# Patient Record
Sex: FEMALE | Race: BLACK OR AFRICAN AMERICAN | NOT HISPANIC OR LATINO | URBAN - METROPOLITAN AREA
[De-identification: names, ages, dates, MRNs, and addresses within clinical notes are randomized per-mention and may not be internally consistent; named-entity substitution may affect disease eponyms.]

---

## 2018-05-02 ENCOUNTER — OUTPATIENT (OUTPATIENT)
Dept: OUTPATIENT SERVICES | Facility: HOSPITAL | Age: 63
LOS: 1 days | End: 2018-05-02
Payer: COMMERCIAL

## 2018-05-02 ENCOUNTER — APPOINTMENT (OUTPATIENT)
Dept: CV DIAGNOSTICS | Facility: HOSPITAL | Age: 63
End: 2018-05-02

## 2018-05-02 DIAGNOSIS — I25.10 ATHEROSCLEROTIC HEART DISEASE OF NATIVE CORONARY ARTERY WITHOUT ANGINA PECTORIS: ICD-10-CM

## 2018-05-02 DIAGNOSIS — Z98.82 BREAST IMPLANT STATUS: Chronic | ICD-10-CM

## 2018-05-02 DIAGNOSIS — D25.9 LEIOMYOMA OF UTERUS, UNSPECIFIED: Chronic | ICD-10-CM

## 2018-05-02 PROCEDURE — 78452 HT MUSCLE IMAGE SPECT MULT: CPT | Mod: 26

## 2018-05-02 PROCEDURE — 93016 CV STRESS TEST SUPVJ ONLY: CPT

## 2018-05-02 PROCEDURE — 78452 HT MUSCLE IMAGE SPECT MULT: CPT

## 2018-05-02 PROCEDURE — A9500: CPT

## 2018-05-02 PROCEDURE — 93017 CV STRESS TEST TRACING ONLY: CPT

## 2018-05-02 PROCEDURE — 93018 CV STRESS TEST I&R ONLY: CPT

## 2018-07-06 ENCOUNTER — OUTPATIENT (OUTPATIENT)
Dept: OUTPATIENT SERVICES | Facility: HOSPITAL | Age: 63
LOS: 1 days | End: 2018-07-06
Payer: COMMERCIAL

## 2018-07-06 ENCOUNTER — TRANSCRIPTION ENCOUNTER (OUTPATIENT)
Age: 63
End: 2018-07-06

## 2018-07-06 VITALS
RESPIRATION RATE: 16 BRPM | SYSTOLIC BLOOD PRESSURE: 132 MMHG | TEMPERATURE: 99 F | WEIGHT: 225.09 LBS | HEIGHT: 65 IN | HEART RATE: 98 BPM | OXYGEN SATURATION: 98 % | DIASTOLIC BLOOD PRESSURE: 63 MMHG

## 2018-07-06 DIAGNOSIS — D25.9 LEIOMYOMA OF UTERUS, UNSPECIFIED: Chronic | ICD-10-CM

## 2018-07-06 DIAGNOSIS — Z98.82 BREAST IMPLANT STATUS: Chronic | ICD-10-CM

## 2018-07-06 DIAGNOSIS — R94.39 ABNORMAL RESULT OF OTHER CARDIOVASCULAR FUNCTION STUDY: ICD-10-CM

## 2018-07-06 LAB
ANION GAP SERPL CALC-SCNC: 15 MMOL/L — SIGNIFICANT CHANGE UP (ref 5–17)
BUN SERPL-MCNC: 16 MG/DL — SIGNIFICANT CHANGE UP (ref 7–23)
CALCIUM SERPL-MCNC: 9.7 MG/DL — SIGNIFICANT CHANGE UP (ref 8.4–10.5)
CHLORIDE SERPL-SCNC: 100 MMOL/L — SIGNIFICANT CHANGE UP (ref 96–108)
CO2 SERPL-SCNC: 27 MMOL/L — SIGNIFICANT CHANGE UP (ref 22–31)
CREAT SERPL-MCNC: 1.04 MG/DL — SIGNIFICANT CHANGE UP (ref 0.5–1.3)
GLUCOSE BLDC GLUCOMTR-MCNC: 234 MG/DL — HIGH (ref 70–99)
GLUCOSE SERPL-MCNC: 285 MG/DL — HIGH (ref 70–99)
HCT VFR BLD CALC: 40.3 % — SIGNIFICANT CHANGE UP (ref 34.5–45)
HGB BLD-MCNC: 14 G/DL — SIGNIFICANT CHANGE UP (ref 11.5–15.5)
MCHC RBC-ENTMCNC: 32.1 PG — SIGNIFICANT CHANGE UP (ref 27–34)
MCHC RBC-ENTMCNC: 34.7 GM/DL — SIGNIFICANT CHANGE UP (ref 32–36)
MCV RBC AUTO: 92.6 FL — SIGNIFICANT CHANGE UP (ref 80–100)
PLATELET # BLD AUTO: 244 K/UL — SIGNIFICANT CHANGE UP (ref 150–400)
POTASSIUM SERPL-MCNC: 3.8 MMOL/L — SIGNIFICANT CHANGE UP (ref 3.5–5.3)
POTASSIUM SERPL-SCNC: 3.8 MMOL/L — SIGNIFICANT CHANGE UP (ref 3.5–5.3)
RBC # BLD: 4.35 M/UL — SIGNIFICANT CHANGE UP (ref 3.8–5.2)
RBC # FLD: 12.3 % — SIGNIFICANT CHANGE UP (ref 10.3–14.5)
SODIUM SERPL-SCNC: 142 MMOL/L — SIGNIFICANT CHANGE UP (ref 135–145)
WBC # BLD: 5.2 K/UL — SIGNIFICANT CHANGE UP (ref 3.8–10.5)
WBC # FLD AUTO: 5.2 K/UL — SIGNIFICANT CHANGE UP (ref 3.8–10.5)

## 2018-07-06 PROCEDURE — 82962 GLUCOSE BLOOD TEST: CPT

## 2018-07-06 PROCEDURE — 93010 ELECTROCARDIOGRAM REPORT: CPT

## 2018-07-06 PROCEDURE — 99152 MOD SED SAME PHYS/QHP 5/>YRS: CPT

## 2018-07-06 PROCEDURE — 93458 L HRT ARTERY/VENTRICLE ANGIO: CPT | Mod: 26

## 2018-07-06 PROCEDURE — 93458 L HRT ARTERY/VENTRICLE ANGIO: CPT

## 2018-07-06 PROCEDURE — C1894: CPT

## 2018-07-06 PROCEDURE — C1887: CPT

## 2018-07-06 PROCEDURE — 80048 BASIC METABOLIC PNL TOTAL CA: CPT

## 2018-07-06 PROCEDURE — 93005 ELECTROCARDIOGRAM TRACING: CPT

## 2018-07-06 PROCEDURE — C1769: CPT

## 2018-07-06 PROCEDURE — 85027 COMPLETE CBC AUTOMATED: CPT

## 2018-07-06 NOTE — H&P CARDIOLOGY - PMH
CKD (chronic kidney disease) stage 3, GFR 30-59 ml/min    Hyperlipemia    Hypertension    Open-angle glaucoma of left eye, indeterminate stage, unspecified open-angle glaucoma type    ALLEY (obstructive sleep apnea)    Type 2 diabetes mellitus with complication, with long-term current use of insulin

## 2018-07-06 NOTE — H&P CARDIOLOGY - HISTORY OF PRESENT ILLNESS
64 y/o  female with PMHx of T2DM - uncontrolled, on insulin, Hem A1C 11.9, CKD III, HTN, HLD and ALLEY on CPAP who presented to Cardiology - Dr. Garza with c/o diaphoresis, palpitations, lightheadedness and fatigue x >1 year.  Patient states the lightheadedness and fatigue has exacerbated over the past 3 months.  Patient had Nuclear Stress Test in 5/2018 which was abnormal - report below.  Patient is now for [facility] for which she presents today.    IMPRESSIONS:Abnormal Study  * Exercise capacity: 7 METS, Fair for age and gender.  * Chest Pain: No chest pain with exercise.  * Symptom: Shortness of breath.  * HR Response: Appropriate.  * BP Response: Appropriate.  * Heart Rhythm: Sinus Rhythm - 90 BPM.  * Baseline ECG: Nonspecific ST-T wave abnormality.  * ECG Changes: ST Depression: Approximately 0.5 mm  horizontal in leads II, III, aVF, and V3-V6 started at  03:00 min of exercise at HR of 126 and persisted at least  9:30 min into recovery.  These changes did not meet  ischemic criteria.  * Arrhythmia: None.  * Review of raw data shows: The study is of good technical  quality., Breast attenuation artifact.  * The left ventricle was normal in size. There are mild  defects in the mid to distal anterior wall that are fixed  and correct with prone imaging suggestive of attenuation  artifact.  No focal perfusion defects suggestive of  infarct or ischemia.  * However, the left ventricular cavity appears to dilate  with stress with a quantified TID value of 1.18 which is  abnormal for this protocol.   In the absence of focal  perfusion defects, the clinical significance of this  finding is uncertain.  * Post-stress gated wall motion analysis was performed  (LVEF = 61 %;LVEDV = 75 ml.), revealing normal overall LV  function.

## 2018-08-09 PROBLEM — H40.10X4 UNSPECIFIED OPEN-ANGLE GLAUCOMA, INDETERMINATE STAGE: Chronic | Status: ACTIVE | Noted: 2018-07-06

## 2018-08-09 PROBLEM — E11.8 TYPE 2 DIABETES MELLITUS WITH UNSPECIFIED COMPLICATIONS: Chronic | Status: ACTIVE | Noted: 2018-07-06

## 2018-08-09 PROBLEM — N18.3 CHRONIC KIDNEY DISEASE, STAGE 3 (MODERATE): Chronic | Status: ACTIVE | Noted: 2018-07-06

## 2018-10-04 ENCOUNTER — APPOINTMENT (OUTPATIENT)
Dept: GASTROENTEROLOGY | Facility: CLINIC | Age: 63
End: 2018-10-04
Payer: COMMERCIAL

## 2018-10-04 VITALS
HEIGHT: 65 IN | WEIGHT: 234 LBS | RESPIRATION RATE: 18 BRPM | HEART RATE: 97 BPM | OXYGEN SATURATION: 95 % | SYSTOLIC BLOOD PRESSURE: 126 MMHG | TEMPERATURE: 98.7 F | BODY MASS INDEX: 38.99 KG/M2 | DIASTOLIC BLOOD PRESSURE: 59 MMHG

## 2018-10-04 DIAGNOSIS — R10.9 UNSPECIFIED ABDOMINAL PAIN: ICD-10-CM

## 2018-10-04 PROCEDURE — 99204 OFFICE O/P NEW MOD 45 MIN: CPT

## 2018-10-04 RX ORDER — FLASH GLUCOSE SENSOR
KIT MISCELLANEOUS
Qty: 3 | Refills: 0 | Status: ACTIVE | COMMUNITY
Start: 2018-08-22

## 2018-10-04 RX ORDER — INSULIN DETEMIR 100 [IU]/ML
100 INJECTION, SOLUTION SUBCUTANEOUS
Qty: 15 | Refills: 0 | Status: ACTIVE | COMMUNITY
Start: 2017-10-25

## 2018-10-04 RX ORDER — FLASH GLUCOSE SENSOR
KIT MISCELLANEOUS
Qty: 1 | Refills: 0 | Status: ACTIVE | COMMUNITY
Start: 2018-08-15

## 2018-10-04 RX ORDER — PEN NEEDLE, DIABETIC 29 G X1/2"
32G X 4 MM NEEDLE, DISPOSABLE MISCELLANEOUS
Qty: 100 | Refills: 0 | Status: ACTIVE | COMMUNITY
Start: 2017-10-25

## 2018-10-04 RX ORDER — POLYETHYLENE GLYCOL 3350 17 G/17G
17 POWDER, FOR SOLUTION ORAL
Qty: 238 | Refills: 0 | Status: ACTIVE | COMMUNITY
Start: 2018-10-04 | End: 1900-01-01

## 2018-10-04 RX ORDER — INSULIN ASPART 100 [IU]/ML
100 INJECTION, SOLUTION INTRAVENOUS; SUBCUTANEOUS
Qty: 15 | Refills: 0 | Status: ACTIVE | COMMUNITY
Start: 2018-02-09

## 2018-10-04 RX ORDER — LANCETS 28 GAUGE
EACH MISCELLANEOUS
Qty: 200 | Refills: 0 | Status: ACTIVE | COMMUNITY
Start: 2018-08-15

## 2018-10-04 RX ORDER — BLOOD SUGAR DIAGNOSTIC
STRIP MISCELLANEOUS
Qty: 100 | Refills: 0 | Status: ACTIVE | COMMUNITY
Start: 2018-08-15

## 2018-10-04 RX ORDER — ROSUVASTATIN CALCIUM 10 MG/1
10 TABLET, FILM COATED ORAL
Qty: 30 | Refills: 0 | Status: ACTIVE | COMMUNITY
Start: 2018-05-01

## 2018-10-05 LAB
ALBUMIN SERPL ELPH-MCNC: 4.7 G/DL
ALP BLD-CCNC: 88 U/L
ALT SERPL-CCNC: 20 U/L
ANION GAP SERPL CALC-SCNC: 16 MMOL/L
AST SERPL-CCNC: 16 U/L
BASOPHILS # BLD AUTO: 0.03 K/UL
BASOPHILS NFR BLD AUTO: 0.6 %
BILIRUB SERPL-MCNC: 0.5 MG/DL
BUN SERPL-MCNC: 19 MG/DL
CALCIUM SERPL-MCNC: 10.3 MG/DL
CHLORIDE SERPL-SCNC: 103 MMOL/L
CO2 SERPL-SCNC: 30 MMOL/L
CREAT SERPL-MCNC: 1.21 MG/DL
EOSINOPHIL # BLD AUTO: 0.18 K/UL
EOSINOPHIL NFR BLD AUTO: 3.5 %
GLUCOSE SERPL-MCNC: 157 MG/DL
HCT VFR BLD CALC: 40.3 %
HGB BLD-MCNC: 12.7 G/DL
IMM GRANULOCYTES NFR BLD AUTO: 0.2 %
LYMPHOCYTES # BLD AUTO: 2.71 K/UL
LYMPHOCYTES NFR BLD AUTO: 52.6 %
MAN DIFF?: NORMAL
MCHC RBC-ENTMCNC: 29.1 PG
MCHC RBC-ENTMCNC: 31.5 GM/DL
MCV RBC AUTO: 92.2 FL
MONOCYTES # BLD AUTO: 0.61 K/UL
MONOCYTES NFR BLD AUTO: 11.8 %
NEUTROPHILS # BLD AUTO: 1.61 K/UL
NEUTROPHILS NFR BLD AUTO: 31.3 %
PLATELET # BLD AUTO: 271 K/UL
POTASSIUM SERPL-SCNC: 3.6 MMOL/L
PROT SERPL-MCNC: 7.5 G/DL
RBC # BLD: 4.37 M/UL
RBC # FLD: 15 %
SODIUM SERPL-SCNC: 149 MMOL/L
WBC # FLD AUTO: 5.15 K/UL

## 2018-12-31 ENCOUNTER — OUTPATIENT (OUTPATIENT)
Dept: OUTPATIENT SERVICES | Facility: HOSPITAL | Age: 63
LOS: 1 days | End: 2018-12-31
Payer: COMMERCIAL

## 2018-12-31 DIAGNOSIS — R10.9 UNSPECIFIED ABDOMINAL PAIN: ICD-10-CM

## 2018-12-31 DIAGNOSIS — D25.9 LEIOMYOMA OF UTERUS, UNSPECIFIED: Chronic | ICD-10-CM

## 2018-12-31 DIAGNOSIS — Z98.82 BREAST IMPLANT STATUS: Chronic | ICD-10-CM

## 2018-12-31 DIAGNOSIS — R19.4 CHANGE IN BOWEL HABIT: ICD-10-CM

## 2018-12-31 PROCEDURE — 45378 DIAGNOSTIC COLONOSCOPY: CPT

## 2018-12-31 PROCEDURE — 82962 GLUCOSE BLOOD TEST: CPT

## 2019-01-29 ENCOUNTER — OTHER (OUTPATIENT)
Age: 64
End: 2019-01-29

## 2019-03-07 ENCOUNTER — TRANSCRIPTION ENCOUNTER (OUTPATIENT)
Age: 64
End: 2019-03-07

## 2019-03-29 ENCOUNTER — OTHER (OUTPATIENT)
Age: 64
End: 2019-03-29

## 2019-04-01 ENCOUNTER — OTHER (OUTPATIENT)
Age: 64
End: 2019-04-01

## 2019-04-17 LAB
ALBUMIN SERPL ELPH-MCNC: 4.5 G/DL
ALP BLD-CCNC: 84 U/L
ALT SERPL-CCNC: 15 U/L
ANION GAP SERPL CALC-SCNC: 14 MMOL/L
AST SERPL-CCNC: 16 U/L
BASOPHILS # BLD AUTO: 0.06 K/UL
BASOPHILS NFR BLD AUTO: 0.9 %
BILIRUB SERPL-MCNC: 0.5 MG/DL
BUN SERPL-MCNC: 21 MG/DL
CALCIUM SERPL-MCNC: 10 MG/DL
CHLORIDE SERPL-SCNC: 102 MMOL/L
CO2 SERPL-SCNC: 27 MMOL/L
CREAT SERPL-MCNC: 1.09 MG/DL
EOSINOPHIL # BLD AUTO: 0.13 K/UL
EOSINOPHIL NFR BLD AUTO: 2 %
GLUCOSE SERPL-MCNC: 138 MG/DL
HCT VFR BLD CALC: 43.3 %
HGB BLD-MCNC: 13.8 G/DL
IMM GRANULOCYTES NFR BLD AUTO: 0.2 %
LYMPHOCYTES # BLD AUTO: 3.14 K/UL
LYMPHOCYTES NFR BLD AUTO: 49.3 %
MAN DIFF?: NORMAL
MCHC RBC-ENTMCNC: 29.4 PG
MCHC RBC-ENTMCNC: 31.9 GM/DL
MCV RBC AUTO: 92.3 FL
MONOCYTES # BLD AUTO: 0.68 K/UL
MONOCYTES NFR BLD AUTO: 10.7 %
NEUTROPHILS # BLD AUTO: 2.35 K/UL
NEUTROPHILS NFR BLD AUTO: 36.9 %
PLATELET # BLD AUTO: 319 K/UL
POTASSIUM SERPL-SCNC: 3.8 MMOL/L
PROT SERPL-MCNC: 7.2 G/DL
RBC # BLD: 4.69 M/UL
RBC # FLD: 14.2 %
SODIUM SERPL-SCNC: 143 MMOL/L
WBC # FLD AUTO: 6.37 K/UL

## 2020-02-06 ENCOUNTER — APPOINTMENT (OUTPATIENT)
Dept: UROLOGY | Facility: CLINIC | Age: 65
End: 2020-02-06

## 2020-03-12 ENCOUNTER — APPOINTMENT (OUTPATIENT)
Dept: UROLOGY | Facility: CLINIC | Age: 65
End: 2020-03-12
Payer: COMMERCIAL

## 2020-03-12 VITALS
DIASTOLIC BLOOD PRESSURE: 73 MMHG | BODY MASS INDEX: 38.99 KG/M2 | SYSTOLIC BLOOD PRESSURE: 162 MMHG | HEART RATE: 109 BPM | HEIGHT: 65 IN | WEIGHT: 234 LBS | TEMPERATURE: 98.5 F

## 2020-03-12 DIAGNOSIS — E78.5 HYPERLIPIDEMIA, UNSPECIFIED: ICD-10-CM

## 2020-03-12 DIAGNOSIS — I10 ESSENTIAL (PRIMARY) HYPERTENSION: ICD-10-CM

## 2020-03-12 PROCEDURE — 99204 OFFICE O/P NEW MOD 45 MIN: CPT

## 2020-03-12 NOTE — REVIEW OF SYSTEMS
[Feeling Tired] : feeling tired [Recent Weight Gain (___ Lbs)] : recent [unfilled] ~Ulb weight gain [Eyesight Problems] : eyesight problems [Palpitations] : palpitations [Cough] : cough [Abdominal Pain] : abdominal pain [Heartburn] : heartburn [Urine Infection (bladder/kidney)] : bladder/kidney infection [Wake up at night to urinate  How many times?  ___] : wakes up to urinate [unfilled] times during the night [Leakage of urine with urgency] : leakage of urine with urgency [Leakage of urine with straining, coughing, laughing] : leakage of urine with straining, coughing, laughing [Joint Pain] : joint pain [Joint Swelling] : joint swelling [Difficulty Walking] : difficulty walking [Negative] : Heme/Lymph

## 2020-03-17 LAB
APPEARANCE: CLEAR
BACTERIA UR CULT: NORMAL
BACTERIA: NEGATIVE
BILIRUBIN URINE: NEGATIVE
BLOOD URINE: NEGATIVE
COLOR: YELLOW
GLUCOSE QUALITATIVE U: ABNORMAL
HYALINE CASTS: 2 /LPF
KETONES URINE: NEGATIVE
LEUKOCYTE ESTERASE URINE: ABNORMAL
MICROSCOPIC-UA: NORMAL
NITRITE URINE: NEGATIVE
PH URINE: 6
PROTEIN URINE: ABNORMAL
RED BLOOD CELLS URINE: 3 /HPF
SPECIFIC GRAVITY URINE: 1.02
SQUAMOUS EPITHELIAL CELLS: 4 /HPF
UROBILINOGEN URINE: NORMAL
WHITE BLOOD CELLS URINE: 10 /HPF

## 2020-03-19 PROBLEM — I10 HYPERTENSION: Status: ACTIVE | Noted: 2020-03-19

## 2020-03-19 PROBLEM — E78.5 HYPERLIPIDEMIA: Status: ACTIVE | Noted: 2020-03-19

## 2020-06-02 ENCOUNTER — INPATIENT (INPATIENT)
Facility: HOSPITAL | Age: 65
LOS: 1 days | Discharge: HOME | End: 2020-06-04
Attending: INTERNAL MEDICINE | Admitting: INTERNAL MEDICINE
Payer: COMMERCIAL

## 2020-06-02 VITALS
HEART RATE: 119 BPM | SYSTOLIC BLOOD PRESSURE: 155 MMHG | DIASTOLIC BLOOD PRESSURE: 71 MMHG | RESPIRATION RATE: 20 BRPM | TEMPERATURE: 99 F | OXYGEN SATURATION: 100 %

## 2020-06-02 DIAGNOSIS — Z98.82 BREAST IMPLANT STATUS: Chronic | ICD-10-CM

## 2020-06-02 DIAGNOSIS — D25.9 LEIOMYOMA OF UTERUS, UNSPECIFIED: Chronic | ICD-10-CM

## 2020-06-02 DIAGNOSIS — R06.00 DYSPNEA, UNSPECIFIED: ICD-10-CM

## 2020-06-02 LAB
ALBUMIN SERPL ELPH-MCNC: 4.4 G/DL — SIGNIFICANT CHANGE UP (ref 3.3–5)
ALP SERPL-CCNC: 82 U/L — SIGNIFICANT CHANGE UP (ref 40–120)
ALT FLD-CCNC: 13 U/L — SIGNIFICANT CHANGE UP (ref 4–33)
ANION GAP SERPL CALC-SCNC: 16 MMO/L — HIGH (ref 7–14)
APTT BLD: 28 SEC — SIGNIFICANT CHANGE UP (ref 27.5–36.3)
AST SERPL-CCNC: 22 U/L — SIGNIFICANT CHANGE UP (ref 4–32)
BASE EXCESS BLDV CALC-SCNC: 2.5 MMOL/L — SIGNIFICANT CHANGE UP
BASOPHILS # BLD AUTO: 0.05 K/UL — SIGNIFICANT CHANGE UP (ref 0–0.2)
BASOPHILS NFR BLD AUTO: 0.6 % — SIGNIFICANT CHANGE UP (ref 0–2)
BILIRUB SERPL-MCNC: 0.4 MG/DL — SIGNIFICANT CHANGE UP (ref 0.2–1.2)
BLOOD GAS VENOUS - CREATININE: 0.97 MG/DL — SIGNIFICANT CHANGE UP (ref 0.5–1.3)
BUN SERPL-MCNC: 22 MG/DL — SIGNIFICANT CHANGE UP (ref 7–23)
CALCIUM SERPL-MCNC: 10.1 MG/DL — SIGNIFICANT CHANGE UP (ref 8.4–10.5)
CHLORIDE BLDV-SCNC: 109 MMOL/L — HIGH (ref 96–108)
CHLORIDE SERPL-SCNC: 101 MMOL/L — SIGNIFICANT CHANGE UP (ref 98–107)
CO2 SERPL-SCNC: 26 MMOL/L — SIGNIFICANT CHANGE UP (ref 22–31)
CREAT SERPL-MCNC: 1.13 MG/DL — SIGNIFICANT CHANGE UP (ref 0.5–1.3)
EOSINOPHIL # BLD AUTO: 0.14 K/UL — SIGNIFICANT CHANGE UP (ref 0–0.5)
EOSINOPHIL NFR BLD AUTO: 1.7 % — SIGNIFICANT CHANGE UP (ref 0–6)
GAS PNL BLDV: 142 MMOL/L — SIGNIFICANT CHANGE UP (ref 136–146)
GLUCOSE BLDV-MCNC: 134 MG/DL — HIGH (ref 70–99)
GLUCOSE SERPL-MCNC: 100 MG/DL — HIGH (ref 70–99)
HCO3 BLDV-SCNC: 25 MMOL/L — SIGNIFICANT CHANGE UP (ref 20–27)
HCT VFR BLD CALC: 42.3 % — SIGNIFICANT CHANGE UP (ref 34.5–45)
HCT VFR BLDV CALC: 38.4 % — SIGNIFICANT CHANGE UP (ref 34.5–45)
HGB BLD-MCNC: 13.6 G/DL — SIGNIFICANT CHANGE UP (ref 11.5–15.5)
HGB BLDV-MCNC: 12.5 G/DL — SIGNIFICANT CHANGE UP (ref 11.5–15.5)
IMM GRANULOCYTES NFR BLD AUTO: 0.2 % — SIGNIFICANT CHANGE UP (ref 0–1.5)
INR BLD: 0.97 — SIGNIFICANT CHANGE UP (ref 0.88–1.17)
LACTATE BLDV-MCNC: 1 MMOL/L — SIGNIFICANT CHANGE UP (ref 0.5–2)
LYMPHOCYTES # BLD AUTO: 3.7 K/UL — HIGH (ref 1–3.3)
LYMPHOCYTES # BLD AUTO: 44.2 % — HIGH (ref 13–44)
MCHC RBC-ENTMCNC: 29.2 PG — SIGNIFICANT CHANGE UP (ref 27–34)
MCHC RBC-ENTMCNC: 32.2 % — SIGNIFICANT CHANGE UP (ref 32–36)
MCV RBC AUTO: 91 FL — SIGNIFICANT CHANGE UP (ref 80–100)
MONOCYTES # BLD AUTO: 1.02 K/UL — HIGH (ref 0–0.9)
MONOCYTES NFR BLD AUTO: 12.2 % — SIGNIFICANT CHANGE UP (ref 2–14)
NEUTROPHILS # BLD AUTO: 3.44 K/UL — SIGNIFICANT CHANGE UP (ref 1.8–7.4)
NEUTROPHILS NFR BLD AUTO: 41.1 % — LOW (ref 43–77)
NRBC # FLD: 0 K/UL — SIGNIFICANT CHANGE UP (ref 0–0)
NT-PROBNP SERPL-SCNC: 43.21 PG/ML — SIGNIFICANT CHANGE UP
PCO2 BLDV: 51 MMHG — SIGNIFICANT CHANGE UP (ref 41–51)
PH BLDV: 7.35 PH — SIGNIFICANT CHANGE UP (ref 7.32–7.43)
PLATELET # BLD AUTO: 354 K/UL — SIGNIFICANT CHANGE UP (ref 150–400)
PMV BLD: 11 FL — SIGNIFICANT CHANGE UP (ref 7–13)
PO2 BLDV: 32 MMHG — LOW (ref 35–40)
POTASSIUM BLDV-SCNC: 3.4 MMOL/L — SIGNIFICANT CHANGE UP (ref 3.4–4.5)
POTASSIUM SERPL-MCNC: 3.7 MMOL/L — SIGNIFICANT CHANGE UP (ref 3.5–5.3)
POTASSIUM SERPL-SCNC: 3.7 MMOL/L — SIGNIFICANT CHANGE UP (ref 3.5–5.3)
PROT SERPL-MCNC: 7.5 G/DL — SIGNIFICANT CHANGE UP (ref 6–8.3)
PROTHROM AB SERPL-ACNC: 11 SEC — SIGNIFICANT CHANGE UP (ref 9.8–13.1)
RBC # BLD: 4.65 M/UL — SIGNIFICANT CHANGE UP (ref 3.8–5.2)
RBC # FLD: 15.3 % — HIGH (ref 10.3–14.5)
SAO2 % BLDV: 56.7 % — LOW (ref 60–85)
SODIUM SERPL-SCNC: 143 MMOL/L — SIGNIFICANT CHANGE UP (ref 135–145)
TROPONIN T, HIGH SENSITIVITY: 22 NG/L — SIGNIFICANT CHANGE UP (ref ?–14)
TROPONIN T, HIGH SENSITIVITY: 23 NG/L — SIGNIFICANT CHANGE UP (ref ?–14)
WBC # BLD: 8.37 K/UL — SIGNIFICANT CHANGE UP (ref 3.8–10.5)
WBC # FLD AUTO: 8.37 K/UL — SIGNIFICANT CHANGE UP (ref 3.8–10.5)

## 2020-06-02 PROCEDURE — 99285 EMERGENCY DEPT VISIT HI MDM: CPT | Mod: 25

## 2020-06-02 PROCEDURE — 71275 CT ANGIOGRAPHY CHEST: CPT | Mod: 26

## 2020-06-02 PROCEDURE — 93970 EXTREMITY STUDY: CPT | Mod: 26

## 2020-06-02 PROCEDURE — 93010 ELECTROCARDIOGRAM REPORT: CPT

## 2020-06-02 RX ORDER — HYDROCHLOROTHIAZIDE 25 MG
12.5 TABLET ORAL DAILY
Refills: 0 | Status: DISCONTINUED | OUTPATIENT
Start: 2020-06-02 | End: 2020-06-04

## 2020-06-02 RX ORDER — ATORVASTATIN CALCIUM 80 MG/1
40 TABLET, FILM COATED ORAL AT BEDTIME
Refills: 0 | Status: DISCONTINUED | OUTPATIENT
Start: 2020-06-02 | End: 2020-06-04

## 2020-06-02 RX ORDER — INSULIN DETEMIR 100/ML (3)
50 INSULIN PEN (ML) SUBCUTANEOUS
Qty: 0 | Refills: 0 | DISCHARGE

## 2020-06-02 RX ORDER — TIZANIDINE 4 MG/1
1 TABLET ORAL
Qty: 0 | Refills: 0 | DISCHARGE

## 2020-06-02 RX ORDER — LISINOPRIL 2.5 MG/1
40 TABLET ORAL DAILY
Refills: 0 | Status: DISCONTINUED | OUTPATIENT
Start: 2020-06-02 | End: 2020-06-04

## 2020-06-02 RX ORDER — METOPROLOL TARTRATE 50 MG
25 TABLET ORAL DAILY
Refills: 0 | Status: DISCONTINUED | OUTPATIENT
Start: 2020-06-02 | End: 2020-06-04

## 2020-06-02 RX ORDER — LABETALOL HCL 100 MG
200 TABLET ORAL ONCE
Refills: 0 | Status: COMPLETED | OUTPATIENT
Start: 2020-06-02 | End: 2020-06-02

## 2020-06-02 RX ORDER — LABETALOL HCL 100 MG
1 TABLET ORAL
Qty: 0 | Refills: 0 | DISCHARGE

## 2020-06-02 RX ORDER — OXYBUTYNIN CHLORIDE 5 MG
10 TABLET ORAL DAILY
Refills: 0 | Status: DISCONTINUED | OUTPATIENT
Start: 2020-06-02 | End: 2020-06-03

## 2020-06-02 RX ORDER — INSULIN ASPART 100 [IU]/ML
15 INJECTION, SOLUTION SUBCUTANEOUS
Qty: 0 | Refills: 0 | DISCHARGE

## 2020-06-02 RX ORDER — LATANOPROST 0.05 MG/ML
1 SOLUTION/ DROPS OPHTHALMIC; TOPICAL
Qty: 0 | Refills: 0 | DISCHARGE

## 2020-06-02 RX ORDER — AMLODIPINE BESYLATE 2.5 MG/1
10 TABLET ORAL DAILY
Refills: 0 | Status: DISCONTINUED | OUTPATIENT
Start: 2020-06-02 | End: 2020-06-04

## 2020-06-02 NOTE — ED ADULT NURSE REASSESSMENT NOTE - NS ED NURSE REASSESS COMMENT FT1
report received from SAHRA Newsome, pt A&Ox4, denies chest pain and SOB. pt here for dyspnea on exertion and leg swelling. scans negative for DVT, PE. pt admitted to medicine. NSR on tele, RN facilitator at bedside for labs.

## 2020-06-02 NOTE — ED PROVIDER NOTE - NS ED ROS FT
General: denies fever, chills, weight loss/weight gain.  HENT: denies nasal congestion, sore throat, rhinorrhea, ear pain.  Eyes: denies visual changes, blurred vision, eye discharge, eye redness  Neck: denies neck pain, neck swelling  CV: +chest discomfort; denies palpitations  Resp: +CARDENAS; denies cough  Abdominal: denies nausea, vomiting, diarrhea, abdominal pain, blood in stool, dark stool  MSK: denies muscle aches, bony pain, leg pain, leg swelling  Neuro: denies headaches, numbness, tingling, dizziness, lightheadedness.  Skin: denies rashes, cuts, bruises  Hematologic: denies unexplained bruises

## 2020-06-02 NOTE — ED PROVIDER NOTE - CLINICAL SUMMARY MEDICAL DECISION MAKING FREE TEXT BOX
Pt. is a 65 y.o. F p/w CARDENAS, chest tightness, and b/l LE swelling, r>l, since the last three days.  High concern for PE given sxs and recent dx of covid.  Possibly acs.  Will get cbc, cmp, trops, bnp.  Pt. has a reported hx of CKD stage 3, but last few creatinine have been wnl, so will re-check and proceed w/ CTA if creatinine acceptable.  If not, will do V/Q scan.  Even if all results negative, will maintain low threshold for admission for cardiac w/u. Pt. is a 65 y.o. F p/w CARDENAS, chest tightness, and b/l LE swelling, r>l, since the last three days.  May be related to covid v.  PE  v. chf given sxs and recent dx of covid.  Possibly acs.  Will get cbc, cmp, trops, bnp.  Pt. has a reported hx of CKD stage 3, but last few creatinine have been wnl, so will re-check and proceed w/ CTA if creatinine acceptable.  If not, will do V/Q scan.  Even if all results negative, will maintain low threshold for admission for cardiac w/u.

## 2020-06-02 NOTE — ED PROVIDER NOTE - ATTENDING CONTRIBUTION TO CARE
agree with above hpi  on my exam  GEN - NAD; well appearing; A+O x3   HEAD - NC/AT   EYES- PERRL, EOMI  ENT: Airway patent, mmm, Oral cavity and pharynx normal. No inflammation, swelling, exudate, or lesions.  NECK: Neck supple, non-tender without lymphadenopathy, no masses.  PULMONARY - CTA b/l, symmetric breath sounds.   CARDIAC -s1s2, RRR, no M,G,R  ABDOMEN - +BS, ND, NT, soft, no guarding, no rebound, no masses   BACK - no CVA tenderness, Normal  spine   EXTREMITIES - FROM, symmetric pulses, capillary refill < 2 seconds, b/l le R>L  SKIN - no rash or bruising   NEUROLOGIC - alert, speech clear, no focal deficits  PSYCH -nl mood/affect, nl insight.  Plan for labs, cxr, duplex b/l le, likely cta pending Cr result-eval for diff including but not limited to anemia, elec disturbance, organ dysfunction, chf exacerbation, pna, dvt, pe, effusion. Monitor, reassess.

## 2020-06-02 NOTE — ED PROVIDER NOTE - OBJECTIVE STATEMENT
Pt. is a 65 y.o. F w/ PMHx of HTN, HLD p/w dyspnea on exertion since Pt. is a 65 y.o. F w/ PMHx of HTN, HLD p/w dyspnea on exertion since the last three days.  Pt. states she gets winded by taking out the garbage.  Pt. was hospitalized for covid on 4/6/2020.  Pt. went to cardiologist today morning, who stated she had an abnormal ekg and that she should go to the ED to be evaluated for a PE.  Pt. denies any CP, SOB at rest, abd pain, n/v/d/c, dysuria, hematuria, hematochezia, melena, fevers, chills.  Pt. denies hx of MI, CVA, cardiac stents, PE, DVT.

## 2020-06-02 NOTE — ED ADULT TRIAGE NOTE - CHIEF COMPLAINT QUOTE
Pt states she came from the cardiologists office. Pt states she has been having CARDENAS and was sent to a cardiologist to have an EKG. Per pt she had an abnormal EKG and was told to go to the ED to r/o a PE. PMH: DM2, HTN, Hyperlipidemia, denies f/c, positive for covid 4/6.

## 2020-06-02 NOTE — ED ADULT NURSE NOTE - OBJECTIVE STATEMENT
Pt sent to ED from cardiologist, reports EKG changes. Pt reports increasing SOB upon exertion. Pt states she notices that she is unable to take out the trash and do normal chores without becoming very short of breath. Pt states she was admitted in April for COVID, was placed on Xarelto but had to stop taking it due to bleeding once she was discharged. Pt also reports leg swelling. While both of pts legs appear swollen, pts right leg is much larger than the left. Upon exam with physician pt reports pain under right knee upon palpation. Pt ambulatory with steady gait. 20GRAC IV placed. Labs drawn as ordered. Will continue to monitor.

## 2020-06-02 NOTE — ED PROVIDER NOTE - PHYSICAL EXAMINATION
GENERAL: Patient awake alert NAD.  HEENT: NC/AT.  LUNGS: CTAB, no wheezes or crackles.   CARDIAC: sinus tachycardia, no m/r/g.    ABDOMEN: Soft, NT, ND, No rebound, guarding. No CVA tenderness.   EXT: B/L LE edema, r>l. CV 2+DP/PT bilaterally.   MSK: No spinal tenderness, no pain with movement, no deformities.  NEURO: A&Ox3. Moving all extremities.  SKIN: Warm and dry. No rash.  PSYCH: Normal affect.

## 2020-06-03 DIAGNOSIS — R60.0 LOCALIZED EDEMA: ICD-10-CM

## 2020-06-03 DIAGNOSIS — E11.22 TYPE 2 DIABETES MELLITUS WITH DIABETIC CHRONIC KIDNEY DISEASE: ICD-10-CM

## 2020-06-03 DIAGNOSIS — N18.3 CHRONIC KIDNEY DISEASE, STAGE 3 (MODERATE): ICD-10-CM

## 2020-06-03 DIAGNOSIS — R06.00 DYSPNEA, UNSPECIFIED: ICD-10-CM

## 2020-06-03 DIAGNOSIS — I10 ESSENTIAL (PRIMARY) HYPERTENSION: ICD-10-CM

## 2020-06-03 DIAGNOSIS — Z29.9 ENCOUNTER FOR PROPHYLACTIC MEASURES, UNSPECIFIED: ICD-10-CM

## 2020-06-03 DIAGNOSIS — Z02.9 ENCOUNTER FOR ADMINISTRATIVE EXAMINATIONS, UNSPECIFIED: ICD-10-CM

## 2020-06-03 DIAGNOSIS — R09.89 OTHER SPECIFIED SYMPTOMS AND SIGNS INVOLVING THE CIRCULATORY AND RESPIRATORY SYSTEMS: ICD-10-CM

## 2020-06-03 LAB
D DIMER BLD IA.RAPID-MCNC: 533 NG/ML — SIGNIFICANT CHANGE UP
GLUCOSE BLDC GLUCOMTR-MCNC: 173 MG/DL — HIGH (ref 70–99)
GLUCOSE BLDC GLUCOMTR-MCNC: 213 MG/DL — HIGH (ref 70–99)
GLUCOSE BLDC GLUCOMTR-MCNC: 217 MG/DL — HIGH (ref 70–99)
GLUCOSE BLDC GLUCOMTR-MCNC: 230 MG/DL — HIGH (ref 70–99)
GLUCOSE BLDC GLUCOMTR-MCNC: 91 MG/DL — SIGNIFICANT CHANGE UP (ref 70–99)
HBA1C BLD-MCNC: 9.5 % — HIGH (ref 4–5.6)
HCV AB S/CO SERPL IA: 0.09 S/CO — SIGNIFICANT CHANGE UP (ref 0–0.99)
HCV AB SERPL-IMP: SIGNIFICANT CHANGE UP
PROCALCITONIN SERPL-MCNC: 0.06 NG/ML — SIGNIFICANT CHANGE UP (ref 0.02–0.1)
SARS-COV-2 RNA SPEC QL NAA+PROBE: SIGNIFICANT CHANGE UP

## 2020-06-03 PROCEDURE — 99223 1ST HOSP IP/OBS HIGH 75: CPT | Mod: GC

## 2020-06-03 PROCEDURE — 93306 TTE W/DOPPLER COMPLETE: CPT | Mod: 26

## 2020-06-03 PROCEDURE — 12345: CPT | Mod: NC,GC

## 2020-06-03 RX ORDER — INSULIN LISPRO 100/ML
20 VIAL (ML) SUBCUTANEOUS
Refills: 0 | Status: DISCONTINUED | OUTPATIENT
Start: 2020-06-03 | End: 2020-06-03

## 2020-06-03 RX ORDER — INSULIN GLARGINE 100 [IU]/ML
45 INJECTION, SOLUTION SUBCUTANEOUS ONCE
Refills: 0 | Status: COMPLETED | OUTPATIENT
Start: 2020-06-03 | End: 2020-06-03

## 2020-06-03 RX ORDER — GLUCAGON INJECTION, SOLUTION 0.5 MG/.1ML
1 INJECTION, SOLUTION SUBCUTANEOUS ONCE
Refills: 0 | Status: DISCONTINUED | OUTPATIENT
Start: 2020-06-03 | End: 2020-06-04

## 2020-06-03 RX ORDER — INSULIN LISPRO 100/ML
VIAL (ML) SUBCUTANEOUS
Refills: 0 | Status: DISCONTINUED | OUTPATIENT
Start: 2020-06-03 | End: 2020-06-04

## 2020-06-03 RX ORDER — INSULIN GLARGINE 100 [IU]/ML
50 INJECTION, SOLUTION SUBCUTANEOUS
Refills: 0 | Status: DISCONTINUED | OUTPATIENT
Start: 2020-06-03 | End: 2020-06-04

## 2020-06-03 RX ORDER — INSULIN LISPRO 100/ML
VIAL (ML) SUBCUTANEOUS
Refills: 0 | Status: DISCONTINUED | OUTPATIENT
Start: 2020-06-03 | End: 2020-06-03

## 2020-06-03 RX ORDER — HEPARIN SODIUM 5000 [USP'U]/ML
5000 INJECTION INTRAVENOUS; SUBCUTANEOUS EVERY 8 HOURS
Refills: 0 | Status: DISCONTINUED | OUTPATIENT
Start: 2020-06-03 | End: 2020-06-04

## 2020-06-03 RX ORDER — DEXTROSE 50 % IN WATER 50 %
15 SYRINGE (ML) INTRAVENOUS ONCE
Refills: 0 | Status: DISCONTINUED | OUTPATIENT
Start: 2020-06-03 | End: 2020-06-04

## 2020-06-03 RX ORDER — OXYBUTYNIN CHLORIDE 5 MG
10 TABLET ORAL DAILY
Refills: 0 | Status: DISCONTINUED | OUTPATIENT
Start: 2020-06-03 | End: 2020-06-04

## 2020-06-03 RX ORDER — INSULIN LISPRO 100/ML
VIAL (ML) SUBCUTANEOUS AT BEDTIME
Refills: 0 | Status: DISCONTINUED | OUTPATIENT
Start: 2020-06-03 | End: 2020-06-04

## 2020-06-03 RX ORDER — SODIUM CHLORIDE 9 MG/ML
1000 INJECTION, SOLUTION INTRAVENOUS
Refills: 0 | Status: DISCONTINUED | OUTPATIENT
Start: 2020-06-03 | End: 2020-06-04

## 2020-06-03 RX ORDER — DEXTROSE 50 % IN WATER 50 %
25 SYRINGE (ML) INTRAVENOUS ONCE
Refills: 0 | Status: DISCONTINUED | OUTPATIENT
Start: 2020-06-03 | End: 2020-06-04

## 2020-06-03 RX ORDER — DEXTROSE 50 % IN WATER 50 %
12.5 SYRINGE (ML) INTRAVENOUS ONCE
Refills: 0 | Status: DISCONTINUED | OUTPATIENT
Start: 2020-06-03 | End: 2020-06-04

## 2020-06-03 RX ORDER — INSULIN LISPRO 100/ML
VIAL (ML) SUBCUTANEOUS AT BEDTIME
Refills: 0 | Status: DISCONTINUED | OUTPATIENT
Start: 2020-06-03 | End: 2020-06-03

## 2020-06-03 RX ORDER — ENOXAPARIN SODIUM 100 MG/ML
40 INJECTION SUBCUTANEOUS
Refills: 0 | Status: DISCONTINUED | OUTPATIENT
Start: 2020-06-03 | End: 2020-06-03

## 2020-06-03 RX ADMIN — AMLODIPINE BESYLATE 10 MILLIGRAM(S): 2.5 TABLET ORAL at 12:21

## 2020-06-03 RX ADMIN — Medication 600 MILLIGRAM(S): at 12:22

## 2020-06-03 RX ADMIN — LISINOPRIL 40 MILLIGRAM(S): 2.5 TABLET ORAL at 05:14

## 2020-06-03 RX ADMIN — INSULIN GLARGINE 45 UNIT(S): 100 INJECTION, SOLUTION SUBCUTANEOUS at 03:00

## 2020-06-03 RX ADMIN — ENOXAPARIN SODIUM 40 MILLIGRAM(S): 100 INJECTION SUBCUTANEOUS at 05:13

## 2020-06-03 RX ADMIN — ATORVASTATIN CALCIUM 40 MILLIGRAM(S): 80 TABLET, FILM COATED ORAL at 21:39

## 2020-06-03 RX ADMIN — HEPARIN SODIUM 5000 UNIT(S): 5000 INJECTION INTRAVENOUS; SUBCUTANEOUS at 21:39

## 2020-06-03 RX ADMIN — INSULIN GLARGINE 50 UNIT(S): 100 INJECTION, SOLUTION SUBCUTANEOUS at 21:40

## 2020-06-03 RX ADMIN — Medication 1: at 09:09

## 2020-06-03 RX ADMIN — Medication 10 MILLIGRAM(S): at 12:21

## 2020-06-03 RX ADMIN — Medication 12.5 MILLIGRAM(S): at 05:13

## 2020-06-03 RX ADMIN — Medication 25 MILLIGRAM(S): at 05:14

## 2020-06-03 RX ADMIN — Medication 4: at 17:46

## 2020-06-03 RX ADMIN — Medication 20 UNIT(S): at 09:09

## 2020-06-03 RX ADMIN — HEPARIN SODIUM 5000 UNIT(S): 5000 INJECTION INTRAVENOUS; SUBCUTANEOUS at 12:21

## 2020-06-03 NOTE — PROGRESS NOTE ADULT - PROBLEM SELECTOR PLAN 3
- f/u HbA1C  - continue home lantus at slightly reduced dose, 50 units BID.   - humalog 20 units premeal with sliding scale. - Hb A1c 9.5%  - continue home Lantus at slightly reduced dose, 50 units BID.   - Humalog 20 units premeal with sliding scale.  - monitor FS

## 2020-06-03 NOTE — PHYSICAL THERAPY INITIAL EVALUATION ADULT - ADDITIONAL COMMENTS
Pt reports that she lives alone in a private house with 4 steps to enter; no handrails; and a flight of stairs to negotiate to bedroom/bathroom. However pt states that she has been sleeping on the first floor since having COVID and has been using a commode. Prior to hospital admission pt was completely independent and used no assistive device with ambulation. Pt's last fall was last week and before that ~3 months ago. Both falls were due to missing the last step inside of her house when she was carrying laundry. Pt received 6 visits of outpatient PT and then was discharged.     Pt left comfortable in bed, NAD, all lines intact, all precautions maintained, with call bell in reach, and RN aware of PT evaluation.

## 2020-06-03 NOTE — PROGRESS NOTE ADULT - SUBJECTIVE AND OBJECTIVE BOX
Abhinav Kebede MD  Internal Medicine Resident  [phone]    PATIENT:  JODY WORLEY  [id number]    CHIEF COMPLAINT:  Patient is a 65y old  Female who presents with a chief complaint of Dyspnea in the setting of new RBBB (03 Jun 2020 00:06)    INTERVAL HISTORY/OVERNIGHT EVENTS:      MEDICATIONS:  MEDICATIONS  (STANDING):  amLODIPine   Tablet 10 milliGRAM(s) Oral daily  atorvastatin 40 milliGRAM(s) Oral at bedtime  dextrose 5%. 1000 milliLiter(s) (50 mL/Hr) IV Continuous <Continuous>  dextrose 50% Injectable 12.5 Gram(s) IV Push once  dextrose 50% Injectable 25 Gram(s) IV Push once  dextrose 50% Injectable 25 Gram(s) IV Push once  enoxaparin Injectable 40 milliGRAM(s) SubCutaneous two times a day  hydrochlorothiazide 12.5 milliGRAM(s) Oral daily  insulin glargine Injectable (LANTUS) 50 Unit(s) SubCutaneous two times a day  insulin lispro (HumaLOG) corrective regimen sliding scale   SubCutaneous three times a day before meals  insulin lispro (HumaLOG) corrective regimen sliding scale   SubCutaneous at bedtime  insulin lispro Injectable (HumaLOG) 20 Unit(s) SubCutaneous three times a day before meals  lisinopril 40 milliGRAM(s) Oral daily  metoprolol succinate ER 25 milliGRAM(s) Oral daily  oxybutynin 10 milliGRAM(s) Oral daily    MEDICATIONS  (PRN):  dextrose 40% Gel 15 Gram(s) Oral once PRN Blood Glucose LESS THAN 70 milliGRAM(s)/deciliter  glucagon  Injectable 1 milliGRAM(s) IntraMuscular once PRN Glucose LESS THAN 70 milligrams/deciliter      ALLERGIES:  Allergies    No Known Allergies    Intolerances        OBJECTIVE:  T(C): 36.7 (03 Jun 2020 04:38), Max: 37.2 (02 Jun 2020 16:36)  T(F): 98 (03 Jun 2020 04:38), Max: 99 (02 Jun 2020 16:36)  HR: 109 (03 Jun 2020 04:38) (99 - 119)  BP: 135/74 (03 Jun 2020 04:38) (134/71 - 155/71)  RR: 20 (03 Jun 2020 04:38) (17 - 20)  SpO2: 98% (03 Jun 2020 04:38) (93% - 100%)    POCT Blood Glucose.: 230 mg/dL (03 Jun 2020 02:54)  POCT Blood Glucose.: 230 mg/dL (03 Jun 2020 02:54)    I&O's Summary    Daily Height in cm: 165.1 (02 Jun 2020 23:58)    Daily     PHYSICAL EXAMINATION:      LABS:                          13.6   8.37  )-----------( 354      ( 02 Jun 2020 17:42 )             42.3     06-02    143  |  101  |  22  ----------------------------<  100<H>  3.7   |  26  |  1.13    Ca    10.1      02 Jun 2020 17:42    TPro  7.5  /  Alb  4.4  /  TBili  0.4  /  DBili  x   /  AST  22  /  ALT  13  /  AlkPhos  82  06-02    LIVER FUNCTIONS - ( 02 Jun 2020 17:42 )  Alb: 4.4 g/dL / Pro: 7.5 g/dL / ALK PHOS: 82 u/L / ALT: 13 u/L / AST: 22 u/L / GGT: x           PT/INR - ( 02 Jun 2020 17:42 )   PT: 11.0 SEC;   INR: 0.97     PTT - ( 02 Jun 2020 17:42 )  PTT:28.0 SEC Abhinav Kebede MD  Internal Medicine Resident  [phone]    PATIENT:  JODY WORLEY  [id number]    CHIEF COMPLAINT:  Patient is a 65y old  Female who presents with a chief complaint of Dyspnea in the setting of new RBBB (03 Jun 2020 00:06)    INTERVAL HISTORY/OVERNIGHT EVENTS:  Further history elaborated below:    Ms. Worley is a 65 year old pleasant woman with a history of DM2 insulin-requiring, stage 3 CKD, ALLEY, and HTN presenting to [facility] ED on 6/2 due to 2 weeks of dyspnea no exertion and lower extremity edema. In early April patient had a 5 day hospital stay for COVID pneumonia at [facility], she was ultimately discharged with 30 days of Xarelto Then, 2 weeks ago she started having some dyspnea with light activity. She became short of breath after standing for 10 minutes, preventing her from doing light chores around the house (e.g. standing at the stove). Prior to 2 weeks, she was able to stand or walk for as long as she wanted. She does need to prop up her head at night with pillows due to her ALLEY, but had not needed to use more pillows recently. Yesterday (6/2), she noticed swelling in her feet and ankles, which was worse than any swelling had been in the past. She does confirm needing wider shoes over the past few years. Due to this worsening swelling, she called her PCP (Patti Grajeda in [place]) who referred her to Cardiology (Estrella Barton in [place] [phone]). She received an EKG, which showed new changes, so she came to the ED.     Short ED course: Patient received EKG, LE US, and CTA. EKG showed new RBBB. LE US showed no DVTs. CTA was suboptimal, but did show bilateral ground glass opacities and did NOT show central right or left main pulmonary embolism.    Patient does have a history of angina, starting at around 30 years ago, though does not endorse any recent chest pain. She has had palpitations in the past. The last time she had them was in 2018, after which she received a cardiac catheter evaluation, showing clean coronary arteries.    MEDICATIONS:  MEDICATIONS  (STANDING):  amLODIPine   Tablet 10 milliGRAM(s) Oral daily  atorvastatin 40 milliGRAM(s) Oral at bedtime  dextrose 5%. 1000 milliLiter(s) (50 mL/Hr) IV Continuous <Continuous>  dextrose 50% Injectable 12.5 Gram(s) IV Push once  dextrose 50% Injectable 25 Gram(s) IV Push once  dextrose 50% Injectable 25 Gram(s) IV Push once  enoxaparin Injectable 40 milliGRAM(s) SubCutaneous two times a day  hydrochlorothiazide 12.5 milliGRAM(s) Oral daily  insulin glargine Injectable (LANTUS) 50 Unit(s) SubCutaneous two times a day  insulin lispro (HumaLOG) corrective regimen sliding scale   SubCutaneous three times a day before meals  insulin lispro (HumaLOG) corrective regimen sliding scale   SubCutaneous at bedtime  insulin lispro Injectable (HumaLOG) 20 Unit(s) SubCutaneous three times a day before meals  lisinopril 40 milliGRAM(s) Oral daily  metoprolol succinate ER 25 milliGRAM(s) Oral daily  oxybutynin 10 milliGRAM(s) Oral daily    MEDICATIONS  (PRN):  dextrose 40% Gel 15 Gram(s) Oral once PRN Blood Glucose LESS THAN 70 milliGRAM(s)/deciliter  glucagon  Injectable 1 milliGRAM(s) IntraMuscular once PRN Glucose LESS THAN 70 milligrams/deciliter      ALLERGIES:  Allergies    No Known Allergies    Intolerances    OBJECTIVE:  T(C): 36.7 (03 Jun 2020 04:38), Max: 37.2 (02 Jun 2020 16:36)  T(F): 98 (03 Jun 2020 04:38), Max: 99 (02 Jun 2020 16:36)  HR: 109 (03 Jun 2020 04:38) (99 - 119)  BP: 135/74 (03 Jun 2020 04:38) (134/71 - 155/71)  RR: 20 (03 Jun 2020 04:38) (17 - 20)  SpO2: 98% (03 Jun 2020 04:38) (93% - 100%)    POCT Blood Glucose.: 230 mg/dL (03 Jun 2020 02:54)  POCT Blood Glucose.: 230 mg/dL (03 Jun 2020 02:54)    I&O's Summary    Daily Height in cm: 165.1 (02 Jun 2020 23:58)    Daily     PHYSICAL EXAMINATION:      LABS:                          13.6   8.37  )-----------( 354      ( 02 Jun 2020 17:42 )             42.3     06-02    143  |  101  |  22  ----------------------------<  100<H>  3.7   |  26  |  1.13    Ca    10.1      02 Jun 2020 17:42    TPro  7.5  /  Alb  4.4  /  TBili  0.4  /  DBili  x   /  AST  22  /  ALT  13  /  AlkPhos  82  06-02    LIVER FUNCTIONS - ( 02 Jun 2020 17:42 )  Alb: 4.4 g/dL / Pro: 7.5 g/dL / ALK PHOS: 82 u/L / ALT: 13 u/L / AST: 22 u/L / GGT: x           PT/INR - ( 02 Jun 2020 17:42 )   PT: 11.0 SEC;   INR: 0.97     PTT - ( 02 Jun 2020 17:42 )  PTT:28.0 SEC Abhinav Kebede MD  Internal Medicine Resident  [phone]    PATIENT:  JODY WORLEY  [id number]    CHIEF COMPLAINT:  Patient is a 65y old  Female who presents with a chief complaint of Dyspnea in the setting of new RBBB (03 Jun 2020 00:06)    INTERVAL HISTORY/OVERNIGHT EVENTS:  Further history elaborated below:    Ms. Worley is a 65 year old pleasant woman with a history of DM2 insulin-requiring, stage 3 CKD, ALLEY, and HTN presenting to [facility] ED on 6/2 due to 2 weeks of dyspnea no exertion and lower extremity edema. In early April patient had a 5 day hospital stay for COVID pneumonia at [facility], she was ultimately discharged with 30 days of Xarelto Then, 2 weeks ago she started having some dyspnea with light activity. She became short of breath after standing for 10 minutes, preventing her from doing light chores around the house (e.g. standing at the stove). Prior to 2 weeks, she was able to stand or walk for as long as she wanted. She does need to prop up her head at night with pillows due to her ALLEY, but had not needed to use more pillows recently. Yesterday (6/2), she noticed swelling in her feet and ankles, which was worse than any swelling had been in the past. She does confirm needing wider shoes over the past few years. Due to this worsening swelling, she called her PCP (Patti Grajeda in [place]) who referred her to Cardiology (Estrella Barton in [place] [phone]). She received an EKG, which showed new changes, so she came to the ED.     Short ED course: Patient received EKG, LE US, and CTA. EKG showed new RBBB. LE US showed no DVTs. CTA was suboptimal, but did show bilateral ground glass opacities and did NOT show central right or left main pulmonary embolism.    Patient does have a history of angina, starting at around 30 years ago, though does not endorse any recent chest pain. She has had palpitations in the past. The last time she had them was in 2018, after which she received a cardiac catheter evaluation, showing clean coronary arteries.    MEDICATIONS:  MEDICATIONS  (STANDING):  amLODIPine   Tablet 10 milliGRAM(s) Oral daily  atorvastatin 40 milliGRAM(s) Oral at bedtime  dextrose 5%. 1000 milliLiter(s) (50 mL/Hr) IV Continuous <Continuous>  dextrose 50% Injectable 12.5 Gram(s) IV Push once  dextrose 50% Injectable 25 Gram(s) IV Push once  dextrose 50% Injectable 25 Gram(s) IV Push once  enoxaparin Injectable 40 milliGRAM(s) SubCutaneous two times a day  hydrochlorothiazide 12.5 milliGRAM(s) Oral daily  insulin glargine Injectable (LANTUS) 50 Unit(s) SubCutaneous two times a day  insulin lispro (HumaLOG) corrective regimen sliding scale   SubCutaneous three times a day before meals  insulin lispro (HumaLOG) corrective regimen sliding scale   SubCutaneous at bedtime  insulin lispro Injectable (HumaLOG) 20 Unit(s) SubCutaneous three times a day before meals  lisinopril 40 milliGRAM(s) Oral daily  metoprolol succinate ER 25 milliGRAM(s) Oral daily  oxybutynin 10 milliGRAM(s) Oral daily    MEDICATIONS  (PRN):  dextrose 40% Gel 15 Gram(s) Oral once PRN Blood Glucose LESS THAN 70 milliGRAM(s)/deciliter  glucagon  Injectable 1 milliGRAM(s) IntraMuscular once PRN Glucose LESS THAN 70 milligrams/deciliter      ALLERGIES:  Allergies    No Known Allergies    Intolerances    OBJECTIVE:  T(C): 36.7 (03 Jun 2020 04:38), Max: 37.2 (02 Jun 2020 16:36)  T(F): 98 (03 Jun 2020 04:38), Max: 99 (02 Jun 2020 16:36)  HR: 109 (03 Jun 2020 04:38) (99 - 119)  BP: 135/74 (03 Jun 2020 04:38) (134/71 - 155/71)  RR: 20 (03 Jun 2020 04:38) (17 - 20)  SpO2: 98% (03 Jun 2020 04:38) (93% - 100%)    POCT Blood Glucose.: 230 mg/dL (03 Jun 2020 02:54)  POCT Blood Glucose.: 230 mg/dL (03 Jun 2020 02:54)    I&O's Summary    Daily Height in cm: 165.1 (02 Jun 2020 23:58)    Daily     PHYSICAL EXAMINATION:  GENERAL: NAD, well-groomed, well-developed, obese  HEENT - NC/AT, Moist mucous membranes  NECK: Supple, No JVD  CHEST/LUNG: Clear to auscultation bilaterally; No rales, rhonchi, wheezing  HEART: Regular rate and rhythm; No murmurs, rubs, or gallops appreciated  ABDOMEN: Soft, Nontender, Nondistended  EXTREMITIES: Non pitting edema noted in lower extremities. 2+ Peripheral Pulses, No clubbing, cyanosis.  NEURO:  No Focal deficits, sensory and motor intact  SKIN: 1.5 cm papule in left axilla with central white comodone appearance. No other rashes or lesions noted.    LABS:                          13.6   8.37  )-----------( 354      ( 02 Jun 2020 17:42 )             42.3     06-02    143  |  101  |  22  ----------------------------<  100<H>  3.7   |  26  |  1.13    Ca    10.1      02 Jun 2020 17:42    TPro  7.5  /  Alb  4.4  /  TBili  0.4  /  DBili  x   /  AST  22  /  ALT  13  /  AlkPhos  82  06-02    LIVER FUNCTIONS - ( 02 Jun 2020 17:42 )  Alb: 4.4 g/dL / Pro: 7.5 g/dL / ALK PHOS: 82 u/L / ALT: 13 u/L / AST: 22 u/L / GGT: x           PT/INR - ( 02 Jun 2020 17:42 )   PT: 11.0 SEC;   INR: 0.97     PTT - ( 02 Jun 2020 17:42 )  PTT:28.0 SEC Abhinav Kebede MD  Internal Medicine Resident  [phone]    PATIENT:  JODY WORLEY  [id number]    CHIEF COMPLAINT:  Patient is a 65y old  Female who presents with a chief complaint of Dyspnea in the setting of new RBBB (03 Jun 2020 00:06)    INTERVAL HISTORY/OVERNIGHT EVENTS:  Further history elaborated below:    Ms. Worley is a 65 year old pleasant woman with a history of DM2 insulin-requiring, stage 3 CKD, ALLEY, and HTN presenting to [facility] ED on 6/2 due to 2 weeks of dyspnea no exertion and lower extremity edema. In early April patient had a 5 day hospital stay for COVID pneumonia at [facility] ER, she was ultimately discharged with 30 days of Xarelto Then, 2 weeks ago she started having some dyspnea with light activity. She became short of breath after standing for 10 minutes, preventing her from doing light chores around the house (e.g. standing at the stove). Prior to 2 weeks, she was able to stand or walk for as long as she wanted. She does need to prop up her head at night with pillows due to her ALLEY, but had not needed to use more pillows recently. Yesterday (6/2), she noticed swelling in her feet and ankles, which was worse than any swelling had been in the past. She does confirm needing wider shoes over the past few years. Due to this worsening swelling, she called her PCP (Patti Grajeda in [place]) who referred her to Cardiology (Estrella Barton in [place] [phone]). She received an EKG, which showed new changes, so she came to the ED.     Short ED course: Patient received EKG, LE US, and CTA. EKG showed new RBBB. LE US showed no DVTs. CTA was suboptimal, but did show bilateral ground glass opacities and did NOT show central right or left main pulmonary embolism.    Patient does have a history of angina, starting at around 30 years ago, though does not endorse any recent chest pain. She has had palpitations in the past. The last time she had them was in 2018, after which she received a cardiac catheter evaluation, showing clean coronary arteries.    MEDICATIONS:  MEDICATIONS  (STANDING):  amLODIPine   Tablet 10 milliGRAM(s) Oral daily  atorvastatin 40 milliGRAM(s) Oral at bedtime  dextrose 5%. 1000 milliLiter(s) (50 mL/Hr) IV Continuous <Continuous>  dextrose 50% Injectable 12.5 Gram(s) IV Push once  dextrose 50% Injectable 25 Gram(s) IV Push once  dextrose 50% Injectable 25 Gram(s) IV Push once  enoxaparin Injectable 40 milliGRAM(s) SubCutaneous two times a day  hydrochlorothiazide 12.5 milliGRAM(s) Oral daily  insulin glargine Injectable (LANTUS) 50 Unit(s) SubCutaneous two times a day  insulin lispro (HumaLOG) corrective regimen sliding scale   SubCutaneous three times a day before meals  insulin lispro (HumaLOG) corrective regimen sliding scale   SubCutaneous at bedtime  insulin lispro Injectable (HumaLOG) 20 Unit(s) SubCutaneous three times a day before meals  lisinopril 40 milliGRAM(s) Oral daily  metoprolol succinate ER 25 milliGRAM(s) Oral daily  oxybutynin 10 milliGRAM(s) Oral daily    MEDICATIONS  (PRN):  dextrose 40% Gel 15 Gram(s) Oral once PRN Blood Glucose LESS THAN 70 milliGRAM(s)/deciliter  glucagon  Injectable 1 milliGRAM(s) IntraMuscular once PRN Glucose LESS THAN 70 milligrams/deciliter      ALLERGIES:  Allergies    No Known Allergies    Intolerances    OBJECTIVE:  T(C): 36.7 (03 Jun 2020 04:38), Max: 37.2 (02 Jun 2020 16:36)  T(F): 98 (03 Jun 2020 04:38), Max: 99 (02 Jun 2020 16:36)  HR: 109 (03 Jun 2020 04:38) (99 - 119)  BP: 135/74 (03 Jun 2020 04:38) (134/71 - 155/71)  RR: 20 (03 Jun 2020 04:38) (17 - 20)  SpO2: 98% (03 Jun 2020 04:38) (93% - 100%)    POCT Blood Glucose.: 230 mg/dL (03 Jun 2020 02:54)  POCT Blood Glucose.: 230 mg/dL (03 Jun 2020 02:54)    I&O's Summary    Daily Height in cm: 165.1 (02 Jun 2020 23:58)    Daily     PHYSICAL EXAMINATION:  GENERAL: NAD, well-groomed, well-developed, obese  HEENT - NC/AT, Moist mucous membranes  NECK: Supple, No JVD  CHEST/LUNG: Clear to auscultation bilaterally; No rales, rhonchi, wheezing  HEART: Regular rate and rhythm; No murmurs, rubs, or gallops appreciated  ABDOMEN: Soft, Nontender, Nondistended  EXTREMITIES: Non pitting edema noted in lower extremities. 2+ Peripheral Pulses, No clubbing, cyanosis.  NEURO:  No Focal deficits, sensory and motor intact  SKIN: 1.5 cm papule in left axilla with central white comodone appearance. No other rashes or lesions noted.    LABS:                          13.6   8.37  )-----------( 354      ( 02 Jun 2020 17:42 )             42.3     06-02    143  |  101  |  22  ----------------------------<  100<H>  3.7   |  26  |  1.13    Ca    10.1      02 Jun 2020 17:42    TPro  7.5  /  Alb  4.4  /  TBili  0.4  /  DBili  x   /  AST  22  /  ALT  13  /  AlkPhos  82  06-02    LIVER FUNCTIONS - ( 02 Jun 2020 17:42 )  Alb: 4.4 g/dL / Pro: 7.5 g/dL / ALK PHOS: 82 u/L / ALT: 13 u/L / AST: 22 u/L / GGT: x           PT/INR - ( 02 Jun 2020 17:42 )   PT: 11.0 SEC;   INR: 0.97     PTT - ( 02 Jun 2020 17:42 )  PTT:28.0 SEC

## 2020-06-03 NOTE — PHYSICAL THERAPY INITIAL EVALUATION ADULT - DIAGNOSIS, PT EVAL
Pt admitted for dyspnea; CT of chest (-) PE, US of bilateral LE (-)DVT; pt presents with decreased aerobic capacity/endurance.

## 2020-06-03 NOTE — PROGRESS NOTE ADULT - PROBLEM SELECTOR PLAN 6
Transitions of Care Status:  1.  Name of PCP:  2.  PCP Contacted on Admission: [ ] Y    [x ] N  night admission  3.  PCP contacted at Discharge: [ ] Y    [ ] N    [ ] N/A  4.  Post-Discharge Appointment Date and Location:  5.  Summary of Handoff given to PCP: Transitions of Care Status:  1.  Name of PCP: Odalis Grajeda  2.  PCP Contacted on Admission: [ ] Y    [x ] N  night admission  3.  PCP contacted at Discharge: [ ] Y    [ ] N    [ ] N/A  4.  Post-Discharge Appointment Date and Location:  5.  Summary of Handoff given to PCP: DVT prophylaxis: Heparin subQ

## 2020-06-03 NOTE — PROVIDER CONTACT NOTE (OTHER) - SITUATION
Pt stated she can't deal with the cpap with mask at home she uses nasal one (high flow nasal) Pt stated she can't deal with the cpap with mask at home she uses nasal one (high flow nasal) felt very claustrophobic in full mask

## 2020-06-03 NOTE — PHYSICAL THERAPY INITIAL EVALUATION ADULT - PERTINENT HX OF CURRENT PROBLEM, REHAB EVAL
The patient is a 65 year old woman with a history of DM, CKD and HTN presenting with dyspnea on exertion for about 2 weeks

## 2020-06-03 NOTE — PROGRESS NOTE ADULT - ASSESSMENT
The patient is a 65 year old woman with a history of DM, CKD and HTN presenting with dyspnea on exertion for about 2 weeks. Given recent covid infection there is concern for PE vs. HF PRIMITIVO is a 64yo woman with a history of insulin-dependent DM2, stage 3 CKD, obesity ALLEY, and HTN along with recent hospitalization from COVID pneumonia (d/c April 2020) and a remote history of ?angina (negative cath in 2018), and palpitations presenting to [facility] due to new onset dyspnea on exertion and incidentally found to have RBBB. Labs show normal BNP and troponin. EKG showed RBBB, while imaging shows bilateral ground glass opacities with no evidence of DVT or pulmonary embolism. Patient's new onset dyspnea possibly due to deconditioning following viral infection, right heart strain due to pHTN, or acute myocarditis. 65F h/o insulin-dependent DM2, stage 3 CKD, obesity ALLEY, and HTN along with recent hospitalization from COVID pneumonia (d/c April 2020) and a remote history of ?angina (negative cath in 2018), and palpitations presenting to [facility] due to new onset dyspnea on exertion and incidentally found to have RBBB. Labs show normal BNP and troponin. EKG showed RBBB, while imaging shows bilateral ground glass opacities with no evidence of DVT or pulmonary embolism. Patient's new onset dyspnea possibly due to deconditioning following viral infection, right heart strain due to pHTN, or acute myocarditis. 65F h/o insulin-dependent DM2, stage 3 CKD, obesity, ALLEY (inconsistent CPAP use), angina (negative cath 2018) and HTN along with recent hospitalization from COVID pneumonia (d/c April 2020 s/p Xarelto x 30 days) p/w new RBBB and persistent fatigue/SOB since discharge from COVID admission in early April 2020.

## 2020-06-03 NOTE — PROGRESS NOTE ADULT - ATTENDING COMMENTS
Patient seen and examined by me. Case discussed with resident and agree with the resident's findings and plan as documented in the resident's note.    1. SOB:  -check d-dimer: to rule out PE  -check 2D-echo: cardiac etiology  -continue Tele to monitor arrythmia; repeat EKG in am  -eventual consult cardiology for evaluation for possible RHC/LHC?  -f/u COVID PCR  -monitor off abx for now    2. Uncontrolled DM:  -GmZ7h=9.5%. FS well controlled. Continue with ISS for now and continue to monitor FS closely.  -continue reduced home dose lantus 50units and pre-meal humalog 20mg qAC  -diabetic education    3. HTN:  -BP well controlled  -patient with history of chronic tachycardia, will monitor of tele    4. CKD:  -stable Patient seen and examined by me. Case discussed with resident and agree with the resident's findings and plan as documented in the resident's note.    1. SOB:  -check d-dimer: to rule out PE  -check 2D-echo: cardiac etiology  -continue Tele to monitor arrythmia; repeat EKG in am  -eventual consult cardiology for evaluation for possible RHC/LHC?  -f/u COVID PCR  -monitor off abx for now    2. Uncontrolled DM:  -UnX9w=0.5%. FS well controlled. Continue with ISS for now and continue to monitor FS closely.  -continue reduced home dose lantus 50units and pre-meal humalog 10mg qAC  -diabetic education    3. HTN:  -BP well controlled  -patient with history of chronic tachycardia, will monitor of tele    4. CKD:  -stable

## 2020-06-03 NOTE — H&P ADULT - NSHPPHYSICALEXAM_GEN_ALL_CORE
PHYSICAL EXAM:  T(C): 36.9 (06-02-20 @ 23:58), Max: 37.2 (06-02-20 @ 16:36)  HR: 99 (06-02-20 @ 23:58) (99 - 119)  BP: 134/71 (06-02-20 @ 23:58) (134/71 - 155/71)  RR: 19 (06-02-20 @ 23:58) (17 - 20)  SpO2: 100% (06-02-20 @ 23:58) (93% - 100%)  GENERAL: NAD, well-developed, obese  HEAD:  Atraumatic, Normocephalic  EYES: EOMI, PERRLA, conjunctiva and sclera clear  NECK: Supple, unable to assess JVP due to body habitus  CHEST/LUNG: Clear to auscultation bilaterally; No wheeze  HEART: Tachycardic, Regular rhythm; No murmurs, rubs, or gallops  ABDOMEN: Soft, Nontender, Nondistended; Bowel sounds present  EXTREMITIES: b/l lower extremity edema 2+ Peripheral Pulses, No clubbing, cyanosis  PSYCH: AAOx3  NEUROLOGY: non-focal  SKIN: No rashes or lesions PHYSICAL EXAM:  T(C): 36.9 (06-02-20 @ 23:58), Max: 37.2 (06-02-20 @ 16:36)  HR: 99 (06-02-20 @ 23:58) (99 - 119)  BP: 134/71 (06-02-20 @ 23:58) (134/71 - 155/71)  RR: 19 (06-02-20 @ 23:58) (17 - 20)  SpO2: 100% (06-02-20 @ 23:58) (93% - 100%)    GENERAL: NAD, well-developed, obese  EYES: EOMI, PERRL, conjunctiva and sclera clear  NECK: Supple, unable to assess JVP due to body habitus  CHEST/LUNG: Clear to auscultation bilaterally; No wheeze  HEART: Tachycardic, Regular rhythm; No murmurs, rubs, or gallops  ABDOMEN: Soft, Nontender, Nondistended; Bowel sounds present  EXTREMITIES: b/l lower extremity edema, 2+ Peripheral Pulses, No clubbing, cyanosis  PSYCH: AAOx3, nl affect and nl behavior  NEUROLOGY: non-focal  SKIN: No rashes or lesions

## 2020-06-03 NOTE — PHYSICAL THERAPY INITIAL EVALUATION ADULT - PATIENT PROFILE REVIEW, REHAB EVAL
No Formal Activity Order in the computer; spoke with RN Maria T Acevedo prior to PT evaluation--> Pt OK for PT consult/OOB activity/yes

## 2020-06-03 NOTE — MEDICAL STUDENT PROGRESS NOTE(EDUCATION) - NS MD HP STUD ASPLAN ASSES FT
JODY WORLEY is a 64yo woman with a history of insulin-dependent DM2, stage 3 CKD, ALLEY, and HTN along with recent hospitaliztion from COVID pneumonia and a remote history of angina, and palpitations presenting to [facility] due to new onset dyspnea on exertion and incidentally found to have RBBB. Labs show normal BNP and troponin. EKG showed RBBB, while imaging shows bilateral ground glass opacities with no evidence of DVT or pulmonary embolism. Patient's new onset dyspnea possibly due to deconditioning following viral infection, right heart strain due to pHTN, or acute myocarditis. JODY WORLEY is a 64yo woman with a history of insulin-dependent DM2, stage 2/3 CKD, obesity, ALLEY (intermittently adherent to CPAP), and HTN along with recent hospitaliztion from COVID pneumonia and a remote history of angina, and palpitations presenting to [facility] due to new onset dyspnea on exertion and incidentally found to have RBBB. Patient's fatigue possibly due to deconditioning following viral infection, right heart strain due to pHTN, or acute myocarditis. JODY WORLEY is a 64yo woman with a history of insulin-dependent DM2, stage 2/3 CKD, obesity, ALLEY (intermittently adherent to CPAP), and HTN along with recent hospitalization from COVID pneumonia presenting to [facility] due to continued fatigue after prior discharge & new RBBB. Patient's fatigue possibly due to deconditioning following viral infection, right heart strain due to pHTN 2/2 ALLEY progression, or acute myocarditis following acute COVID infection.

## 2020-06-03 NOTE — H&P ADULT - NSHPREVIEWOFSYSTEMS_GEN_ALL_CORE
REVIEW OF SYSTEMS:    CONSTITUTIONAL: No weakness, fevers or chills  EYES/ENT: No visual changes;  No vertigo or throat pain   NECK: No pain or stiffness  RESPIRATORY: No cough, wheezing, hemoptysis; No shortness of breath  CARDIOVASCULAR: No chest pain or palpitations, +dyspnea on exertion, + LE edema  GASTROINTESTINAL: No abdominal or epigastric pain. No nausea, vomiting, or hematemesis; No diarrhea or constipation. No melena or hematochezia.  GENITOURINARY: No dysuria, frequency or hematuria  NEUROLOGICAL: No numbness or weakness  SKIN: No itching, rashes REVIEW OF SYSTEMS:    CONSTITUTIONAL: No weakness, fevers or chills  EYES: No visual changes or eye discharge  ENT: No rhinorrhea or sore throat  NECK: No pain or stiffness  RESPIRATORY: No cough, wheezing, hemoptysis; No shortness of breath  CARDIOVASCULAR: No chest pain or palpitations, +dyspnea on exertion, + LE edema  GASTROINTESTINAL: No abdominal or epigastric pain. No nausea, vomiting, or hematemesis; No diarrhea or constipation. No melena or hematochezia.  GENITOURINARY: No dysuria, frequency or hematuria  NEUROLOGICAL: No numbness or weakness  SKIN: No itching, rashes

## 2020-06-03 NOTE — H&P ADULT - PROBLEM SELECTOR PLAN 2
- patient says her LE edema is worse now than ever before but has had intermittent LE edema.   - unclear if cardiac related vs. amlodipine induced.   - f/u TTE as above.

## 2020-06-03 NOTE — H&P ADULT - ATTENDING COMMENTS
Patient seen and examined on 6/3/2020, case discussed with Dr. Ady Mayberry, agree with assessment and plan as above. This is a 65F with history as above who presents with SOB and chest tightness for the past few weeks with outpatient work up showing her to have a new RBBB. Here EKG with IRBBB which is a new finding, concern for R heart disease. CTA Chest suboptimal study, negative for central PE but cannot evaluate further downstream vasculature. Currently patient still mildly tachycardic, worse with ambulation, but satting well on RA.   - Will check V/Q, TTE for further work up of patient's SOB/chest tightness   - Other management as per the assessment and plan above.

## 2020-06-03 NOTE — H&P ADULT - NSHPSOCIALHISTORY_GEN_ALL_CORE
The patient has not worked since being diagnosed with COVID and is likely going to retire. She does not smoke, drink or use recreational drugs.

## 2020-06-03 NOTE — H&P ADULT - PROBLEM SELECTOR PLAN 6
Transitions of Care Status:  1.  Name of PCP:  2.  PCP Contacted on Admission: [ ] Y    [x ] N  night admission  3.  PCP contacted at Discharge: [ ] Y    [ ] N    [ ] N/A  4.  Post-Discharge Appointment Date and Location:  5.  Summary of Handoff given to PCP: Transitions of Care Status:  1.  Name of PCP: Dr. Crista Grajeda  2.  PCP Contacted on Admission: [ ] Y    [x ] N  night admission  3.  PCP contacted at Discharge: [ ] Y    [ ] N    [ ] N/A  4.  Post-Discharge Appointment Date and Location:  5.  Summary of Handoff given to PCP:

## 2020-06-03 NOTE — H&P ADULT - NSICDXPASTMEDICALHX_GEN_ALL_CORE_FT
PAST MEDICAL HISTORY:  CKD (chronic kidney disease) stage 3, GFR 30-59 ml/min     Hyperlipemia     Hypertension     Open-angle glaucoma of left eye, indeterminate stage, unspecified open-angle glaucoma type     ALLEY (obstructive sleep apnea)     Type 2 diabetes mellitus with complication, with long-term current use of insulin

## 2020-06-03 NOTE — H&P ADULT - HISTORY OF PRESENT ILLNESS
The patient is a 65 year old woman with a history of DM, CKD and HTN presenting with dyspnea on exertion for about 2 weeks. The patient was discharged from the hospital for COVID on 4/6. She went to [facility] with shortness of breath where she was hospitalized. SHe improved and was discharged home on xarelto 10 mg qd for 30 days. Starting about 2 weeks ago the patient started having some dyspnea on exertion when she walks relatively short distances. She has shortness of breath when she goes to take out the trash as well. She reports also having some central chest tightness that is irrespective of activity or eating. She had a televisit with her PCP who referred her to a cardiologist. The cardiologist did an EKG who saw a change that was not present in prior EKGs and sent her to the hospital. The patient has no chest pain, no nausea, no vomiting, no lightheadedness, no palpitations.     Of note the patient had a cardiac cath in 2018 which showed clean coronaries (available in HIE)    In the ED the patient was found to be tachycardic to 119, T 99 /71 satting 100% on RA. CTA was suboptimal for full evaluation but did not reveal any PE. The patient is a 65 year old woman with a history of DM, CKD and HTN presenting with dyspnea on exertion for about 2 weeks. The patient was discharged from the hospital for COVID on 4/6. She went to [facility] with shortness of breath where she was hospitalized. SHe improved and was discharged home on xarelto 10 mg qd for 30 days. Starting about 2 weeks ago the patient started having some dyspnea on exertion when she walks relatively short distances. She has shortness of breath when she goes to take out the trash as well. She reports also having some central chest tightness that is irrespective of activity or eating. She had a televisit with her PCP who referred her to a cardiologist. The cardiologist did an EKG who saw a change that was not present in prior EKGs and sent her to the hospital. The patient has no chest pain, no nausea, no vomiting, no lightheadedness, no palpitations.     Of note the patient had a cardiac cath in 2018 which showed clean coronaries (available in HIE)    In the ED the patient was found to be tachycardic to 119, T 99 /71 satting 100% on RA. CTA was suboptimal for full evaluation but did not reveal any PE in central arteries.

## 2020-06-03 NOTE — PROGRESS NOTE ADULT - PROBLEM SELECTOR PLAN 2
- patient says her LE edema is worse now than ever before but has had intermittent LE edema.   - unclear if cardiac related vs. amlodipine induced.   - f/u TTE as above. - patient says her LE edema is waxing/waning, currently worse but has previously been to this elvel and recedes with time.  - unclear if cardiac related vs. more likely amlodipine induced with no JVD and clear lungs  - f/u TTE as above. - patient says her LE edema is waxing/waning, currently worse but has previously been to this level and recedes with time.  - unclear if cardiac related vs. more likely amlodipine induced with no JVD and clear lungs  - f/u TTE as above.

## 2020-06-03 NOTE — MEDICAL STUDENT PROGRESS NOTE(EDUCATION) - SUBJECTIVE AND OBJECTIVE BOX
JODY WORLEY is a 66yo woman with a history of DM2 insulin-dependent, stage 3 CKD, ALLEY, and HTN presenting to [facility] ED on 6/2 due to 2 weeks of dyspnea no exertion and lower extremity edema. In early April patient had a 5 day hospital stay for COVID pneumonia at [facility], she was ultimately discharged with 30 days of xeralto. Then, 2 weeks ago she started having some dyspnea with light activity. She became short of breath after standing for 10 minutes, preventing her from doing light chores around the house (e.g. standing at the stove). Prior to 2 weeks, she was able to stand or walk for as long as she wanted. She does need to prop up her head at night with pillows due to her ALLEY, but had not needed to use more pillows recently. Yesterday (6/2), she noticed swelling in her feet and ankles, which was worse than any swelling had been in the past. She does confirm needing wider shoes over the past few years. Due to this worsening swelling, she called her PCP (Patti Grajeda in [place]) who referred her to Cardiology (Estrella Barton in [place] [phone]). She recieved an EKG, which showed new changes, so she came to the ED.     Short ED course: Patient received EKG, LE US, and CTA. EKG showed new RBBB. LE US showed no DVTs. CTA was suboptimal, but did show bilateral ground glass opacities and did NOT show central right or left main pulmonary embolism.    Patient does have a history of angina, starting at around 30 years ago, though does not endorse any recent chest pain. She has had palpitations in the past. The last time she had them was in 2018, after which she received a cardiac catheter evaluation, showing clean coronary arteries.    REVIEW OF SYSTEMS:    CONSTITUTIONAL: No weakness, fevers or chills  EYES/ENT: No visual changes;  No vertigo or throat pain   NECK: No pain or stiffness  RESPIRATORY: Dyspnea on exertion (see history), denies orthopnea, history of ALLEY  CARDIOVASCULAR: No chest pain or palpitations. LE edema.  GASTROINTESTINAL: No abdominal or epigastric pain. No nausea, vomiting, or hematemesis; No diarrhea or constipation. No melena or hematochezia.  GENITOURINARY: No change in urinary frequency, though frequently urinates due to diuretics. No dysuria or hematuria.   NEUROLOGICAL: No numbness or weakness  SKIN: Left axillary bump which patient attributes to new deodarant No itching, rashes    PAST MEDICAL & SURGICAL HISTORY:  ALLEY (obstructive sleep apnea)  CKD (chronic kidney disease) stage 3, GFR 30-59 ml/min  Open-angle glaucoma of left eye, indeterminate stage, unspecified open-angle glaucoma type  Type 2 diabetes mellitus with complication, with long-term current use of insulin  Hyperlipemia  Hypertension  H/O bilateral breast implants: 1985  Fibroids: history of removal two times      Family history  - Mom: CAD, DM2  - Dad: CAD  - Brother: Recurrent prostate cancer; "random increases in BP" with unclear cause    Vital Signs Last 24 Hrs  T(C): 36.7 (03 Jun 2020 04:38), Max: 37.2 (02 Jun 2020 16:36)  T(F): 98 (03 Jun 2020 04:38), Max: 99 (02 Jun 2020 16:36)  HR: 102 (03 Jun 2020 08:30) (99 - 119)  BP: 134/74 (03 Jun 2020 08:30) (134/71 - 155/71)  BP(mean): --  RR: 19 (03 Jun 2020 08:30) (17 - 20)  SpO2: 97% (03 Jun 2020 08:30) (93% - 100%)    PHYSICAL EXAM:  GENERAL: NAD, well-groomed, well-developed  HEENT - NC/AT, Moist mucous membranes  NECK: Supple, No JVD  CHEST/LUNG: Clear to auscultation bilaterally; No rales, rhonchi, wheezing  HEART: Regular rate and rhythm; No murmurs, rubs, or gallops appreciated  ABDOMEN: Soft, Nontender, Nondistended  EXTREMITIES: Non pitting edema noted in lower extremities. 2+ Peripheral Pulses, No clubbing, cyanosis.  NEURO:  No Focal deficits, sensory and motor intact  SKIN: 1.5 cm mass in left axilla with central white comodone appearance. No other rashes or lesions noted.    LABS:  A1C with Estimated Average Glucose (06.03.20 @ 06:00)    A1C with Estimated Average Glucose: 9.5    POCT  Blood Glucose (06.03.20 @ 08:47)    POCT Blood Glucose.: 173 mg/dL    Blood Gas Venous Comprehensive (06.02.20 @ 20:36)    Blood Gas Venous - Lactate: 1.0: Please note updated reference range. mmol/L    Blood Gas Venous - Chloride: 109 mmol/L    Blood Gas Venous - Creatinine: 0.97 mg/dL    pH, Venous: 7.35 pH    pCO2, Venous: 51 mmHg    pO2, Venous: 32 mmHg    HCO3, Venous: 25 mmol/L    Base Excess, Venous: 2.5: REFERENCE RANGE = -3 + 2 mmol/L mmol/L    Oxygen Saturation, Venous: 56.7 %    Blood Gas Venous - Sodium: 142 mmol/L    Blood Gas Venous - Potassium: 3.4 mmol/L    Blood Gas Venous - Glucose: 134 mg/dL    Blood Gas Venous - Hemoglobin: 12.5 g/dL    Blood Gas Venous - Hematocrit: 38.4 %    Troponin T, High Sensitivity (06.02.20 @ 18:54)    Troponin T, High Sensitivity: 22    Complete Blood Count + Automated Diff (06.02.20 @ 17:42)    Nucleated RBC #: 0 K/uL    WBC Count: 8.37 K/uL    RBC Count: 4.65 M/uL    Hemoglobin: 13.6 g/dL    Hematocrit: 42.3 %    Mean Cell Volume: 91.0 fL    Mean Cell Hemoglobin: 29.2 pg    Mean Cell Hemoglobin Conc: 32.2 %    Red Cell Distrib Width: 15.3 %    Platelet Count - Automated: 354 K/uL    MPV: 11.0 fl    Auto Neutrophil #: 3.44 K/uL    Auto Lymphocyte #: 3.70 K/uL    Auto Monocyte #: 1.02 K/uL    Auto Eosinophil #: 0.14 K/uL    Auto Basophil #: 0.05 K/uL    Auto Neutrophil %: 41.1 %    Auto Lymphocyte %: 44.2 %    Auto Monocyte %: 12.2 %    Auto Eosinophil %: 1.7 %    Auto Basophil %: 0.6 %    Auto Immature Granulocyte %: 0.2: (Includes meta, myelo and promyelocytes) %    Comprehensive Metabolic Panel (06.02.20 @ 17:42)    Sodium, Serum: 143 mmol/L    Potassium, Serum: 3.7: SPECIMEN MODERATELY HEMOLYZED mmol/L    Chloride, Serum: 101 mmol/L    Carbon Dioxide, Serum: 26 mmol/L    Anion Gap, Serum: 16 mmo/L    Blood Urea Nitrogen, Serum: 22 mg/dL    Creatinine, Serum: 1.13 mg/dL    Glucose, Serum: 100 mg/dL    Calcium, Total Serum: 10.1 mg/dL    Protein Total, Serum: 7.5: SPECIMEN MODERATELY HEMOLYZED g/dL    Albumin, Serum: 4.4 g/dL    Bilirubin Total, Serum: 0.4 mg/dL    Alkaline Phosphatase, Serum: 82 u/L    Aspartate Aminotransferase (AST/SGOT): 22: SPECIMEN MODERATELY HEMOLYZED u/L    Alanine Aminotransferase (ALT/SGPT): 13: SPECIMEN MODERATELY HEMOLYZED u/L    eGFR if : 59 mL/min    Prothombin time, plasma: 11  INR: 0.97  aPTT: 28    Serum pro-BNP: 43.21    RADIOLOGY & ADDITIONAL TESTS:    < from: US Duplex Venous Lower Ext Complete, Bilateral (06.02.20 @ 18:26) >  EXAM:  US DPLX LWR EXT VEINS COMPL BI    PROCEDURE DATE:  Jun 2 2020   IMPRESSION:   No evidence of deep venous thrombosis in either lower extremity.  < end of copied text >    < from: CT Angio Chest w/ IV Cont (06.02.20 @ 19:17) >  EXAM:  CT ANGIO CHEST (W)AW IC    PROCEDURE DATE:  Jun 2 2020   IMPRESSION:   1.  No central right or left main pulmonary embolism.The lobar, segmental and subsegmental branches are suboptimally opacified.  2.  Bilateral groundglass opacities are of uncertain etiology. Given the reported history, this could be the sequela of the patient's reported infection, however, the findings are not specific.    Imaging Personally Reviewed:  [X] YES  [ ] NO      Meds  MEDICATIONS  (STANDING):  amLODIPine   Tablet 10 milliGRAM(s) Oral daily  atorvastatin 40 milliGRAM(s) Oral at bedtime  dextrose 5%. 1000 milliLiter(s) (50 mL/Hr) IV Continuous <Continuous>  dextrose 50% Injectable 12.5 Gram(s) IV Push once  dextrose 50% Injectable 25 Gram(s) IV Push once  dextrose 50% Injectable 25 Gram(s) IV Push once  enoxaparin Injectable 40 milliGRAM(s) SubCutaneous two times a day  hydrochlorothiazide 12.5 milliGRAM(s) Oral daily  insulin glargine Injectable (LANTUS) 50 Unit(s) SubCutaneous two times a day  insulin lispro (HumaLOG) corrective regimen sliding scale   SubCutaneous three times a day before meals  insulin lispro (HumaLOG) corrective regimen sliding scale   SubCutaneous at bedtime  insulin lispro Injectable (HumaLOG) 20 Unit(s) SubCutaneous three times a day before meals  lisinopril 40 milliGRAM(s) Oral daily  metoprolol succinate ER 25 milliGRAM(s) Oral daily  oxybutynin 10 milliGRAM(s) Oral daily    MEDICATIONS  (PRN):  dextrose 40% Gel 15 Gram(s) Oral once PRN Blood Glucose LESS THAN 70 milliGRAM(s)/deciliter  glucagon  Injectable 1 milliGRAM(s) IntraMuscular once PRN Glucose LESS THAN 70 milligrams/deciliter JODY WORLEY is a 66yo woman with a history of DM2 (requires insulin), stage 2/3 CKD, ALLEY, obesity and HTN presenting to [facility] ED on 6/2 due to 2 weeks of dyspnea on exertion and lower extremity edema, who was also found to have a new RBBB. Patient had a 5 day hospital stay for COVID pneumonia at [facility], she was discharged on 4/6/2020. Following discharge, she took xeralto for 30 days. Then, 2 weeks ago she started having some dyspnea with light activity. She became short of breath after standing for 10 minutes, preventing her from doing light chores around the house (e.g. standing at the stove). Prior to 2 weeks, she was able to stand or walk for as long as she wanted. She does need to prop up her head at night with pillows due to her ALLEY, but had not needed to use more pillows recently. Yesterday (6/2), she noticed swelling in her feet and ankles, which was worse than any swelling had been in the past. She does confirm needing wider shoes over the past few years, with intermittent lower extremity edema. Since her lower extremity edema was worse yesterday, she called her PCP (Patti Grajeda in [place]) who referred her to Cardiology (Estrella Barton in [place] [phone]). She received an EKG, which showed new changes, so she came to the ED.     Short ED course: Patient received EKG, LE US, and CTA. EKG showed new RBBB. LE US showed no DVTs. CTA was suboptimal, but did show bilateral ground glass opacities and did NOT show central right or left main pulmonary embolism.    Patient does have a history of angina, starting ~30 years ago, though does not endorse any recent chest pain. She has had palpitations in the past. The last time she had them was in 2018, after which she received a cardiac catheter evaluation, showing clean coronary arteries.     REVIEW OF SYSTEMS:    CONSTITUTIONAL: No weakness, fevers or chills  EYES/ENT: No visual changes;  No vertigo or throat pain   NECK: No pain or stiffness  RESPIRATORY: Dyspnea on exertion (see history), denies orthopnea, history of ALLEY  CARDIOVASCULAR: No chest pain or palpitations. LE edema.  GASTROINTESTINAL: No abdominal or epigastric pain. No nausea, vomiting, or hematemesis; No diarrhea or constipation. No melena or hematochezia.  GENITOURINARY: No change in urinary frequency, though frequently urinates due to diuretics. No dysuria or hematuria.   NEUROLOGICAL: No numbness or weakness  SKIN: Left axillary bump which patient attributes to new deodarant No itching, rashes    PAST MEDICAL & SURGICAL HISTORY:  ALLEY (obstructive sleep apnea)  CKD (chronic kidney disease) stage 3, GFR 30-59 ml/min  Open-angle glaucoma of left eye, indeterminate stage, unspecified open-angle glaucoma type  Type 2 diabetes mellitus with complication, with long-term current use of insulin  Hyperlipemia  Hypertension  H/O bilateral breast implants: 1985  Fibroids: history of removal two times      Family history  - Mom: CAD, DM2  - Dad: CAD  - Brother: Recurrent prostate cancer; "random increases in BP" with unclear cause    Vital Signs Last 24 Hrs  T(C): 36.7 (03 Jun 2020 04:38), Max: 37.2 (02 Jun 2020 16:36)  T(F): 98 (03 Jun 2020 04:38), Max: 99 (02 Jun 2020 16:36)  HR: 102 (03 Jun 2020 08:30) (99 - 119)  BP: 134/74 (03 Jun 2020 08:30) (134/71 - 155/71)  BP(mean): --  RR: 19 (03 Jun 2020 08:30) (17 - 20)  SpO2: 97% (03 Jun 2020 08:30) (93% - 100%)    PHYSICAL EXAM:  GENERAL: NAD, well-groomed, well-developed  HEENT - NC/AT, Moist mucous membranes  NECK: Supple, No JVD  CHEST/LUNG: Clear to auscultation bilaterally; No rales, rhonchi, wheezing  HEART: Regular rate and rhythm; No murmurs, rubs, or gallops appreciated  ABDOMEN: Obese, soft, nontender, nondistended  EXTREMITIES: Non-pitting edema noted in lower extremities. 2+ Peripheral Pulses, No clubbing, cyanosis.  NEURO:  No Focal deficits, sensory and motor intact  SKIN: 1.5 cm papule in left axilla with central white comedone appearance. Papule is non tender and nonfluctuant No other rashes or lesions noted.    LABS:  A1C with Estimated Average Glucose (06.03.20 @ 06:00)    A1C with Estimated Average Glucose: 9.5    POCT  Blood Glucose (06.03.20 @ 08:47)    POCT Blood Glucose.: 173 mg/dL    Blood Gas Venous Comprehensive (06.02.20 @ 20:36)    Blood Gas Venous - Lactate: 1.0: Please note updated reference range. mmol/L    Blood Gas Venous - Chloride: 109 mmol/L    Blood Gas Venous - Creatinine: 0.97 mg/dL    pH, Venous: 7.35 pH    pCO2, Venous: 51 mmHg    pO2, Venous: 32 mmHg    HCO3, Venous: 25 mmol/L    Base Excess, Venous: 2.5: REFERENCE RANGE = -3 + 2 mmol/L mmol/L    Oxygen Saturation, Venous: 56.7 %    Blood Gas Venous - Sodium: 142 mmol/L    Blood Gas Venous - Potassium: 3.4 mmol/L    Blood Gas Venous - Glucose: 134 mg/dL    Blood Gas Venous - Hemoglobin: 12.5 g/dL    Blood Gas Venous - Hematocrit: 38.4 %    Troponin T, High Sensitivity (06.02.20 @ 18:54)    Troponin T, High Sensitivity: 22    Complete Blood Count + Automated Diff (06.02.20 @ 17:42)    Nucleated RBC #: 0 K/uL    WBC Count: 8.37 K/uL    RBC Count: 4.65 M/uL    Hemoglobin: 13.6 g/dL    Hematocrit: 42.3 %    Mean Cell Volume: 91.0 fL    Mean Cell Hemoglobin: 29.2 pg    Mean Cell Hemoglobin Conc: 32.2 %    Red Cell Distrib Width: 15.3 %    Platelet Count - Automated: 354 K/uL    MPV: 11.0 fl    Auto Neutrophil #: 3.44 K/uL    Auto Lymphocyte #: 3.70 K/uL    Auto Monocyte #: 1.02 K/uL    Auto Eosinophil #: 0.14 K/uL    Auto Basophil #: 0.05 K/uL    Auto Neutrophil %: 41.1 %    Auto Lymphocyte %: 44.2 %    Auto Monocyte %: 12.2 %    Auto Eosinophil %: 1.7 %    Auto Basophil %: 0.6 %    Auto Immature Granulocyte %: 0.2: (Includes meta, myelo and promyelocytes) %    Comprehensive Metabolic Panel (06.02.20 @ 17:42)    Sodium, Serum: 143 mmol/L    Potassium, Serum: 3.7: SPECIMEN MODERATELY HEMOLYZED mmol/L    Chloride, Serum: 101 mmol/L    Carbon Dioxide, Serum: 26 mmol/L    Anion Gap, Serum: 16 mmo/L    Blood Urea Nitrogen, Serum: 22 mg/dL    Creatinine, Serum: 1.13 mg/dL    Glucose, Serum: 100 mg/dL    Calcium, Total Serum: 10.1 mg/dL    Protein Total, Serum: 7.5: SPECIMEN MODERATELY HEMOLYZED g/dL    Albumin, Serum: 4.4 g/dL    Bilirubin Total, Serum: 0.4 mg/dL    Alkaline Phosphatase, Serum: 82 u/L    Aspartate Aminotransferase (AST/SGOT): 22: SPECIMEN MODERATELY HEMOLYZED u/L    Alanine Aminotransferase (ALT/SGPT): 13: SPECIMEN MODERATELY HEMOLYZED u/L    eGFR if : 59 mL/min    Prothombin time, plasma: 11  INR: 0.97  aPTT: 28    Serum pro-BNP: 43.21      RADIOLOGY & ADDITIONAL TESTS:    < from: US Duplex Venous Lower Ext Complete, Bilateral (06.02.20 @ 18:26) >  EXAM:  US DPLX LWR EXT VEINS COMPL BI    PROCEDURE DATE:  Jun 2 2020   IMPRESSION:   No evidence of deep venous thrombosis in either lower extremity.  < end of copied text >    < from: CT Angio Chest w/ IV Cont (06.02.20 @ 19:17) >  EXAM:  CT ANGIO CHEST (W)AW IC    PROCEDURE DATE:  Jun 2 2020   IMPRESSION:   1.  No central right or left main pulmonary embolism.The lobar, segmental and subsegmental branches are suboptimally opacified.  2.  Bilateral groundglass opacities are of uncertain etiology. Given the reported history, this could be the sequela of the patient's reported infection, however, the findings are not specific.    Imaging Personally Reviewed:  [X] YES  [ ] NO      Meds  MEDICATIONS  (STANDING):  amLODIPine   Tablet 10 milliGRAM(s) Oral daily  atorvastatin 40 milliGRAM(s) Oral at bedtime  dextrose 5%. 1000 milliLiter(s) (50 mL/Hr) IV Continuous <Continuous>  dextrose 50% Injectable 12.5 Gram(s) IV Push once  dextrose 50% Injectable 25 Gram(s) IV Push once  dextrose 50% Injectable 25 Gram(s) IV Push once  enoxaparin Injectable 40 milliGRAM(s) SubCutaneous two times a day  hydrochlorothiazide 12.5 milliGRAM(s) Oral daily  insulin glargine Injectable (LANTUS) 50 Unit(s) SubCutaneous two times a day  insulin lispro (HumaLOG) corrective regimen sliding scale   SubCutaneous three times a day before meals  insulin lispro (HumaLOG) corrective regimen sliding scale   SubCutaneous at bedtime  insulin lispro Injectable (HumaLOG) 20 Unit(s) SubCutaneous three times a day before meals  lisinopril 40 milliGRAM(s) Oral daily  metoprolol succinate ER 25 milliGRAM(s) Oral daily  oxybutynin 10 milliGRAM(s) Oral daily    MEDICATIONS  (PRN):  dextrose 40% Gel 15 Gram(s) Oral once PRN Blood Glucose LESS THAN 70 milliGRAM(s)/deciliter  glucagon  Injectable 1 milliGRAM(s) IntraMuscular once PRN Glucose LESS THAN 70 milligrams/deciliter JODY WORLEY is a 66yo woman with a history of DM2 (requires insulin), stage 2/3 CKD, ALLEY, obesity and HTN presenting to [facility] ED on 6/2 due to 2 weeks of dyspnea on exertion and lower extremity edema, who was also found to have a new RBBB. Patient had a 5 day hospital stay for COVID pneumonia at [facility], she was discharged on 4/6/2020. Following discharge, she took xeralto for 30 days. Then, 2 weeks ago she started having some dyspnea with light activity. She became short of breath and lightheaded after standing for 10 minutes, preventing her from doing light chores around the house (e.g. standing at the stove). Prior to 2 weeks, she was able to stand or walk for as long as she wanted. She does need to prop up her head at night with pillows due to her ALLEY, but had not needed to use more pillows recently. She intermittently uses her CPAP machine at home. She has intermittent lower extremity edema, which was "worse than it had ever been before," prior to admission. She does confirm needing wider shoes over the past few years. Due to this worsening, she called her PCP (Dr. Odalis Grajeda in [place]) who referred her to Cardiology (Estrella Barton in [place] [phone]). She received an EKG, which showed new changes, so she came to the ED.     Short ED course: Patient received EKG, LE US, and CTA. EKG showed new RBBB. LE US showed no DVTs. CTA was suboptimal ("segmental and subsegmental branches are suboptimally opacified"), but did show bilateral ground glass opacities and did NOT show central right or left main pulmonary embolism.     Patient does have a remote history of angina, starting ~30 years ago, though does not endorse any recent chest pain. She has had palpitations in the past. The last time she had them was in 2018, after which she received a cardiac catheter evaluation, showing clean coronary arteries.         REVIEW OF SYSTEMS:    CONSTITUTIONAL: No weakness, fevers or chills  EYES/ENT: No visual changes;  No vertigo or throat pain   NECK: No pain or stiffness  RESPIRATORY: Dyspnea on exertion (see history), denies orthopnea, history of ALLEY  CARDIOVASCULAR: No chest pain or palpitations. LE edema.  GASTROINTESTINAL: No abdominal or epigastric pain. No nausea, vomiting, or hematemesis; No diarrhea or constipation. No melena or hematochezia.  GENITOURINARY: No change in urinary frequency, though frequently urinates due to diuretics. No dysuria or hematuria.   NEUROLOGICAL: No numbness or weakness  SKIN: Non-painful, left axillary bump which patient attributes to new deodorant No itching, rashes    PAST MEDICAL & SURGICAL HISTORY:  ALLEY (obstructive sleep apnea)  CKD (chronic kidney disease) stage 3, GFR 30-59 ml/min  Open-angle glaucoma of left eye, indeterminate stage, unspecified open-angle glaucoma type  Type 2 diabetes mellitus with complication, with long-term current use of insulin  Hyperlipemia  Hypertension  H/O bilateral breast implants: 1985  Fibroids: history of removal two times      Family history  - Mom: CAD, DM2  - Dad: CAD  - Brother: DM2, Recurrent prostate cancer; "random increases in BP" with unclear cause    Vital Signs Last 24 Hrs  T(C): 36.7 (03 Jun 2020 04:38), Max: 37.2 (02 Jun 2020 16:36)  T(F): 98 (03 Jun 2020 04:38), Max: 99 (02 Jun 2020 16:36)  HR: 102 (03 Jun 2020 08:30) (99 - 119)  BP: 134/74 (03 Jun 2020 08:30) (134/71 - 155/71)  RR: 19 (03 Jun 2020 08:30) (17 - 20)  SpO2: 97% (03 Jun 2020 08:30) (93% - 100%)    PHYSICAL EXAM:  GENERAL: NAD, well-groomed, well-developed  HEENT - NC/AT, Moist mucous membranes  NECK: Supple, No JVD  CHEST/LUNG: Clear to auscultation bilaterally; No rales, rhonchi, wheezing  HEART: Regular rate and rhythm; No murmurs, rubs, or gallops appreciated  ABDOMEN: Obese, soft, nontender, nondistended  EXTREMITIES: Non-pitting edema noted in lower extremities. 2+ Peripheral Pulses, No clubbing, cyanosis.  NEURO:  No Focal deficits, sensory and motor intact  SKIN: 1.5 cm papule in left axilla with central white comedone. Papule is non tender and nonfluctuant. No other rashes or lesions noted.    LABS:  A1C with Estimated Average Glucose (06.03.20 @ 06:00)    A1C with Estimated Average Glucose: 9.5    POCT  Blood Glucose (06.03.20 @ 08:47)    POCT Blood Glucose.: 173 mg/dL    Blood Gas Venous Comprehensive (06.02.20 @ 20:36)    Blood Gas Venous - Lactate: 1.0: Please note updated reference range. mmol/L    Blood Gas Venous - Chloride: 109 mmol/L    Blood Gas Venous - Creatinine: 0.97 mg/dL    pH, Venous: 7.35 pH    pCO2, Venous: 51 mmHg    pO2, Venous: 32 mmHg    HCO3, Venous: 25 mmol/L    Base Excess, Venous: 2.5: REFERENCE RANGE = -3 + 2 mmol/L mmol/L    Oxygen Saturation, Venous: 56.7 %    Blood Gas Venous - Sodium: 142 mmol/L    Blood Gas Venous - Potassium: 3.4 mmol/L    Blood Gas Venous - Glucose: 134 mg/dL    Blood Gas Venous - Hemoglobin: 12.5 g/dL    Blood Gas Venous - Hematocrit: 38.4 %    Troponin T, High Sensitivity (06.02.20 @ 18:54): 22  Troponin T, High Sensitivity (06.02.20 @ 17:42): 23    Serum pro-BNP: 43.21    Complete Blood Count + Automated Diff (06.02.20 @ 17:42)    Nucleated RBC #: 0 K/uL    WBC Count: 8.37 K/uL    RBC Count: 4.65 M/uL    Hemoglobin: 13.6 g/dL    Hematocrit: 42.3 %    Mean Cell Volume: 91.0 fL    Mean Cell Hemoglobin: 29.2 pg    Mean Cell Hemoglobin Conc: 32.2 %    Red Cell Distrib Width: 15.3 %    Platelet Count - Automated: 354 K/uL    MPV: 11.0 fl    Auto Neutrophil #: 3.44 K/uL    Auto Lymphocyte #: 3.70 K/uL    Auto Monocyte #: 1.02 K/uL    Auto Eosinophil #: 0.14 K/uL    Auto Basophil #: 0.05 K/uL    Auto Neutrophil %: 41.1 %    Auto Lymphocyte %: 44.2 %    Auto Monocyte %: 12.2 %    Auto Eosinophil %: 1.7 %    Auto Basophil %: 0.6 %    Auto Immature Granulocyte %: 0.2: (Includes meta, myelo and promyelocytes) %    Comprehensive Metabolic Panel (06.02.20 @ 17:42)    Sodium, Serum: 143 mmol/L    Potassium, Serum: 3.7: SPECIMEN MODERATELY HEMOLYZED mmol/L    Chloride, Serum: 101 mmol/L    Carbon Dioxide, Serum: 26 mmol/L    Anion Gap, Serum: 16 mmo/L    Blood Urea Nitrogen, Serum: 22 mg/dL    Creatinine, Serum: 1.13 mg/dL    Glucose, Serum: 100 mg/dL    Calcium, Total Serum: 10.1 mg/dL    Protein Total, Serum: 7.5: SPECIMEN MODERATELY HEMOLYZED g/dL    Albumin, Serum: 4.4 g/dL    Bilirubin Total, Serum: 0.4 mg/dL    Alkaline Phosphatase, Serum: 82 u/L    Aspartate Aminotransferase (AST/SGOT): 22: SPECIMEN MODERATELY HEMOLYZED u/L    Alanine Aminotransferase (ALT/SGPT): 13: SPECIMEN MODERATELY HEMOLYZED u/L    eGFR if : 59 mL/min    Prothombin time, plasma: 11  INR: 0.97  aPTT: 28    RADIOLOGY & ADDITIONAL TESTS:    < from: US Duplex Venous Lower Ext Complete, Bilateral (06.02.20 @ 18:26) >  EXAM:  US DPLX LWR EXT VEINS COMPL BI    PROCEDURE DATE:  Jun 2 2020   IMPRESSION:   No evidence of deep venous thrombosis in either lower extremity.  < end of copied text >    < from: CT Angio Chest w/ IV Cont (06.02.20 @ 19:17) >  EXAM:  CT ANGIO CHEST (W)AW IC    PROCEDURE DATE:  Jun 2 2020   IMPRESSION:   1.  No central right or left main pulmonary embolism. The lobar, segmental and subsegmental branches are suboptimally opacified.  2.  Bilateral groundglass opacities are of uncertain etiology. Given the reported history, this could be the sequela of the patient's reported infection, however, the findings are not specific.    Imaging Personally Reviewed:  [X] YES  [ ] NO      Meds  MEDICATIONS  (STANDING):  amLODIPine   Tablet 10 milliGRAM(s) Oral daily  atorvastatin 40 milliGRAM(s) Oral at bedtime  dextrose 5%. 1000 milliLiter(s) (50 mL/Hr) IV Continuous <Continuous>  dextrose 50% Injectable 12.5 Gram(s) IV Push once  dextrose 50% Injectable 25 Gram(s) IV Push once  dextrose 50% Injectable 25 Gram(s) IV Push once  enoxaparin Injectable 40 milliGRAM(s) SubCutaneous two times a day  hydrochlorothiazide 12.5 milliGRAM(s) Oral daily  insulin glargine Injectable (LANTUS) 50 Unit(s) SubCutaneous two times a day  insulin lispro (HumaLOG) corrective regimen sliding scale   SubCutaneous three times a day before meals  insulin lispro (HumaLOG) corrective regimen sliding scale   SubCutaneous at bedtime  insulin lispro Injectable (HumaLOG) 20 Unit(s) SubCutaneous three times a day before meals  lisinopril 40 milliGRAM(s) Oral daily  metoprolol succinate ER 25 milliGRAM(s) Oral daily  oxybutynin 10 milliGRAM(s) Oral daily    MEDICATIONS  (PRN):  dextrose 40% Gel 15 Gram(s) Oral once PRN Blood Glucose LESS THAN 70 milliGRAM(s)/deciliter  glucagon  Injectable 1 milliGRAM(s) IntraMuscular once PRN Glucose LESS THAN 70 milligrams/deciliter

## 2020-06-03 NOTE — MEDICAL STUDENT PROGRESS NOTE(EDUCATION) - NS MD HP STUD ASPLAN PLAN FT
Problem/Plan 1: Dyspnea on Exertion  - Differential includes deconditioning following COVID19 infection, right heart strain due to pHTN, or myocarditis.  - TTE looking for R heart strain or evidence of right heart failure.  - Ground glass opacities on CTA likely a result from COVID infection. May be causing pHTN and hence right heart strain.   - HF unlikely due to physical exam findings and normal pro-BNP.  - Given cardiac history and HLD, CAD is possible.   - Pulmonary embolus still possible given prior CTA was suboptimal. V/Q scan may rule out PE. Consider anticoagulation until V/Q scan is done.     Problem/plan 2: Lower extremity edema  - Appears to be acute on chronic exacerbation.  - Given its not pitting nature, unlikely to be cardiac related. Possibly due to CKD or side effect from amlodipine.    Problem/plan 3: Type 2 DM complicated by CKD stage 3 and use of insulin  - HbA1cL 9.5%  - f/u HbA1c  - continue home Lantus, slightly reduced dose  - Humalog 20 U premeal with sliding scale    Problem/plan 4: Essential HTN  - Continue home amlodipine, ACE, metroprolol, and HCTZ    Problem/plan 5: Prophylactic measures  - DVT prophylaxis: subQ lovenox    Problem/plan 6: Discharge planning  - Will be d/c'ed home Problem/Plan 1: Dyspnea on Exertion  - Differential includes deconditioning following COVID19 infection, right heart strain due to pHTN 2/2 ALLEY progression, or post-COVID viral myocarditis.  - TTE looking for R heart strain or evidence of right heart failure.  - Ground glass opacities on CTA likely residual from COVID infection. New infection less likely given normal WBC and afebrile temperatures.    - HF unlikely due to unremarkable lung and cardiac physical exam findings and normal pro-BNP.  - Given cardiac history and HLD, CAD is possible. However, MI is unlikely due to no change in troponin over time.   - Subsegmental pulmonary emboli possible given prior CTA was suboptimal and prior COVID infection. Order D-dimer to rule out pulmonary emboli.     Problem/plan 2: Lower extremity edema  - Appears to be acute on chronic exacerbation.  - Given its largely, non-pitting nature, unlikely to be cardiac related. Possibly due to CKD or side effect from amlodipine.    Problem/plan 3: Type 2 DM, requiring insulin and complicated by stage 2/3 CKD   - HbA1cL 9.5%  - Home Lantus 55 U, premeal Humalog 25 U.   - Hospital insulin to be reduced: 50 U Lantus and sliding premeal insulin     Problem/plan 4: Essential HTN  - Continue home amlodipine, lisinopril, HCTZ, and metoprolol    Problem/plan 5: Prophylactic measures  - DVT prophylaxis: heparin    Problem/plan 6: Discharge planning  - Will be d/c'ed home

## 2020-06-03 NOTE — H&P ADULT - NSHPLABSRESULTS_GEN_ALL_CORE
13.6   8.37  )-----------( 354      ( 02 Jun 2020 17:42 )             42.3       06-02    143  |  101  |  22  ----------------------------<  100<H>  3.7   |  26  |  1.13    Ca    10.1      02 Jun 2020 17:42    TPro  7.5  /  Alb  4.4  /  TBili  0.4  /  DBili  x   /  AST  22  /  ALT  13  /  AlkPhos  82  06-02        PT/INR - ( 02 Jun 2020 17:42 )   PT: 11.0 SEC;   INR: 0.97     PTT - ( 02 Jun 2020 17:42 )  PTT:28.0 SEC    Troponin T, High Sensitivity (06.02.20 @ 17:42)    Troponin T, High Sensitivity: 23:   Troponin T, High Sensitivity (06.02.20 @ 18:54)    Troponin T, High Sensitivity: 22:   Serum Pro-Brain Natriuretic Peptide (06.02.20 @ 17:42)    Serum Pro-Brain Natriuretic Peptide: 43.21    20:36 - VBG - pH: 7.35  | pCO2: 51    | pO2: 32    | Lactate: 1.0      EKG: RBBB tachycardic    < from: US Duplex Venous Lower Ext Complete, Bilateral (06.02.20 @ 18:26) >    IMPRESSION:   No evidence of deep venous thrombosis in either lower extremity.    < from: CT Angio Chest w/ IV Cont (06.02.20 @ 19:17) >    IMPRESSION:     1.  No central right or left main pulmonary embolism.The lobar, segmental and subsegmental branches are suboptimally opacified.  2.  Bilateral groundglass opacities are of uncertain etiology. Given the reported history, this could be the sequela of the patient's reported infection, however, the findings are not specific.    < end of copied text > 13.6   8.37  )-----------( 354      ( 02 Jun 2020 17:42 )             42.3       06-02    143  |  101  |  22  ----------------------------<  100<H>  3.7   |  26  |  1.13    Ca    10.1      02 Jun 2020 17:42    TPro  7.5  /  Alb  4.4  /  TBili  0.4  /  DBili  x   /  AST  22  /  ALT  13  /  AlkPhos  82  06-02        PT/INR - ( 02 Jun 2020 17:42 )   PT: 11.0 SEC;   INR: 0.97     PTT - ( 02 Jun 2020 17:42 )  PTT:28.0 SEC    Troponin T, High Sensitivity (06.02.20 @ 17:42)    Troponin T, High Sensitivity: 23:   Troponin T, High Sensitivity (06.02.20 @ 18:54)    Troponin T, High Sensitivity: 22:   Serum Pro-Brain Natriuretic Peptide (06.02.20 @ 17:42)    Serum Pro-Brain Natriuretic Peptide: 43.21    20:36 - VBG - pH: 7.35  | pCO2: 51    | pO2: 32    | Lactate: 1.0      EKG: RBBB tachycardic    < from: US Duplex Venous Lower Ext Complete, Bilateral (06.02.20 @ 18:26) >    IMPRESSION:   No evidence of deep venous thrombosis in either lower extremity.    < from: CT Angio Chest w/ IV Cont (06.02.20 @ 19:17) >      IMPRESSION:     1.  No central right or left main pulmonary embolism. The lobar, segmental and subsegmental branches are suboptimally opacified.  2.  Bilateral groundglass opacities are of uncertain etiology. Given the reported history, this could be the sequela of the patient's reported infection, however, the findings are not specific.    < end of copied text >

## 2020-06-03 NOTE — PROGRESS NOTE ADULT - PROBLEM SELECTOR PLAN 1
- The patient has had 2 weeks of dyspnea on exertion with associated chest tightness. EKG concerning for RBBB. Unclear if patient has CAD vs. PE vs. right heart failure secondary to pulmonary HTN.   - CT angio suboptimal to evaluate lobar, segmental and subsegmental branches are suboptimally opacified.   - f/u V/Q scan  - f/u TTE looking for right heart strain or evidence of right hear failure  - could be related to pulmonary fibrosis related to COVID causing pulm HTN.  - would start moderate dose AC with lovenox 40 mg BID until v/q scan done - The patient has 2 months of dyspnea and now with new RBBB on EKG. Negative CTA (incomplete study), LE duplex negative. Could be subsegmental small PE vs pulmonary HTN from ALLEY vs prolonged recovery from COVID19. Ground glass opacities on CTA of unclear significance, however patient non-septic, will monitor off antibiotics.   - CT angio of L and R main negative; suboptimal to evaluate lobar, segmental and subsegmental branches  - f/u TTE looking for right heart strain or evidence of right heart failure  - Lovenox 40 mg daily for now  - check d-dimer  - cardiology consult when echo obtained - The patient has 2 months of dyspnea and now with new RBBB on EKG. Negative CTA (incomplete study), LE duplex negative. Could be subsegmental small PE vs pulmonary HTN from ALLEY vs prolonged recovery from COVID19. Ground glass opacities on CTA of unclear significance, however patient non-septic, will monitor off antibiotics.   - CT angio of L and R main negative; suboptimal to evaluate lobar, segmental and subsegmental branches  - f/u TTE looking for right heart strain or evidence of right heart failure  - Heparin subQ for now  - check d-dimer  - cardiology consult when echo obtained - The patient has 2 months of dyspnea and now with new RBBB on EKG. Negative CTA (incomplete study), LE duplex negative. Could be subsegmental small PE vs pulmonary HTN from ALLEY vs prolonged recovery from COVID19. Ground glass opacities on CTA of unclear significance, however patient non-septic, will monitor off antibiotics.   - CT angio of L and R main negative; suboptimal to evaluate lobar, segmental and subsegmental branches  - f/u TTE looking for right heart strain or evidence of right heart failure  - Heparin subQ for now  - check d-dimer  - cardiology consult when echo obtained  - ALLEY - continue CPAP inpatient

## 2020-06-03 NOTE — H&P ADULT - ASSESSMENT
The patient is a 65 year old woman with a history of DM, CKD and HTN presenting with dyspnea on exertion for about 2 weeks. Given recent covid infection there is concern for PE vs. HF

## 2020-06-03 NOTE — H&P ADULT - PROBLEM SELECTOR PLAN 1
- The patient has had 2 weeks of dyspnea on exertion with associated chest tightness. EKG concerning for RBBB. Unclear if patient has CAD vs. PE.   - CT angio suboptimal to evaluate lobar, segmental and subsegmental branches are suboptimally opacified.   - f/u TTE   - could be related to pulmonary fibrosis related to COVID causing pulm HTN. - The patient has had 2 weeks of dyspnea on exertion with associated chest tightness. EKG concerning for RBBB. Unclear if patient has CAD vs. PE vs. right heart failure secondary to pulmonary HTN.   - CT angio suboptimal to evaluate lobar, segmental and subsegmental branches are suboptimally opacified.   - f/u V/Q scan  - f/u TTE looking for right heart strain or evidence of right hear failure  - could be related to pulmonary fibrosis related to COVID causing pulm HTN.  - would start moderate dose AC with lovenox 40 mg BID until v/q scan done - The patient has had 2 weeks of dyspnea on exertion with associated chest tightness. EKG concerning for RBBB. Unclear if patient has CAD vs. PE vs. right heart failure secondary to pulmonary HTN.   - CT angio suboptimal to evaluate lobar, segmental and subsegmental branches are suboptimally opacified.   - f/u V/Q scan  - f/u TTE looking for right heart strain or evidence of right heart failure  - could be related to pulmonary fibrosis related to COVID causing pulm HTN.  - would start DVT ppx AC with lovenox 40 mg BID until v/q scan done

## 2020-06-03 NOTE — H&P ADULT - PROBLEM SELECTOR PLAN 3
- f/u HbA1C  - continue home lantus at slightly reduced dose, 50 units BID.   - humalog 20 units premeal with sliding scale.

## 2020-06-04 ENCOUNTER — TRANSCRIPTION ENCOUNTER (OUTPATIENT)
Age: 65
End: 2020-06-04

## 2020-06-04 VITALS
RESPIRATION RATE: 18 BRPM | SYSTOLIC BLOOD PRESSURE: 125 MMHG | HEART RATE: 95 BPM | TEMPERATURE: 98 F | DIASTOLIC BLOOD PRESSURE: 67 MMHG | OXYGEN SATURATION: 99 %

## 2020-06-04 LAB
ALBUMIN SERPL ELPH-MCNC: 3.9 G/DL — SIGNIFICANT CHANGE UP (ref 3.3–5)
ALP SERPL-CCNC: 73 U/L — SIGNIFICANT CHANGE UP (ref 40–120)
ALT FLD-CCNC: 11 U/L — SIGNIFICANT CHANGE UP (ref 4–33)
ANION GAP SERPL CALC-SCNC: 14 MMO/L — SIGNIFICANT CHANGE UP (ref 7–14)
AST SERPL-CCNC: 14 U/L — SIGNIFICANT CHANGE UP (ref 4–32)
BASOPHILS # BLD AUTO: 0.05 K/UL — SIGNIFICANT CHANGE UP (ref 0–0.2)
BASOPHILS NFR BLD AUTO: 0.9 % — SIGNIFICANT CHANGE UP (ref 0–2)
BILIRUB SERPL-MCNC: 0.5 MG/DL — SIGNIFICANT CHANGE UP (ref 0.2–1.2)
BUN SERPL-MCNC: 27 MG/DL — HIGH (ref 7–23)
CALCIUM SERPL-MCNC: 9.5 MG/DL — SIGNIFICANT CHANGE UP (ref 8.4–10.5)
CHLORIDE SERPL-SCNC: 103 MMOL/L — SIGNIFICANT CHANGE UP (ref 98–107)
CO2 SERPL-SCNC: 25 MMOL/L — SIGNIFICANT CHANGE UP (ref 22–31)
CREAT SERPL-MCNC: 1.17 MG/DL — SIGNIFICANT CHANGE UP (ref 0.5–1.3)
D DIMER BLD IA.RAPID-MCNC: 506 NG/ML — SIGNIFICANT CHANGE UP
EOSINOPHIL # BLD AUTO: 0.31 K/UL — SIGNIFICANT CHANGE UP (ref 0–0.5)
EOSINOPHIL NFR BLD AUTO: 5.4 % — SIGNIFICANT CHANGE UP (ref 0–6)
GLUCOSE BLDC GLUCOMTR-MCNC: 155 MG/DL — HIGH (ref 70–99)
GLUCOSE SERPL-MCNC: 152 MG/DL — HIGH (ref 70–99)
HCT VFR BLD CALC: 37.6 % — SIGNIFICANT CHANGE UP (ref 34.5–45)
HGB BLD-MCNC: 12.1 G/DL — SIGNIFICANT CHANGE UP (ref 11.5–15.5)
IMM GRANULOCYTES NFR BLD AUTO: 0.2 % — SIGNIFICANT CHANGE UP (ref 0–1.5)
LYMPHOCYTES # BLD AUTO: 3.19 K/UL — SIGNIFICANT CHANGE UP (ref 1–3.3)
LYMPHOCYTES # BLD AUTO: 55.5 % — HIGH (ref 13–44)
MAGNESIUM SERPL-MCNC: 1.9 MG/DL — SIGNIFICANT CHANGE UP (ref 1.6–2.6)
MCHC RBC-ENTMCNC: 29.4 PG — SIGNIFICANT CHANGE UP (ref 27–34)
MCHC RBC-ENTMCNC: 32.2 % — SIGNIFICANT CHANGE UP (ref 32–36)
MCV RBC AUTO: 91.5 FL — SIGNIFICANT CHANGE UP (ref 80–100)
MONOCYTES # BLD AUTO: 0.78 K/UL — SIGNIFICANT CHANGE UP (ref 0–0.9)
MONOCYTES NFR BLD AUTO: 13.6 % — SIGNIFICANT CHANGE UP (ref 2–14)
NEUTROPHILS # BLD AUTO: 1.41 K/UL — LOW (ref 1.8–7.4)
NEUTROPHILS NFR BLD AUTO: 24.4 % — LOW (ref 43–77)
NRBC # FLD: 0 K/UL — SIGNIFICANT CHANGE UP (ref 0–0)
PHOSPHATE SERPL-MCNC: 5.2 MG/DL — HIGH (ref 2.5–4.5)
PLATELET # BLD AUTO: 324 K/UL — SIGNIFICANT CHANGE UP (ref 150–400)
PMV BLD: 10.7 FL — SIGNIFICANT CHANGE UP (ref 7–13)
POTASSIUM SERPL-MCNC: 3.6 MMOL/L — SIGNIFICANT CHANGE UP (ref 3.5–5.3)
POTASSIUM SERPL-SCNC: 3.6 MMOL/L — SIGNIFICANT CHANGE UP (ref 3.5–5.3)
PROT SERPL-MCNC: 6.3 G/DL — SIGNIFICANT CHANGE UP (ref 6–8.3)
RBC # BLD: 4.11 M/UL — SIGNIFICANT CHANGE UP (ref 3.8–5.2)
RBC # FLD: 15.1 % — HIGH (ref 10.3–14.5)
SODIUM SERPL-SCNC: 142 MMOL/L — SIGNIFICANT CHANGE UP (ref 135–145)
WBC # BLD: 5.75 K/UL — SIGNIFICANT CHANGE UP (ref 3.8–10.5)
WBC # FLD AUTO: 5.75 K/UL — SIGNIFICANT CHANGE UP (ref 3.8–10.5)

## 2020-06-04 PROCEDURE — 99239 HOSP IP/OBS DSCHRG MGMT >30: CPT | Mod: GC

## 2020-06-04 RX ADMIN — Medication 600 MILLIGRAM(S): at 12:15

## 2020-06-04 RX ADMIN — Medication 25 MILLIGRAM(S): at 05:32

## 2020-06-04 RX ADMIN — Medication 2: at 08:49

## 2020-06-04 RX ADMIN — Medication 4: at 12:16

## 2020-06-04 RX ADMIN — HEPARIN SODIUM 5000 UNIT(S): 5000 INJECTION INTRAVENOUS; SUBCUTANEOUS at 05:32

## 2020-06-04 RX ADMIN — AMLODIPINE BESYLATE 10 MILLIGRAM(S): 2.5 TABLET ORAL at 12:15

## 2020-06-04 RX ADMIN — LISINOPRIL 40 MILLIGRAM(S): 2.5 TABLET ORAL at 05:32

## 2020-06-04 RX ADMIN — INSULIN GLARGINE 50 UNIT(S): 100 INJECTION, SOLUTION SUBCUTANEOUS at 08:49

## 2020-06-04 RX ADMIN — Medication 10 MILLIGRAM(S): at 12:16

## 2020-06-04 RX ADMIN — Medication 12.5 MILLIGRAM(S): at 05:32

## 2020-06-04 NOTE — DISCHARGE NOTE PROVIDER - HOSPITAL COURSE
HPI:    JODY WORLEY is a 64yo woman with a history of DM2 (requires insulin) complicated by stage 2 CKD, ALLEY, obesity, HLD and HTN presenting to [facility] ED on 6/2 due to 2 weeks of dyspnea on exertion and lower extremity edema, found to have a new RBBB all 2 months after discharge from [facility] for COVID19 pneumonia. Patient had a 5 day hospital stay for COVID pneumonia at [facility], she was discharged on 4/6/2020. Following discharge, she took xeralto for 30 days. Then, 2 weeks ago she started having some dyspnea with light activity. She became short of breath and lightheaded after standing for 10 minutes, preventing her from doing light chores around the house (e.g. standing at the stove). She had no change in orthopnea. She intermittently uses her nasal CPAP machine at home. Patient does have a remote history of angina, starting ~30 years ago, though does not endorse any recent chest pain. She has had palpitations in the past. The last time she had them was in 2018, after which she received a cardiac catheter evaluation, showing clean coronary arteries. She has intermittent lower extremity edema, which was "worse than it had ever been before," immediately prior to admission. She does confirm needing wider shoes over the past few years. Due to this worsening, she called her PCP (Dr. Odalis Grajeda in [place]) who referred her to Cardiology (Estrella Barton in [place] [phone]). She received an EKG, which showed a new RBBB, so she came to the ED.         Brief hospital course:     During her stay, patient endorsed fatigue ever since her prior hospitalization for COVID. While admitted, she denied SOB, chest pain, and palpitations. Patient received an EKG, LE US, and CTA. EKG showed new incomplete RBBB. LE US showed no DVTs. CTA was suboptimal ("segmental and subsegmental branches are suboptimally opacified"), but did show bilateral ground glass opacities and did NOT show central right or left main pulmonary embolism. Patient received a TTE, which showed mild mitral and aortic calcifications, and was otherwise normal with no evidence of right heart strain. D-dimer was 533 on hospital day 1, and then 506 on hospital day 2. She received a consultation with physical therapy, who determined she did not required at home skilled PT.

## 2020-06-04 NOTE — PROGRESS NOTE ADULT - PROBLEM SELECTOR PLAN 7
Transitions of Care Status:  1.  Name of PCP: Odalis Grajeda  2.  PCP Contacted on Admission: [ ] Y    [x ] N  night admission  3.  PCP contacted at Discharge: [ ] Y    [ ] N    [ ] N/A  4.  Post-Discharge Appointment Date and Location:  5.  Summary of Handoff given to PCP:
Transitions of Care Status:  1.  Name of PCP: Odalis Grajeda  2.  PCP Contacted on Admission: [ ] Y    [x ] N  night admission  3.  PCP contacted at Discharge: [ ] Y    [ ] N    [ ] N/A  4.  Post-Discharge Appointment Date and Location:  5.  Summary of Handoff given to PCP:

## 2020-06-04 NOTE — PROGRESS NOTE ADULT - ATTENDING COMMENTS
stable for discharge home today. f/u cardiology and pulmonary as outpt  total time spent on dc 41min

## 2020-06-04 NOTE — DISCHARGE NOTE PROVIDER - NSDCMRMEDTOKEN_GEN_ALL_CORE_FT
amLODIPine 10 mg oral tablet: 1 tab(s) orally once a day  hydroCHLOROthiazide 12.5 mg oral tablet: 1 tab(s) orally once a day  Levemir 100 units/mL subcutaneous solution: 55 unit(s) subcutaneous 2 times a day  metoprolol succinate 25 mg oral tablet, extended release: 1 tab(s) orally once a day  NovoLOG FlexPen 100 units/mL injectable solution: 25 unit(s) injectable 3 times a day (before meals)  oxybutynin 10 mg/24 hr oral tablet, extended release: 1 tab(s) orally once a day  ramipril 10 mg oral capsule: 1 cap(s) orally once a day  rosuvastatin 10 mg oral tablet: 1 tab(s) orally once a day (at bedtime)  Vitamin C 1000 mg oral tablet: 1 tab(s) orally once a day  zinc citrate 50 mg oral capsule: 1 tab(s) orally once a day

## 2020-06-04 NOTE — DISCHARGE NOTE NURSING/CASE MANAGEMENT/SOCIAL WORK - PATIENT PORTAL LINK FT
You can access the FollowMyHealth Patient Portal offered by [facility] by registering at the following website: [email or web address]. By joining [employer]’s FollowMyHealth portal, you will also be able to view your health information using other applications (apps) compatible with our system.

## 2020-06-04 NOTE — DISCHARGE NOTE PROVIDER - NSFOLLOWUPCLINICS_GEN_ALL_ED_FT
[facility]  Pulmonology  [address]  [place] [address]  Phone: [phone]  Fax:   Follow Up Time: 2 weeks    [facility]  Cardiology  [address] [place] [id number]  Phone: [phone]  Fax:   Follow Up Time: 1 week

## 2020-06-04 NOTE — DISCHARGE NOTE PROVIDER - NSDCCPCAREPLAN_GEN_ALL_CORE_FT
PRINCIPAL DISCHARGE DIAGNOSIS  Diagnosis: CARDENAS (dyspnea on exertion)  Assessment and Plan of Treatment:       SECONDARY DISCHARGE DIAGNOSES  Diagnosis: Incomplete right bundle branch block  Assessment and Plan of Treatment: PRINCIPAL DISCHARGE DIAGNOSIS  Diagnosis: CARDENAS (dyspnea on exertion)  Assessment and Plan of Treatment: You came to the hospital [facility] of fatigue and shortness of breath. You underwent workup for a clot in your lungs and legs, which was negative, and you had an ultrasound of your heart, which revealed some calcification of your valves which is of unclear signficance and can be benign. You have a new finding on your EKG, called a right bundle branch block (see info below), which you need to follow up with your cardiologist about. It is possible your fatigue and lack of energy is a prolonged recovery from your COVID19, and you should follow up with your primary care doctor within one week as well. You should also see a pulmonologist if you continue to have shortness of breath because your CT scan of the lung, while it did not show a clot, did show "ground glass opacities" which can be leftover damage from COVID19. Please come back to the hospital if you feel your weakness if worsening or you develop chest pain or fevers. Please use your CPAP machine at night if you are supposed to do this for sleep apnea as this can significantly contribute to fatigue. Also follow up with your pimary care and/or an endocrinologist regarding your diabetes, which is still uncontrolled.      SECONDARY DISCHARGE DIAGNOSES  Diagnosis: Incomplete right bundle branch block  Assessment and Plan of Treatment: You have an abnormal EKG finding known as a right bundle branch block. You had no evidence of a heart attack. This can be a benign finding, and your ultrasound of the heart revealed some calcification of your valves which is of unclear signficance and can be benign. Please follow up with your cardiologist and you primary care doctor regarding further potential workup

## 2020-06-04 NOTE — PROGRESS NOTE ADULT - ASSESSMENT
65F h/o insulin-dependent DM2, stage 3 CKD, obesity, ALLEY (inconsistent CPAP use), angina (negative cath 2018) and HTN along with recent hospitalization from COVID pneumonia (d/c April 2020 s/p Xarelto x 30 days) p/w new RBBB and persistent fatigue/SOB since discharge from COVID admission in early April 2020.

## 2020-06-04 NOTE — MEDICAL STUDENT PROGRESS NOTE(EDUCATION) - SUBJECTIVE AND OBJECTIVE BOX
JODY WORLEY is a 66yo woman with a history of DM2 (requires insulin) complicated by stage 2 CKD, metabolic syndrome and ALLEY presenting to [facility] ED 2 months after discharge from [facility] for COVID19 pneumonia for continued fatigue and RBBB.     Patient initially refused CPAP, but was able to use it for 5 hours. Today, she endorses no subjective changes from yesterday. She denies SOB, chest pain, abdominal pain, changes in bladder/bowel habits, and changes in joint pain.       Vital Signs Last 24 Hrs  T(C): 36.3 (04 Jun 2020 05:30), Max: 36.9 (03 Jun 2020 12:20)  T(F): 97.4 (04 Jun 2020 05:30), Max: 98.4 (03 Jun 2020 12:20)  HR: 89 (04 Jun 2020 05:30) (89 - 105)  BP: 121/65 (04 Jun 2020 05:30) (120/69 - 121/65)  RR: 18 (04 Jun 2020 05:30) (18 - 18)  SpO2: 99% (04 Jun 2020 05:30) (98% - 99%)    PHYSICAL EXAM:  GENERAL: NAD, well-groomed, well-developed  HEENT - NC/AT, moist mucous membranes  NECK: No JVD  CHEST/LUNG: Clear to auscultation bilaterally; No rales, rhonchi, wheezing  HEART: Regular rate and rhythm; No murmurs, rubs, or gallops  ABDOMEN: Soft, Nontender, Nondistended; Bowel sounds present    LABS:  Complete Blood Count + Automated Diff in AM (06.04.20 @ 05:30)    Nucleated RBC #: 0 K/uL    WBC Count: 5.75 K/uL    RBC Count: 4.11 M/uL    Hemoglobin: 12.1 g/dL    Hematocrit: 37.6 %    Mean Cell Volume: 91.5 fL    Mean Cell Hemoglobin: 29.4 pg    Mean Cell Hemoglobin Conc: 32.2 %    Red Cell Distrib Width: 15.1 %    Platelet Count - Automated: 324 K/uL    MPV: 10.7 fl    Auto Neutrophil #: 1.41 K/uL    Auto Lymphocyte #: 3.19 K/uL    Auto Monocyte #: 0.78 K/uL    Auto Eosinophil #: 0.31 K/uL    Auto Basophil #: 0.05 K/uL    Auto Neutrophil %: 24.4 %    Auto Lymphocyte %: 55.5 %    Auto Monocyte %: 13.6 %    Auto Eosinophil %: 5.4 %    Auto Basophil %: 0.9 %    Auto Immature Granulocyte %: 0.2: (Includes meta, myelo and promyelocytes) %    D-Dimer Assay, Quantitative (06.03.20 @ 16:15)    D-Dimer Assay, Quantitative: 533: A result less than 230 ng/mL DDU correlates with the absence  of thrombosis in a patient with low and moderate pre-test  probability of thrombosis.    Note: 1 ng/ml DDU   2 ng/ml FEU  If you have any questions, please contact Hematology Lab at  ext. 3050. ng/mL    D-Dimer Assay, Quantitative (06.04.20 @ 05:30)    D-Dimer Assay, Quantitative: 506: A result less than 230 ng/mL DDU correlates with the absence  of thrombosis in a patient with low and moderate pre-test  probability of thrombosis.    Note: 1 ng/ml DDU   2 ng/ml FEU  If you have any questions, please contact Hematology Lab at  ext. 3050. ng/mL    Comprehensive Metabolic, Mg + Phosphorus (06.04.20 @ 05:30)    Sodium, Serum: 142 mmol/L    Potassium, Serum: 3.6 mmol/L    Chloride, Serum: 103 mmol/L    Carbon Dioxide, Serum: 25 mmol/L    Anion Gap, Serum: 14 mmo/L    Blood Urea Nitrogen, Serum: 27 mg/dL    Creatinine, Serum: 1.17 mg/dL    Glucose, Serum: 152 mg/dL    Calcium, Total Serum: 9.5 mg/dL    Protein Total, Serum: 6.3 g/dL    Albumin, Serum: 3.9 g/dL    Bilirubin Total, Serum: 0.5 mg/dL    Alkaline Phosphatase, Serum: 73 u/L    Aspartate Aminotransferase (AST/SGOT): 14 u/L    Alanine Aminotransferase (ALT/SGPT): 11 u/L    Magnesium, Serum: 1.9 mg/dL    Phosphorus Level, Serum: 5.2 mg/dL    eGFR if Non : 49: The units for eGFR are ml/min/1.73m2 (normalized body  surface area). The eGFR is calculated from a serum  creatinine using the CKD-EPI equation. Other variables  required for calculation are race, age and sex. Among  patients with chronic kidney disease (CKD), the eGFR is  useful in determining the stage of disease according to  KDOQI CKD classification. All eGFR results are reported  numerically with the following interpretation.    GFR  (ml/min/1.73 m2)          W/KIDNEY DAMAGE    W/O KIDNEY DMG  ==========================================================  >= 90.......................Stage 1..............Normal  POCT  Blood Glucose (06.03.20 @ 08:47)    POCT Blood Glucose.: 173 mg/dL    60-89.......................Stage 2...........Decreased GFR  30-59.......................Stage 3..............Stage 3  15-29.......................Stage 4..............Stage 4  < 15........................Stage 5..............Stage 5    Each stage of CKD assumes that the associated GFR level  has been in effect for at least 3 months. Determination of  stages one and two (with eGFR > 59ml/min/m2) requires  estimation of kidney damage for at least 3 months as  defined by structural or functional abnormalities.    Limitations: All estimates of GFR will be less accurate  for patients at extremes of muscle mass (including but  not limited to frail elderly, critically ill, or cancer  patients), those with unusual diets, and those with  conditions associated with reduced secretion or  extrarenal elimination of creatinine. The eGFR equation  is not recommended for use in patients with unstable  creatinine levels. mL/min    eGFR if : 57 mL/min    POCT  Blood Glucose (06.03.20 @ 21:38)    POCT Blood Glucose.: 217: RN Notified Readback mg/dL    POCT  Blood Glucose (06.03.20 @ 17:27)    POCT Blood Glucose.: 213: RN Notified Readback mg/dL    POCT  Blood Glucose (06.03.20 @ 11:50)    POCT Blood Glucose.: 91 mg/dL    RADIOLOGY & ADDITIONAL TESTS:  < from: Transthoracic Echocardiogram (06.03.20 @ 14:27) >  Patient name: JODY WORLEY  YOB: 1955   Age: 65 (F)   MR#: [id number]  Study Date: 6/3/2020  PROCEDURE: Transthoracic echocardiogram with 2-D, M-Mode  and complete spectral and color flow Doppler.  CONCLUSIONS:  1. Mitral annular calcification, otherwise normal mitral  valve.  2. Calcified aortic valve with normal opening.  3. Normal left ventricular internal dimensions and wall  thicknesses.  4. Normal left ventricular systolic function. No segmental  wall motion abnormalities.  5. Normal right ventricular size and function.  < end of copied text >      Meds  MEDICATIONS  (STANDING):  amLODIPine   Tablet 10 milliGRAM(s) Oral daily  atorvastatin 40 milliGRAM(s) Oral at bedtime  dextrose 5%. 1000 milliLiter(s) (50 mL/Hr) IV Continuous <Continuous>  dextrose 50% Injectable 12.5 Gram(s) IV Push once  dextrose 50% Injectable 25 Gram(s) IV Push once  dextrose 50% Injectable 25 Gram(s) IV Push once  heparin   Injectable 5000 Unit(s) SubCutaneous every 8 hours  hydrochlorothiazide 12.5 milliGRAM(s) Oral daily  insulin glargine Injectable (LANTUS) 50 Unit(s) SubCutaneous two times a day  insulin lispro (HumaLOG) corrective regimen sliding scale   SubCutaneous three times a day before meals  insulin lispro (HumaLOG) corrective regimen sliding scale   SubCutaneous at bedtime  lisinopril 40 milliGRAM(s) Oral daily  metoprolol succinate ER 25 milliGRAM(s) Oral daily  oxybutynin XL 10 milliGRAM(s) Oral daily    MEDICATIONS  (PRN):  dextrose 40% Gel 15 Gram(s) Oral once PRN Blood Glucose LESS THAN 70 milliGRAM(s)/deciliter  glucagon  Injectable 1 milliGRAM(s) IntraMuscular once PRN Glucose LESS THAN 70 milligrams/deciliter  guaiFENesin  milliGRAM(s) Oral every 12 hours PRN cough/congestion JODY WORLEY is a 64yo woman with a history of DM2 (requires insulin) complicated by stage 2 CKD, metabolic syndrome and ALLEY presenting to [facility] ED 2 months after discharge from [facility] for COVID19 pneumonia for continued fatigue and RBBB.     Patient initially refused CPAP, but was able to use it for 5 hours. Today, she endorses no subjective changes from yesterday. She denies SOB, chest pain, abdominal pain, changes in bladder/bowel habits, and changes in joint pain.       Vital Signs Last 24 Hrs  T(C): 36.3 (04 Jun 2020 05:30), Max: 36.9 (03 Jun 2020 12:20)  T(F): 97.4 (04 Jun 2020 05:30), Max: 98.4 (03 Jun 2020 12:20)  HR: 89 (04 Jun 2020 05:30) (89 - 105)  BP: 121/65 (04 Jun 2020 05:30) (120/69 - 121/65)  RR: 18 (04 Jun 2020 05:30) (18 - 18)  SpO2: 99% (04 Jun 2020 05:30) (98% - 99%)    PHYSICAL EXAM:  GENERAL: NAD, well-groomed, well-developed  HEENT - NC/AT, moist mucous membranes  NECK: No JVD  CHEST/LUNG: Clear to auscultation bilaterally; No rales, rhonchi, wheezing  HEART: Regular rate and rhythm; No murmurs, rubs, or gallops  ABDOMEN: Soft, Nontender, Nondistended; Bowel sounds present    LABS:  Complete Blood Count + Automated Diff in AM (06.04.20 @ 05:30)    Nucleated RBC #: 0 K/uL    WBC Count: 5.75 K/uL    RBC Count: 4.11 M/uL    Hemoglobin: 12.1 g/dL    Hematocrit: 37.6 %    Mean Cell Volume: 91.5 fL    Mean Cell Hemoglobin: 29.4 pg    Mean Cell Hemoglobin Conc: 32.2 %    Red Cell Distrib Width: 15.1 %    Platelet Count - Automated: 324 K/uL    MPV: 10.7 fl    Auto Neutrophil #: 1.41 K/uL    Auto Lymphocyte #: 3.19 K/uL    Auto Monocyte #: 0.78 K/uL    Auto Eosinophil #: 0.31 K/uL    Auto Basophil #: 0.05 K/uL    Auto Neutrophil %: 24.4 %    Auto Lymphocyte %: 55.5 %    Auto Monocyte %: 13.6 %    Auto Eosinophil %: 5.4 %    Auto Basophil %: 0.9 %    Auto Immature Granulocyte %: 0.2: (Includes meta, myelo and promyelocytes) %    D-Dimer Assay, Quantitative (06.03.20 @ 16:15)    D-Dimer Assay, Quantitative: 533   D-Dimer Assay, Quantitative (06.04.20 @ 05:30)    D-Dimer Assay, Quantitative: 506    Comprehensive Metabolic, Mg + Phosphorus (06.04.20 @ 05:30)    Sodium, Serum: 142 mmol/L    Potassium, Serum: 3.6 mmol/L    Chloride, Serum: 103 mmol/L    Carbon Dioxide, Serum: 25 mmol/L    Anion Gap, Serum: 14 mmo/L    Blood Urea Nitrogen, Serum: 27 mg/dL    Creatinine, Serum: 1.17 mg/dL    Glucose, Serum: 152 mg/dL    Calcium, Total Serum: 9.5 mg/dL    Protein Total, Serum: 6.3 g/dL    Albumin, Serum: 3.9 g/dL    Bilirubin Total, Serum: 0.5 mg/dL    Alkaline Phosphatase, Serum: 73 u/L    Aspartate Aminotransferase (AST/SGOT): 14 u/L    Alanine Aminotransferase (ALT/SGPT): 11 u/L    Magnesium, Serum: 1.9 mg/dL    Phosphorus Level, Serum: 5.2 mg/dL   eGFR if : 57 mL/min    POCT  Blood Glucose (06.03.20 @ 21:38)    POCT Blood Glucose.: 217  POCT  Blood Glucose (06.03.20 @ 17:27)    POCT Blood Glucose.: 213  POCT  Blood Glucose (06.03.20 @ 11:50)    POCT Blood Glucose.: 91 mg/dL    RADIOLOGY & ADDITIONAL TESTS:  < from: Transthoracic Echocardiogram (06.03.20 @ 14:27) >  Patient name: JODY WORLEY  YOB: 1955   Age: 65 (F)   MR#: [id number]  Study Date: 6/3/2020  PROCEDURE: Transthoracic echocardiogram with 2-D, M-Mode  and complete spectral and color flow Doppler.  CONCLUSIONS:  1. Mitral annular calcification, otherwise normal mitral  valve.  2. Calcified aortic valve with normal opening.  3. Normal left ventricular internal dimensions and wall  thicknesses.  4. Normal left ventricular systolic function. No segmental  wall motion abnormalities.  5. Normal right ventricular size and function.  < end of copied text >      Meds  MEDICATIONS  (STANDING):  amLODIPine   Tablet 10 milliGRAM(s) Oral daily  atorvastatin 40 milliGRAM(s) Oral at bedtime  dextrose 5%. 1000 milliLiter(s) (50 mL/Hr) IV Continuous <Continuous>  dextrose 50% Injectable 12.5 Gram(s) IV Push once  dextrose 50% Injectable 25 Gram(s) IV Push once  dextrose 50% Injectable 25 Gram(s) IV Push once  heparin   Injectable 5000 Unit(s) SubCutaneous every 8 hours  hydrochlorothiazide 12.5 milliGRAM(s) Oral daily  insulin glargine Injectable (LANTUS) 50 Unit(s) SubCutaneous two times a day  insulin lispro (HumaLOG) corrective regimen sliding scale   SubCutaneous three times a day before meals  insulin lispro (HumaLOG) corrective regimen sliding scale   SubCutaneous at bedtime  lisinopril 40 milliGRAM(s) Oral daily  metoprolol succinate ER 25 milliGRAM(s) Oral daily  oxybutynin XL 10 milliGRAM(s) Oral daily    MEDICATIONS  (PRN):  dextrose 40% Gel 15 Gram(s) Oral once PRN Blood Glucose LESS THAN 70 milliGRAM(s)/deciliter  glucagon  Injectable 1 milliGRAM(s) IntraMuscular once PRN Glucose LESS THAN 70 milligrams/deciliter  guaiFENesin  milliGRAM(s) Oral every 12 hours PRN cough/congestion

## 2020-06-04 NOTE — DISCHARGE NOTE PROVIDER - CARE PROVIDER_API CALL
Odalis Grajeda  [facility]  [address] [place] [address]  Phone: [phone]  Fax: [phone]  Established Patient  Follow Up Time: 1 week

## 2020-06-04 NOTE — PROGRESS NOTE ADULT - PROBLEM SELECTOR PLAN 2
- patient says her LE edema is waxing/waning, currently worse but has previously been to this level and recedes with time.  - more likely amlodipine induced with no JVD and clear lungs  - TTE wnl

## 2020-06-04 NOTE — PROGRESS NOTE ADULT - SUBJECTIVE AND OBJECTIVE BOX
Abhinav Kebede MD  Internal Medicine Resident  [phone]    PATIENT:  JODY WORLEY  [id number]    CHIEF COMPLAINT:  Patient is a 65y old  Female who presents with a chief complaint of Dyspnea in the setting of new RBBB (04 Jun 2020 11:35)      INTERVAL HISTORY/OVERNIGHT EVENTS:  JODY WORLEY is a 64yo woman with a history of DM2 (requires insulin) complicated by stage 2 CKD, metabolic syndrome and ALLEY presenting to [facility] ED 2 months after discharge from [facility] for COVID19 pneumonia for continued fatigue and RBBB.     Patient initially refused CPAP, but was able to use it for 5 hours. Today, she endorses no subjective changes from yesterday. She denies SOB, chest pain, abdominal pain, changes in bladder/bowel habits, and changes in joint pain.     MEDICATIONS:  MEDICATIONS  (STANDING):  amLODIPine   Tablet 10 milliGRAM(s) Oral daily  atorvastatin 40 milliGRAM(s) Oral at bedtime  dextrose 5%. 1000 milliLiter(s) (50 mL/Hr) IV Continuous <Continuous>  dextrose 50% Injectable 12.5 Gram(s) IV Push once  dextrose 50% Injectable 25 Gram(s) IV Push once  dextrose 50% Injectable 25 Gram(s) IV Push once  heparin   Injectable 5000 Unit(s) SubCutaneous every 8 hours  hydrochlorothiazide 12.5 milliGRAM(s) Oral daily  insulin glargine Injectable (LANTUS) 50 Unit(s) SubCutaneous two times a day  insulin lispro (HumaLOG) corrective regimen sliding scale   SubCutaneous three times a day before meals  insulin lispro (HumaLOG) corrective regimen sliding scale   SubCutaneous at bedtime  lisinopril 40 milliGRAM(s) Oral daily  metoprolol succinate ER 25 milliGRAM(s) Oral daily  oxybutynin XL 10 milliGRAM(s) Oral daily    MEDICATIONS  (PRN):  dextrose 40% Gel 15 Gram(s) Oral once PRN Blood Glucose LESS THAN 70 milliGRAM(s)/deciliter  glucagon  Injectable 1 milliGRAM(s) IntraMuscular once PRN Glucose LESS THAN 70 milligrams/deciliter  guaiFENesin  milliGRAM(s) Oral every 12 hours PRN cough/congestion      ALLERGIES:  Allergies  No Known Allergies    OBJECTIVE:  ICU Vital Signs Last 24 Hrs  T(C): 36.3 (04 Jun 2020 05:30), Max: 36.9 (03 Jun 2020 12:20)  T(F): 97.4 (04 Jun 2020 05:30), Max: 98.4 (03 Jun 2020 12:20)  HR: 89 (04 Jun 2020 05:30) (89 - 105)  BP: 121/65 (04 Jun 2020 05:30) (120/69 - 121/65)  RR: 18 (04 Jun 2020 05:30) (18 - 18)  SpO2: 99% (04 Jun 2020 05:30) (98% - 99%)    POCT Blood Glucose.: 211 mg/dL (04 Jun 2020 11:37)  POCT Blood Glucose.: 155 mg/dL (04 Jun 2020 08:42)  POCT Blood Glucose.: 217 mg/dL (03 Jun 2020 21:38)  POCT Blood Glucose.: 213 mg/dL (03 Jun 2020 17:27)  POCT Blood Glucose.: 211 mg/dL (04 Jun 2020 11:37)    PHYSICAL EXAMINATION:  GENERAL: NAD, well-groomed, well-developed  HEENT - NC/AT, moist mucous membranes  NECK: No JVD  CHEST/LUNG: Clear to auscultation bilaterally; No rales, rhonchi, wheezing  HEART: Regular rate and rhythm; No murmurs, rubs, or gallops  ABDOMEN: Soft, Nontender, Nondistended; Bowel sounds present      LABS:                          12.1   5.75  )-----------( 324      ( 04 Jun 2020 05:30 )             37.6     06-04    142  |  103  |  27<H>  ----------------------------<  152<H>  3.6   |  25  |  1.17    Ca    9.5      04 Jun 2020 05:30  Phos  5.2     06-04  Mg     1.9     06-04    TPro  6.3  /  Alb  3.9  /  TBili  0.5  /  DBili  x   /  AST  14  /  ALT  11  /  AlkPhos  73  06-04    LIVER FUNCTIONS - ( 04 Jun 2020 05:30 )  Alb: 3.9 g/dL / Pro: 6.3 g/dL / ALK PHOS: 73 u/L / ALT: 11 u/L / AST: 14 u/L / GGT: x           PT/INR - ( 02 Jun 2020 17:42 )   PT: 11.0 SEC;   INR: 0.97     PTT - ( 02 Jun 2020 17:42 )  PTT:28.0 SEC

## 2020-06-04 NOTE — PROGRESS NOTE ADULT - PROBLEM SELECTOR PLAN 3
- Hb A1c 9.5%  - continue home Lantus at slightly reduced dose, 50 units BID.   - SSI - d/c standing premeal as FS are ok with Lantus + SSI  - monitor FS

## 2020-06-04 NOTE — MEDICAL STUDENT PROGRESS NOTE(EDUCATION) - NS MD HP STUD ASPLAN PLAN FT
Problem     Problem 1: Fatigue/subjective SOB  - Most likely due to deconditioning 2/2 recent hospitalization from COVID 19 infection  - Echo was reassuring and likely rules out cardiac causes of SOB  - CTA showed no PE in right and left mainstem pulmonary vessels. D-dimer was 506 (down from 533 yesterday). Tachycardia is a chronic condition. PE seems unlikely.     Problem 2: RBBB  - Echo showed no right heart strain.  - Follow-up outpatient cardiology.     Problem 3: DM2 (insulin requiring) complicated by stage 2 CKD  - home doses: 55U BID Lantus, 25U premeal humalog  - In hospital, 50U BID Lantus, received 1 dose 20U premeal --> glucose of 91 --> d/c premeal insulin and then glucose 200-249. Therefore, given her Insulin resident status, she would require 4 U more insulin/day.     Problem 4: Essential HTN  - Continue home medications: amlodipine, HCTZ, lisinopril, and metoprolol.    Problem 5: Hyperlipidemia  - Continue home atorvastatin.    Problem 6: Prophylaxis  - DVT ppx: heparin    Discharge planning issues: Discharge planning issues  - d/c home today  - No need for skilled PT  - follow-up with PCP within the week

## 2020-06-04 NOTE — PROGRESS NOTE ADULT - PROBLEM SELECTOR PLAN 1
The patient has 2 months of dyspnea and now with new RBBB on EKG. Negative CTA (incomplete study), LE duplex negative. Could be subsegmental small PE vs pulmonary HTN from ALLEY vs prolonged recovery from COVID19. Ground glass opacities on CTA of unclear significance, however patient non-septic, will monitor off antibiotics.   - CT angio of L and R main negative; suboptimal to evaluate lobar, segmental and subsegmental branches  - LE duplex negative for clot  - TTE 6/3/2020 normal  - d-dimer stable in 600s - mild elevation of unclear significance in setting of known past COVID19 now s/p 30 days Xarelto - low suspicion for clot given negative above workup  - ALLEY - continue CPAP inpatient  - will need outpatient follow up with cardiology, pulmonology, and primary care

## 2020-06-04 NOTE — MEDICAL STUDENT PROGRESS NOTE(EDUCATION) - NS MD HP STUD ASPLAN ASSES FT
Patient is a 64yo woman with a history of DM2 (requiring insulin) complicated by Stage 2 CKD, metabolic syndrome and ALLEY presenting to [facility] 2 months after discharge after COVID19 infection with continued fatigue likely from deconditioning following COVID19 infection and RBBB most likely from R heart strain 2/2 pulmonary HTN from ALLEY. Patient is a 66yo woman with a history of DM2 (requiring insulin) complicated by Stage 2 CKD, metabolic syndrome and ALLEY presenting to [facility] 2 months after discharge after COVID19 infection with continued fatigue likely from deconditioning following COVID19 infection and RBBB.

## 2020-09-08 ENCOUNTER — APPOINTMENT (OUTPATIENT)
Dept: UROLOGY | Facility: CLINIC | Age: 65
End: 2020-09-08
Payer: MEDICARE

## 2020-09-08 VITALS — TEMPERATURE: 97.5 F

## 2020-09-08 PROCEDURE — 99213 OFFICE O/P EST LOW 20 MIN: CPT

## 2020-09-13 LAB
APPEARANCE: ABNORMAL
BACTERIA UR CULT: ABNORMAL
BACTERIA: ABNORMAL
BILIRUBIN URINE: NEGATIVE
BLOOD URINE: NEGATIVE
COLOR: YELLOW
GLUCOSE QUALITATIVE U: ABNORMAL
HYALINE CASTS: 3 /LPF
KETONES URINE: NEGATIVE
LEUKOCYTE ESTERASE URINE: ABNORMAL
MICROSCOPIC-UA: NORMAL
NITRITE URINE: NEGATIVE
PH URINE: 5.5
PROTEIN URINE: ABNORMAL
RED BLOOD CELLS URINE: 4 /HPF
SPECIFIC GRAVITY URINE: 1.02
SQUAMOUS EPITHELIAL CELLS: 1 /HPF
UROBILINOGEN URINE: NORMAL
WHITE BLOOD CELLS URINE: 42 /HPF

## 2020-09-28 ENCOUNTER — TRANSCRIPTION ENCOUNTER (OUTPATIENT)
Age: 65
End: 2020-09-28

## 2021-03-18 ENCOUNTER — APPOINTMENT (OUTPATIENT)
Dept: UROLOGY | Facility: CLINIC | Age: 66
End: 2021-03-18

## 2021-04-02 ENCOUNTER — APPOINTMENT (OUTPATIENT)
Dept: CV DIAGNOSITCS | Facility: HOSPITAL | Age: 66
End: 2021-04-02
Payer: MEDICARE

## 2021-04-02 ENCOUNTER — OUTPATIENT (OUTPATIENT)
Dept: OUTPATIENT SERVICES | Facility: HOSPITAL | Age: 66
LOS: 1 days | End: 2021-04-02

## 2021-04-02 DIAGNOSIS — Z98.82 BREAST IMPLANT STATUS: Chronic | ICD-10-CM

## 2021-04-02 DIAGNOSIS — R07.9 CHEST PAIN, UNSPECIFIED: ICD-10-CM

## 2021-04-02 DIAGNOSIS — I10 ESSENTIAL (PRIMARY) HYPERTENSION: ICD-10-CM

## 2021-04-02 DIAGNOSIS — D25.9 LEIOMYOMA OF UTERUS, UNSPECIFIED: Chronic | ICD-10-CM

## 2021-04-02 PROCEDURE — 93306 TTE W/DOPPLER COMPLETE: CPT | Mod: 26

## 2021-06-25 RX ORDER — CIPROFLOXACIN HYDROCHLORIDE 500 MG/1
500 TABLET, FILM COATED ORAL
Qty: 20 | Refills: 0 | Status: COMPLETED | COMMUNITY
Start: 2020-09-28 | End: 2021-06-25

## 2021-06-25 RX ORDER — CEFPODOXIME PROXETIL 100 MG/1
100 TABLET, FILM COATED ORAL
Qty: 10 | Refills: 0 | Status: COMPLETED | COMMUNITY
Start: 2020-09-08 | End: 2021-06-25

## 2021-06-25 RX ORDER — AMOXICILLIN AND CLAVULANATE POTASSIUM 875; 125 MG/1; MG/1
875-125 TABLET, COATED ORAL TWICE DAILY
Qty: 6 | Refills: 0 | Status: COMPLETED | COMMUNITY
Start: 2020-09-13 | End: 2021-06-25

## 2021-06-29 LAB — BACTERIA UR CULT: NORMAL

## 2021-07-13 ENCOUNTER — APPOINTMENT (OUTPATIENT)
Dept: UROLOGY | Facility: CLINIC | Age: 66
End: 2021-07-13
Payer: MEDICARE

## 2021-07-13 DIAGNOSIS — R39.9 UNSPECIFIED SYMPTOMS AND SIGNS INVOLVING THE GENITOURINARY SYSTEM: ICD-10-CM

## 2021-07-13 DIAGNOSIS — E11.9 TYPE 2 DIABETES MELLITUS W/OUT COMPLICATIONS: ICD-10-CM

## 2021-07-13 PROCEDURE — 99072 ADDL SUPL MATRL&STAF TM PHE: CPT

## 2021-07-13 PROCEDURE — 99214 OFFICE O/P EST MOD 30 MIN: CPT

## 2021-07-13 RX ORDER — ESTRADIOL 0.1 MG/G
0.1 CREAM VAGINAL
Qty: 1 | Refills: 5 | Status: ACTIVE | COMMUNITY
Start: 2021-07-13 | End: 1900-01-01

## 2021-07-14 PROBLEM — E11.9 DIABETES MELLITUS TYPE 2, CONTROLLED: Status: ACTIVE | Noted: 2020-03-19

## 2021-07-14 LAB
APPEARANCE: CLEAR
BACTERIA UR CULT: NORMAL
BACTERIA: NEGATIVE
BILIRUBIN URINE: NEGATIVE
BLOOD URINE: NEGATIVE
COLOR: ABNORMAL
GLUCOSE QUALITATIVE U: ABNORMAL
HYALINE CASTS: 2 /LPF
KETONES URINE: NEGATIVE
LEUKOCYTE ESTERASE URINE: NEGATIVE
MICROSCOPIC-UA: NORMAL
NITRITE URINE: NEGATIVE
PH URINE: 6
PROTEIN URINE: ABNORMAL
RED BLOOD CELLS URINE: 1 /HPF
SPECIFIC GRAVITY URINE: 1.03
SQUAMOUS EPITHELIAL CELLS: 3 /HPF
UROBILINOGEN URINE: NORMAL
WHITE BLOOD CELLS URINE: 3 /HPF

## 2021-08-13 ENCOUNTER — TRANSCRIPTION ENCOUNTER (OUTPATIENT)
Age: 66
End: 2021-08-13

## 2021-08-13 RX ORDER — OXYBUTYNIN CHLORIDE 10 MG/1
10 TABLET, EXTENDED RELEASE ORAL DAILY
Qty: 90 | Refills: 3 | Status: ACTIVE | COMMUNITY
Start: 2020-03-12 | End: 1900-01-01

## 2021-09-20 ENCOUNTER — INPATIENT (INPATIENT)
Facility: HOSPITAL | Age: 66
LOS: 4 days | Discharge: HOME | End: 2021-09-25
Attending: HOSPITALIST | Admitting: HOSPITALIST
Payer: MEDICARE

## 2021-09-20 VITALS
OXYGEN SATURATION: 100 % | SYSTOLIC BLOOD PRESSURE: 149 MMHG | HEIGHT: 65 IN | TEMPERATURE: 97 F | DIASTOLIC BLOOD PRESSURE: 97 MMHG | RESPIRATION RATE: 16 BRPM | HEART RATE: 97 BPM

## 2021-09-20 DIAGNOSIS — R60.0 LOCALIZED EDEMA: ICD-10-CM

## 2021-09-20 DIAGNOSIS — R94.31 ABNORMAL ELECTROCARDIOGRAM [ECG] [EKG]: ICD-10-CM

## 2021-09-20 DIAGNOSIS — I10 ESSENTIAL (PRIMARY) HYPERTENSION: ICD-10-CM

## 2021-09-20 DIAGNOSIS — Z02.9 ENCOUNTER FOR ADMINISTRATIVE EXAMINATIONS, UNSPECIFIED: ICD-10-CM

## 2021-09-20 DIAGNOSIS — G47.33 OBSTRUCTIVE SLEEP APNEA (ADULT) (PEDIATRIC): ICD-10-CM

## 2021-09-20 DIAGNOSIS — I21.4 NON-ST ELEVATION (NSTEMI) MYOCARDIAL INFARCTION: ICD-10-CM

## 2021-09-20 DIAGNOSIS — E87.6 HYPOKALEMIA: ICD-10-CM

## 2021-09-20 DIAGNOSIS — Z98.82 BREAST IMPLANT STATUS: Chronic | ICD-10-CM

## 2021-09-20 DIAGNOSIS — R82.90 UNSPECIFIED ABNORMAL FINDINGS IN URINE: ICD-10-CM

## 2021-09-20 DIAGNOSIS — E11.65 TYPE 2 DIABETES MELLITUS WITH HYPERGLYCEMIA: ICD-10-CM

## 2021-09-20 DIAGNOSIS — Z29.9 ENCOUNTER FOR PROPHYLACTIC MEASURES, UNSPECIFIED: ICD-10-CM

## 2021-09-20 DIAGNOSIS — D25.9 LEIOMYOMA OF UTERUS, UNSPECIFIED: Chronic | ICD-10-CM

## 2021-09-20 LAB
ALBUMIN SERPL ELPH-MCNC: 4.4 G/DL — SIGNIFICANT CHANGE UP (ref 3.3–5)
ALP SERPL-CCNC: 94 U/L — SIGNIFICANT CHANGE UP (ref 40–120)
ALT FLD-CCNC: 20 U/L — SIGNIFICANT CHANGE UP (ref 4–33)
ANION GAP SERPL CALC-SCNC: 13 MMOL/L — SIGNIFICANT CHANGE UP (ref 7–14)
APPEARANCE UR: ABNORMAL
APTT BLD: 20.2 SEC — LOW (ref 27–36.3)
AST SERPL-CCNC: 32 U/L — SIGNIFICANT CHANGE UP (ref 4–32)
BACTERIA # UR AUTO: ABNORMAL
BASOPHILS # BLD AUTO: 0.05 K/UL — SIGNIFICANT CHANGE UP (ref 0–0.2)
BASOPHILS NFR BLD AUTO: 0.7 % — SIGNIFICANT CHANGE UP (ref 0–2)
BILIRUB SERPL-MCNC: 0.6 MG/DL — SIGNIFICANT CHANGE UP (ref 0.2–1.2)
BILIRUB UR-MCNC: NEGATIVE — SIGNIFICANT CHANGE UP
BUN SERPL-MCNC: 26 MG/DL — HIGH (ref 7–23)
CALCIUM SERPL-MCNC: 10.5 MG/DL — SIGNIFICANT CHANGE UP (ref 8.4–10.5)
CHLORIDE SERPL-SCNC: 98 MMOL/L — SIGNIFICANT CHANGE UP (ref 98–107)
CK MB CFR SERPL CALC: 11.1 NG/ML — HIGH
CO2 SERPL-SCNC: 33 MMOL/L — HIGH (ref 22–31)
COLOR SPEC: SIGNIFICANT CHANGE UP
CREAT SERPL-MCNC: 1.2 MG/DL — SIGNIFICANT CHANGE UP (ref 0.5–1.3)
D DIMER BLD IA.RAPID-MCNC: 1771 NG/ML DDU — HIGH
DIFF PNL FLD: NEGATIVE — SIGNIFICANT CHANGE UP
EOSINOPHIL # BLD AUTO: 0.23 K/UL — SIGNIFICANT CHANGE UP (ref 0–0.5)
EOSINOPHIL NFR BLD AUTO: 3.1 % — SIGNIFICANT CHANGE UP (ref 0–6)
EPI CELLS # UR: 0 /HPF — SIGNIFICANT CHANGE UP (ref 0–5)
GLUCOSE BLDC GLUCOMTR-MCNC: 215 MG/DL — HIGH (ref 70–99)
GLUCOSE SERPL-MCNC: 220 MG/DL — HIGH (ref 70–99)
GLUCOSE UR QL: NEGATIVE — SIGNIFICANT CHANGE UP
HCT VFR BLD CALC: 43.2 % — SIGNIFICANT CHANGE UP (ref 34.5–45)
HGB BLD-MCNC: 14.2 G/DL — SIGNIFICANT CHANGE UP (ref 11.5–15.5)
HYALINE CASTS # UR AUTO: 1 /LPF — SIGNIFICANT CHANGE UP (ref 0–7)
IANC: 3.19 K/UL — SIGNIFICANT CHANGE UP (ref 1.5–8.5)
IMM GRANULOCYTES NFR BLD AUTO: 0.1 % — SIGNIFICANT CHANGE UP (ref 0–1.5)
KETONES UR-MCNC: NEGATIVE — SIGNIFICANT CHANGE UP
LEUKOCYTE ESTERASE UR-ACNC: ABNORMAL
LYMPHOCYTES # BLD AUTO: 3.06 K/UL — SIGNIFICANT CHANGE UP (ref 1–3.3)
LYMPHOCYTES # BLD AUTO: 41.7 % — SIGNIFICANT CHANGE UP (ref 13–44)
MAGNESIUM SERPL-MCNC: 2 MG/DL — SIGNIFICANT CHANGE UP (ref 1.6–2.6)
MCHC RBC-ENTMCNC: 29.8 PG — SIGNIFICANT CHANGE UP (ref 27–34)
MCHC RBC-ENTMCNC: 32.9 GM/DL — SIGNIFICANT CHANGE UP (ref 32–36)
MCV RBC AUTO: 90.6 FL — SIGNIFICANT CHANGE UP (ref 80–100)
MONOCYTES # BLD AUTO: 0.8 K/UL — SIGNIFICANT CHANGE UP (ref 0–0.9)
MONOCYTES NFR BLD AUTO: 10.9 % — SIGNIFICANT CHANGE UP (ref 2–14)
NEUTROPHILS # BLD AUTO: 3.19 K/UL — SIGNIFICANT CHANGE UP (ref 1.8–7.4)
NEUTROPHILS NFR BLD AUTO: 43.5 % — SIGNIFICANT CHANGE UP (ref 43–77)
NITRITE UR-MCNC: NEGATIVE — SIGNIFICANT CHANGE UP
NRBC # BLD: 0 /100 WBCS — SIGNIFICANT CHANGE UP
NRBC # FLD: 0 K/UL — SIGNIFICANT CHANGE UP
NT-PROBNP SERPL-SCNC: 72 PG/ML — SIGNIFICANT CHANGE UP
PH UR: 6.5 — SIGNIFICANT CHANGE UP (ref 5–8)
PLATELET # BLD AUTO: 279 K/UL — SIGNIFICANT CHANGE UP (ref 150–400)
POTASSIUM SERPL-MCNC: 3.3 MMOL/L — LOW (ref 3.5–5.3)
POTASSIUM SERPL-MCNC: 3.7 MMOL/L — SIGNIFICANT CHANGE UP (ref 3.5–5.3)
POTASSIUM SERPL-SCNC: 3.3 MMOL/L — LOW (ref 3.5–5.3)
POTASSIUM SERPL-SCNC: 3.7 MMOL/L — SIGNIFICANT CHANGE UP (ref 3.5–5.3)
PROT SERPL-MCNC: 7.8 G/DL — SIGNIFICANT CHANGE UP (ref 6–8.3)
PROT UR-MCNC: ABNORMAL
RBC # BLD: 4.77 M/UL — SIGNIFICANT CHANGE UP (ref 3.8–5.2)
RBC # FLD: 13.6 % — SIGNIFICANT CHANGE UP (ref 10.3–14.5)
RBC CASTS # UR COMP ASSIST: 1 /HPF — SIGNIFICANT CHANGE UP (ref 0–4)
SARS-COV-2 RNA SPEC QL NAA+PROBE: SIGNIFICANT CHANGE UP
SODIUM SERPL-SCNC: 144 MMOL/L — SIGNIFICANT CHANGE UP (ref 135–145)
SP GR SPEC: 1.02 — SIGNIFICANT CHANGE UP (ref 1–1.05)
TROPONIN T, HIGH SENSITIVITY RESULT: 54 NG/L — CRITICAL HIGH
TROPONIN T, HIGH SENSITIVITY RESULT: 82 NG/L — CRITICAL HIGH
UROBILINOGEN FLD QL: SIGNIFICANT CHANGE UP
WBC # BLD: 7.34 K/UL — SIGNIFICANT CHANGE UP (ref 3.8–10.5)
WBC # FLD AUTO: 7.34 K/UL — SIGNIFICANT CHANGE UP (ref 3.8–10.5)
WBC UR QL: 20 /HPF — HIGH (ref 0–5)

## 2021-09-20 PROCEDURE — 71275 CT ANGIOGRAPHY CHEST: CPT | Mod: 26

## 2021-09-20 PROCEDURE — 93010 ELECTROCARDIOGRAM REPORT: CPT

## 2021-09-20 PROCEDURE — 99291 CRITICAL CARE FIRST HOUR: CPT | Mod: 25

## 2021-09-20 PROCEDURE — 99223 1ST HOSP IP/OBS HIGH 75: CPT

## 2021-09-20 PROCEDURE — 71046 X-RAY EXAM CHEST 2 VIEWS: CPT | Mod: 26

## 2021-09-20 RX ORDER — DEXTROSE 50 % IN WATER 50 %
15 SYRINGE (ML) INTRAVENOUS ONCE
Refills: 0 | Status: DISCONTINUED | OUTPATIENT
Start: 2021-09-20 | End: 2021-09-25

## 2021-09-20 RX ORDER — DEXTROSE 50 % IN WATER 50 %
25 SYRINGE (ML) INTRAVENOUS ONCE
Refills: 0 | Status: DISCONTINUED | OUTPATIENT
Start: 2021-09-20 | End: 2021-09-25

## 2021-09-20 RX ORDER — INSULIN LISPRO 100/ML
15 VIAL (ML) SUBCUTANEOUS
Refills: 0 | Status: DISCONTINUED | OUTPATIENT
Start: 2021-09-20 | End: 2021-09-21

## 2021-09-20 RX ORDER — SODIUM CHLORIDE 9 MG/ML
3 INJECTION INTRAMUSCULAR; INTRAVENOUS; SUBCUTANEOUS EVERY 8 HOURS
Refills: 0 | Status: DISCONTINUED | OUTPATIENT
Start: 2021-09-20 | End: 2021-09-25

## 2021-09-20 RX ORDER — TICAGRELOR 90 MG/1
180 TABLET ORAL ONCE
Refills: 0 | Status: COMPLETED | OUTPATIENT
Start: 2021-09-20 | End: 2021-09-20

## 2021-09-20 RX ORDER — ASPIRIN/CALCIUM CARB/MAGNESIUM 324 MG
81 TABLET ORAL DAILY
Refills: 0 | Status: DISCONTINUED | OUTPATIENT
Start: 2021-09-21 | End: 2021-09-25

## 2021-09-20 RX ORDER — INSULIN LISPRO 100/ML
VIAL (ML) SUBCUTANEOUS
Refills: 0 | Status: DISCONTINUED | OUTPATIENT
Start: 2021-09-20 | End: 2021-09-21

## 2021-09-20 RX ORDER — AMLODIPINE BESYLATE 2.5 MG/1
1 TABLET ORAL
Qty: 0 | Refills: 0 | DISCHARGE

## 2021-09-20 RX ORDER — ASPIRIN/CALCIUM CARB/MAGNESIUM 324 MG
324 TABLET ORAL ONCE
Refills: 0 | Status: DISCONTINUED | OUTPATIENT
Start: 2021-09-20 | End: 2021-09-20

## 2021-09-20 RX ORDER — ASCORBIC ACID 60 MG
1 TABLET,CHEWABLE ORAL
Qty: 0 | Refills: 0 | DISCHARGE

## 2021-09-20 RX ORDER — ATORVASTATIN CALCIUM 80 MG/1
80 TABLET, FILM COATED ORAL AT BEDTIME
Refills: 0 | Status: DISCONTINUED | OUTPATIENT
Start: 2021-09-20 | End: 2021-09-25

## 2021-09-20 RX ORDER — INSULIN DETEMIR 100/ML (3)
35 INSULIN PEN (ML) SUBCUTANEOUS EVERY 12 HOURS
Refills: 0 | Status: DISCONTINUED | OUTPATIENT
Start: 2021-09-20 | End: 2021-09-25

## 2021-09-20 RX ORDER — INSULIN DETEMIR 100/ML (3)
55 INSULIN PEN (ML) SUBCUTANEOUS
Qty: 0 | Refills: 0 | DISCHARGE

## 2021-09-20 RX ORDER — SODIUM CHLORIDE 9 MG/ML
1000 INJECTION, SOLUTION INTRAVENOUS
Refills: 0 | Status: DISCONTINUED | OUTPATIENT
Start: 2021-09-20 | End: 2021-09-25

## 2021-09-20 RX ORDER — ASPIRIN/CALCIUM CARB/MAGNESIUM 324 MG
162 TABLET ORAL ONCE
Refills: 0 | Status: COMPLETED | OUTPATIENT
Start: 2021-09-20 | End: 2021-09-20

## 2021-09-20 RX ORDER — ROSUVASTATIN CALCIUM 5 MG/1
1 TABLET ORAL
Qty: 0 | Refills: 0 | DISCHARGE

## 2021-09-20 RX ORDER — HEPARIN SODIUM 5000 [USP'U]/ML
6000 INJECTION INTRAVENOUS; SUBCUTANEOUS EVERY 6 HOURS
Refills: 0 | Status: DISCONTINUED | OUTPATIENT
Start: 2021-09-20 | End: 2021-09-21

## 2021-09-20 RX ORDER — HEPARIN SODIUM 5000 [USP'U]/ML
INJECTION INTRAVENOUS; SUBCUTANEOUS
Qty: 25000 | Refills: 0 | Status: DISCONTINUED | OUTPATIENT
Start: 2021-09-20 | End: 2021-09-21

## 2021-09-20 RX ORDER — ROSUVASTATIN CALCIUM 5 MG/1
40 TABLET ORAL AT BEDTIME
Refills: 0 | Status: DISCONTINUED | OUTPATIENT
Start: 2021-09-20 | End: 2021-09-20

## 2021-09-20 RX ORDER — INSULIN ASPART 100 [IU]/ML
25 INJECTION, SOLUTION SUBCUTANEOUS
Qty: 0 | Refills: 0 | DISCHARGE

## 2021-09-20 RX ORDER — METOPROLOL TARTRATE 50 MG
75 TABLET ORAL EVERY 12 HOURS
Refills: 0 | Status: DISCONTINUED | OUTPATIENT
Start: 2021-09-21 | End: 2021-09-23

## 2021-09-20 RX ORDER — NITROGLYCERIN 6.5 MG
0.4 CAPSULE, EXTENDED RELEASE ORAL
Refills: 0 | Status: DISCONTINUED | OUTPATIENT
Start: 2021-09-20 | End: 2021-09-20

## 2021-09-20 RX ORDER — DEXTROSE 50 % IN WATER 50 %
12.5 SYRINGE (ML) INTRAVENOUS ONCE
Refills: 0 | Status: DISCONTINUED | OUTPATIENT
Start: 2021-09-20 | End: 2021-09-25

## 2021-09-20 RX ORDER — INSULIN LISPRO 100/ML
VIAL (ML) SUBCUTANEOUS AT BEDTIME
Refills: 0 | Status: DISCONTINUED | OUTPATIENT
Start: 2021-09-20 | End: 2021-09-25

## 2021-09-20 RX ORDER — MORPHINE SULFATE 50 MG/1
4 CAPSULE, EXTENDED RELEASE ORAL ONCE
Refills: 0 | Status: DISCONTINUED | OUTPATIENT
Start: 2021-09-20 | End: 2021-09-20

## 2021-09-20 RX ORDER — GLUCAGON INJECTION, SOLUTION 0.5 MG/.1ML
1 INJECTION, SOLUTION SUBCUTANEOUS ONCE
Refills: 0 | Status: DISCONTINUED | OUTPATIENT
Start: 2021-09-20 | End: 2021-09-25

## 2021-09-20 RX ORDER — POTASSIUM CHLORIDE 20 MEQ
20 PACKET (EA) ORAL
Refills: 0 | Status: COMPLETED | OUTPATIENT
Start: 2021-09-20 | End: 2021-09-21

## 2021-09-20 RX ORDER — OXYBUTYNIN CHLORIDE 5 MG
5 TABLET ORAL EVERY 12 HOURS
Refills: 0 | Status: DISCONTINUED | OUTPATIENT
Start: 2021-09-21 | End: 2021-09-25

## 2021-09-20 RX ORDER — HEPARIN SODIUM 5000 [USP'U]/ML
5000 INJECTION INTRAVENOUS; SUBCUTANEOUS ONCE
Refills: 0 | Status: COMPLETED | OUTPATIENT
Start: 2021-09-20 | End: 2021-09-20

## 2021-09-20 RX ORDER — TICAGRELOR 90 MG/1
90 TABLET ORAL EVERY 12 HOURS
Refills: 0 | Status: DISCONTINUED | OUTPATIENT
Start: 2021-09-21 | End: 2021-09-21

## 2021-09-20 RX ORDER — METOPROLOL TARTRATE 50 MG
1 TABLET ORAL
Qty: 0 | Refills: 0 | DISCHARGE

## 2021-09-20 RX ORDER — FUROSEMIDE 40 MG
20 TABLET ORAL DAILY
Refills: 0 | Status: DISCONTINUED | OUTPATIENT
Start: 2021-09-21 | End: 2021-09-22

## 2021-09-20 RX ADMIN — Medication 162 MILLIGRAM(S): at 15:26

## 2021-09-20 RX ADMIN — MORPHINE SULFATE 4 MILLIGRAM(S): 50 CAPSULE, EXTENDED RELEASE ORAL at 18:23

## 2021-09-20 RX ADMIN — HEPARIN SODIUM 1000 UNIT(S)/HR: 5000 INJECTION INTRAVENOUS; SUBCUTANEOUS at 18:34

## 2021-09-20 RX ADMIN — MORPHINE SULFATE 4 MILLIGRAM(S): 50 CAPSULE, EXTENDED RELEASE ORAL at 21:19

## 2021-09-20 RX ADMIN — Medication 35 UNIT(S): at 23:18

## 2021-09-20 RX ADMIN — TICAGRELOR 180 MILLIGRAM(S): 90 TABLET ORAL at 23:14

## 2021-09-20 RX ADMIN — HEPARIN SODIUM 5000 UNIT(S): 5000 INJECTION INTRAVENOUS; SUBCUTANEOUS at 18:33

## 2021-09-20 NOTE — H&P ADULT - PROBLEM SELECTOR PLAN 5
ISS  monitor fingersticks  check A1C  cont levemir as 40 units QHS and novolog as 20 units TID UA showed: moderate bacteria, small leuk, neg nitrite  patient is asymptomatic  check urine culture  Will hold off abx for now

## 2021-09-20 NOTE — ED PROVIDER NOTE - OBJECTIVE STATEMENT
65yo F w PMHx of ALLEY, CKD, HTN, and COVID-10 reporting to ED with chest pain. Patient reports 2 hours of substernal chest pressure that began during exertion when patient was gardening. She reports the pain is described as pressure and is radiating down the left arm. She reports exertional component. She denies any pleuritic or positional component. She denies any abdominal pain, nausea, or vomiting. She reports a history of COVID-19 PNA requiring admission in June of 2020. She was placed on Eliquis for 30 days post discharge. Her last cardiac cath was in 2018.

## 2021-09-20 NOTE — H&P ADULT - NSICDXPASTMEDICALHX_GEN_ALL_CORE_FT
PAST MEDICAL HISTORY:  CKD (chronic kidney disease) stage 3, GFR 30-59 ml/min     Hyperlipemia     Hypertension     Open-angle glaucoma of left eye, indeterminate stage, unspecified open-angle glaucoma type     ALLEY (obstructive sleep apnea) on cpap QHS    Type 2 diabetes mellitus with complication, with long-term current use of insulin

## 2021-09-20 NOTE — H&P ADULT - PROBLEM SELECTOR PLAN 1
Monitor on tele  s/p asa and was started on hep gtt in ED  cards consulted  cont asa  Monitor PTT  echo ordered  NPO p MN for possible cath in AM  cont statin Monitor on tele  Trop: 54 -->82, CKMB; 11.1  s/p asa and was started on hep gtt in ED  cards consulted  cont asa and statin  cont hep gtt, Monitor PTT  echo ordered  Patient with had normal cath in 2018  Follow cards regarding plan for cath, if no plan for cath, then obtain CTA chest to r/o PE.  Will keep patient NPO p MN for possible cath in AM Monitor on tele  Trop: 54 -->82, CKMB; 11.1  s/p asa and was started on hep gtt in ED  cards consulted  cont asa and statin  cont hep gtt, Monitor PTT  echo ordered  Patient with had normal cath in 2018  Per cards, obtain CTA chest to r/o PE prior to cath.   Will keep patient NPO p MN for possible cath in AM Monitor on tele  Trop: 54 -->82, CKMB; 11.1  s/p asa and was started on hep gtt in ED  cards consult appreciated  cont asa and statin  Brilinta loaded --> cont brilinta  cont hep gtt, Monitor PTT  echo ordered  Patient with had normal cath in 2018  Will obtain CTA chest to r/o PE prior to cath.   Will keep patient NPO p MN for possible cath in AM Monitor on tele  Trop: 54 -->82, CKMB; 11.1  s/p asa  Started on heparin gtt ACS  cards consult appreciated  cont asa and statin  Brilinta loaded --> cont Brilinta  cont hep gtt, Monitor PTT  2D echo heart ordered  Patient with had normal cath in 2018  Will obtain CTA chest to r/o PE prior to cath. given [id number]  Will keep patient NPO p MN for possible cath in AM

## 2021-09-20 NOTE — H&P ADULT - PROBLEM SELECTOR PLAN 3
Monitor EKG  Avoid QT prolonging agents Monitor EKG  Repeat EKG in AM  Avoid QT prolonging agents QTc appx 500  Monitor on Tele  Repeat EKG in AM  Avoid QT prolonging agents

## 2021-09-20 NOTE — H&P ADULT - ASSESSMENT
65 y/o F hx of ALLEY (on CPAP), CKD, HTN, COVID 19 [2020] HTN, type 2 DM on insulin, pulmonary HTN, presents with chest pain since the afternoon. admitted for NSTEMI 67 y/o Female hx of ALLEY (on CPAP), CKD, HTN, COVID 19 [2020] HTN, Type 2 DM (on insulin), pulmonary HTN a/w NSTEMI 65 y/o Female hx of ALLEY (on CPAP), CKD, HTN, COVID 19 [2020] HTN, Type 2 DM (on insulin), pulmonary HTN a/w NSTEMI, r/o PE, r/o DVT

## 2021-09-20 NOTE — CONSULT NOTE ADULT - SUBJECTIVE AND OBJECTIVE BOX
CHIEF COMPLAINT: Chest pain     HISTORY OF PRESENT ILLNESS: This is a 65 y/o F hx of ALLEY (on CPAP), CKD, HTN, COVID 19 [2020] HTN, type 2 DM on insulin, pulmonary HTN, who presents with chest pain and heartburn that started at 2pm today while doing yard work outside. She states the intense pain last a few minutes but the heartburn lasted for over an hour and came to the ED. She denies any sob, nausea, vomitibng, fever, chills, headache, palpitations, dizziness, abd pain. She received morphine that relieved her pain. She has lower extremity edema that she says has been chonic over the past two months and noticed some increased fatigue while ambulating. Reports covid in March and is fully vaccinated. She had normal coronary cath in 2018 and had recent echo in 4/2021 with normal EF. She was started on hep gtt in ER    In the ED, trops were 54---->82, D-dimer 1771      Allergies    No Known Allergies    Intolerances    	    MEDICATIONS:  heparin   Injectable 6000 Unit(s) IV Push every 6 hours PRN  heparin  Infusion.  Unit(s)/Hr IV Continuous <Continuous>              sodium chloride 0.9% lock flush 3 milliLiter(s) IV Push every 8 hours      PAST MEDICAL & SURGICAL HISTORY:  Hypertension    Hyperlipemia    Type 2 diabetes mellitus with complication, with long-term current use of insulin    Open-angle glaucoma of left eye, indeterminate stage, unspecified open-angle glaucoma type    CKD (chronic kidney disease) stage 3, GFR 30-59 ml/min    ALLEY (obstructive sleep apnea)  on cpap QHS    Fibroids  history of removal two times    H/O bilateral breast implants  1985        FAMILY HISTORY:  Family history of coronary artery disease        SOCIAL HISTORY:    [x] Non-smoker  [ ] Smoker  [ ] Alcohol  [x] Denies Alcohol      REVIEW OF SYSTEMS:  CONSTITUTIONAL: no fevers or chills  EYES/ENT: No visual changes; No vertigo or throat pain  NECK: No JVD  RESPIRATORY:  No cough, wheezing, chills or hemoptysis, SOB  CARDIOVASCULAR: + chest pain, + leg swelling, palpitations, passing out, dizziness,  GASTROINTESTINAL: No abdominal or epigastric pain. No nausea/vomiting/melena  Genitourinary: No dysuria, frequency or hematuria  NEUROLOGICAL: No numbness or weakness  SKIN: No itching, burning, rashes or lesions      PHYSICAL EXAM:  T(C): 36.7 (09-20-21 @ 21:29), Max: 37.1 (09-20-21 @ 17:08)  HR: 82 (09-20-21 @ 17:08) (82 - 97)  BP: 162/79 (09-20-21 @ 21:29) (130/56 - 162/79)  RR: 16 (09-20-21 @ 21:29) (16 - 18)  SpO2: 96% (09-20-21 @ 21:29) (96% - 100%)  Wt(kg): --  ICU Vital Signs Last 24 Hrs  T(C): 36.7 (20 Sep 2021 21:29), Max: 37.1 (20 Sep 2021 17:08)  T(F): 98 (20 Sep 2021 21:29), Max: 98.7 (20 Sep 2021 17:08)  HR: 82 (20 Sep 2021 17:08) (82 - 97)  BP: 162/79 (20 Sep 2021 21:29) (130/56 - 162/79)  BP(mean): --  ABP: --  ABP(mean): --  RR: 16 (20 Sep 2021 21:29) (16 - 18)  SpO2: 96% (20 Sep 2021 21:29) (96% - 100%)    I&O's Summary        Appearance: 65 y/o female sitting up in bed in NAD  Cardiovascular: Normal S1 S2, No JVD, No murmurs, trace edema to LE  Respiratory: Lungs clear to auscultation	  Psychiatry: A & O x 3, Mood & affect appropriate  Gastrointestinal:  Soft, Non-tender, + BS	  Skin: No rashes, No ecchymoses, No cyanosis	  Neurologic: Non-focal  Extremities: Warm and well perfused. 2+ pulses bilaterally        LABS:	 	    CBC Full  -  ( 20 Sep 2021 15:36 )  WBC Count : 7.34 K/uL  Hemoglobin : 14.2 g/dL  Hematocrit : 43.2 %  Platelet Count - Automated : 279 K/uL  Mean Cell Volume : 90.6 fL  Mean Cell Hemoglobin : 29.8 pg  Mean Cell Hemoglobin Concentration : 32.9 gm/dL  Auto Neutrophil # : 3.19 K/uL  Auto Lymphocyte # : 3.06 K/uL  Auto Monocyte # : 0.80 K/uL  Auto Eosinophil # : 0.23 K/uL  Auto Basophil # : 0.05 K/uL  Auto Neutrophil % : 43.5 %  Auto Lymphocyte % : 41.7 %  Auto Monocyte % : 10.9 %  Auto Eosinophil % : 3.1 %  Auto Basophil % : 0.7 %    09-20    x   |  x   |  x   ----------------------------<  x   3.3<L>   |  x   |  x   09-20    144  |  98  |  26<H>  ----------------------------<  220<H>  3.7   |  33<H>  |  1.20    Ca    10.5      20 Sep 2021 15:36  Mg     2.00     09-20    TPro  7.8  /  Alb  4.4  /  TBili  0.6  /  DBili  x   /  AST  32  /  ALT  20  /  AlkPhos  94  09-20      proBNP: Serum Pro-Brain Natriuretic Peptide: 72 pg/mL (09-20 @ 17:38)      CARDIAC MARKERS:  HsT: 53----82            	    ECG: NSR, RBBB, non specific TWI, unchanged from last ekg 	    PREVIOUS DIAGNOSTIC TESTING:    [x] Echocardiogram:  e< from: Transthoracic Echocardiogram (04.02.21 @ 14:08) >  CONCLUSIONS:  1. Mitral annular calcification, otherwise normal mitral  valve.  2. Calcified trileaflet aortic valve with normal opening.  3. Increased relative wall thickness with normal left  ventricular mass index, consistent with concentric left  ventricular remodeling.  4. Normal left ventricular systolic function. No segmental  wall motion abnormalities.  5. Normal right ventricular size and function.  6. Estimated pulmonary artery systolic pressure equals 42  mm Hg, assuming right atrial pressure equals 10  mm Hg,  consistent with mild pulmonary hypertension.      [x]  Catheterization:  < from: Cardiac Cath Lab - Adult (07.06.18 @ 11:20) >  CORONARY VESSELS: The coronary circulation is right dominant.  LM:   --  LM: Normal.  LAD:   --  LAD: Normal.  CX:   --  Circumflex: Normal.  RCA:   --  RCA: Normal.  COMPLICATIONS: There were no complications.  DIAGNOSTIC RECOMMENDATIONS: The patient should continue with the present  medications.      [x] Stress Test:  	  st< from: Nuclear Stress Test-Exercise (05.02.18 @ 14:45) >  IMPRESSIONS:Abnormal Study  * Exercise capacity: 7 METS, Fair for age and gender.  * Chest Pain: No chest pain with exercise.  * Symptom: Shortness of breath.  * HR Response: Appropriate.  * BP Response: Appropriate.  * Heart Rhythm: Sinus Rhythm - 90 BPM.  * Baseline ECG: Nonspecific ST-T wave abnormality.  * ECG Changes: ST Depression: Approximately 0.5 mm  horizontal in leads II, III, aVF, and V3-V6 started at  03:00 min of exercise at HR of 126 and persisted at least  9:30 min into recovery.  These changes did not meet  ischemic criteria.  * Arrhythmia: None.  * Review of raw data shows: The study is of good technical  quality., Breast attenuation artifact.  * The left ventricle was normal in size. There are mild  defects in the mid to distal anterior wall that are fixed  and correct with prone imaging suggestive of attenuation  artifact.  No focal perfusion defects suggestive of  infarct or ischemia.  * However, the left ventricular cavity appears to dilate  with stress with a quantified TID value of 1.18 which is  abnormal for this protocol.   In the absence of focal  perfusion defects, the clinical significance of this  finding is uncertain.  * Post-stress gated wall motion analysis wasperformed  (LVEF = 61 %;LVEDV = 75 ml.), revealing normal overall LV  function.  *** No previous Nuclear/Stress exam.  ---------------------------------------------------------

## 2021-09-20 NOTE — CONSULT NOTE ADULT - ASSESSMENT
65 y/o F hx of ALLEY (on CPAP), CKD, HTN, COVID 19 [2020] HTN, type 2 DM on insulin, pulmonary HTN, who presents with chest pain and heartburn found to have elevated trops and started on hep gtt    Plan:  - Patient came to ER with c/o chest pain. EKG unchanged from last time CE positive. Chest pain free at present.   - ABILIO score 3  -  and heparin loaded in ED and is on heparin gtt fro mthe ED  - Please load patient with 180mg of Brilinta as she did not receive in the ED  - Continue heparin gtt based on ACS protocol  - D-Dimer 1771, Please order CTA to rule out PE  - Admit to tele  - Assess for resolution of chest pain and recurrence in chest pain.   - Serial EKG to assess for resolution of ST changes  - Monitor vital signs to monitor hemodynamic stability  - Continuous cardiac monitoring to monitor for arrhythmias  - CBC, CMP, coags, HbA1C, TSH, lipids for comorbidities, Trend cardiac enzymes  - Aspirin 81 PO daily, Brilinta 90 PO BID, Lipitor 80 mg PO daily  - NPO MN for cardiac cath in am. If persistent chest pain with EKG changes, will plan for emergent cath   - Introduce beta blockers as tolerated: Metoprolol 12.5 mg BID  - Hold ACE for now in anticipation of possible cath.   - Low fat DASH diet  - ECHO: 2D ECHO to evaluate LV function and wall motion abnormalities

## 2021-09-20 NOTE — H&P ADULT - NSHPPHYSICALEXAM_GEN_ALL_CORE
Vital Signs Last 24 Hrs  T(C): 36.9 (20 Sep 2021 23:30), Max: 37.1 (20 Sep 2021 17:08)  T(F): 98.5 (20 Sep 2021 23:30), Max: 98.7 (20 Sep 2021 17:08)  HR: 89 (20 Sep 2021 23:30) (82 - 97)  BP: 152/69 (20 Sep 2021 23:30) (130/56 - 162/79)  BP(mean): --  RR: 18 (20 Sep 2021 23:30) (16 - 18)  SpO2: 96% (20 Sep 2021 23:30) (96% - 100%)

## 2021-09-20 NOTE — H&P ADULT - PROBLEM SELECTOR PLAN 6
cont ramipril, metoprolol ISS  monitor fingersticks  check A1C  cont Levemir as 35 units BID and Novolog as 15 units TID ISS  monitor fingersticks  check A1C  cont Levemir as 35 units BID and Novolog as 15 units TID [dosages decreased while patient is admitted] [id number]  ISS  monitor fingersticks  check A1C  cont Levemir as 35 units BID and Novolog as 15 units TID [dosages decreased while patient is admitted]

## 2021-09-20 NOTE — ED PROVIDER NOTE - CLINICAL SUMMARY MEDICAL DECISION MAKING FREE TEXT BOX
65yo F w PMHx of ALLEY, CKD, HTN, and COVID-10 reporting to ED with chest pain. Patient staffed with supervising physician, Dr. Gunderson; medical record reviewed. IV established, patient placed on cardiac monitoring. High suspicion for ACS; Heart score > 4. EcG w/ TWI in anterior septal leads, appears worse than previous. COVID-19 Hx, was on Eliquis, no record of PE. Angina type symptoms w/ exertional component - less suspicious for PE but more for ACS. CBC/CMP reassuring. Low suspicion for PNA/ CHF. In terms of AD, lower suspicion as well; No thunderclap onset of CP, no FND, not hypotensive, no pulse deficit. Troponin critical at 54. Although she had CKD, Cr was WNL. Spoke with Cardiology who agreed w/ concern for NSTEMI; Heparin gtt initiated. Spoke with Dr. Corbin of medicine who accepted patient for NSTEMI w/ cardiology consult. Spoke with patient extensively and discussed presentation, clinical concerns, and admission plan. She was gracious and accepting of our plan. All questions answered and she was admitted in stable condition w/ second troponin pending.

## 2021-09-20 NOTE — H&P ADULT - PROBLEM SELECTOR PLAN 2
check LE dopplers  pro bnp added on   CXR: clear lungs. No other signs of fluid overload except the lower extremity edema  check echo patient noted with worsening LE edema and elevated D-Dimer  check LE dopplers  Pro BNP: 72  CXR: clear lungs. No other signs of fluid overload except the lower extremity edema  check echo patient noted with worsening LE edema and elevated D-Dimer  Pro BNP: 72  check LE dopplers  CTA chest ordered to r/o PE  CXR: clear lungs. No other signs of fluid overload except the lower extremity edema  check echo patient noted with worsening LE edema and elevated D-Dimer  Pro BNP: 72 likely fasely low due to body habitus   check b/l LE dopplers r/o DVT  CTA chest ordered to r/o PE  CXR: clear lungs. No other signs of fluid overload except the lower extremity edema  check 2D echo heart

## 2021-09-20 NOTE — H&P ADULT - HISTORY OF PRESENT ILLNESS
67 y/o F hx of ALLEY (on CPAP), CKD, HTN, COVID 19 [2020] HTN, type 2 DM on insulin, pulmonary HTN, presents with chest pain since the afternoon. 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 She states the burning sensation went away, but she felt something was not right. Therefore, she decided to come to ED for further eval. Has LE edema which is chronic. Denies fever, chills, cough, SOB, abdominal pain, nausea, vomiting, melena, hematochezia, or dysuria.  67 y/o F hx of ALLEY (on CPAP), CKD, HTN, COVID 19 [2020] HTN, type 2 DM on insulin, pulmonary HTN, presents with chest pain since the afternoon. 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 She states the burning sensation went away, but she felt something was not right. Therefore, she decided to come to ED for further eval. Has LE edema which is chronic. Denies fever, chills, cough, SOB, abdominal pain, nausea, vomiting, melena, hematochezia, or dysuria.     Patient states she possibly had stress test earlier this year, but not sure.  67 y/o Female hx of ALLEY (on CPAP), CKD, HTN, COVID 19 [2020] HTN, Type 2 DM (on insulin), pulmonary HTN; Pt c/o chest pain since the afternoon. 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 She states the burning sensation went away on its own, but she felt something was "not right". She decided to come to ED for further eval. Has LE edema which is chronic. Denies associated palpitations, lightheadedness or LOC. Reports no fever, chills, cough, SOB, abdominal pain, nausea, vomiting, melena, hematochezia, or dysuria. Patient states she possibly had stress test earlier this year, but not sure.

## 2021-09-20 NOTE — ED PROVIDER NOTE - ATTENDING CONTRIBUTION TO CARE
Patient is a 67 yo F with history of HTN, hyperlipidemia, type 2 DM, pulmonary HTN, history of COVID19 in March/April 2020 here for evaluation of substernal chest pressure and heartburn feeling while working outside. 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 She reports she thinks pain is worse with exertion. She states she has not been the same since having COVID in 2020 and has had CARDENAS since then. She is on lasix 20 mg daily. Denies fevers, chills, vomiting. She denies dysuria but reports foul smelling urine today. + lower extremity swelling.     VS noted  Gen. no acute distress, Non toxic   HEENT: EOMI, mmm  Lungs: CTAB/L no C/ W /R   CVS: RRR   Abd; Soft non tender, non distended   Ext: b/l pedal edema  Skin: no rash  Neuro AAOx3 non focal clear speech  a/p: chest pressure/ diaphoresis - ekg shows RBBB. Plan for ACS work up, labs, CXR, trop. Will reach out to her cardiologist, Dr. Iyer. Likely CDU for echo/ cardiology.  - Jalyn LOPEZ Patient is a 67 yo F with history of HTN, hyperlipidemia, type 2 DM, pulmonary HTN, history of COVID19 in March/April 2020 here for evaluation of substernal chest pressure and heartburn feeling while working outside. 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 She reports she thinks pain is worse with exertion. She states she has not been the same since having COVID in 2020 and has had CARDENAS since then. She is on lasix 20 mg daily. Denies fevers, chills, vomiting. She denies dysuria but reports foul smelling urine today. + lower extremity swelling.     VS noted  Gen. no acute distress, Non toxic   HEENT: EOMI, mmm  Lungs: CTAB/L no C/ W /R   CVS: RRR   Abd; Soft non tender, non distended   Ext: b/l pedal edema  Skin: no rash  Neuro AAOx3 non focal clear speech  a/p: chest pressure/ diaphoresis - ekg shows RBBB. Plan for ACS work up, labs, CXR, trop.   update: Patient's cardiologist is out of system. Trop came back elevated. Concern for NSTEMI. Discussed with cardiology, plan for heparin and admit to tele.   Aziza Gunderson MD

## 2021-09-20 NOTE — H&P ADULT - PROBLEM SELECTOR PROBLEM 5
Type 2 diabetes mellitus with hyperglycemia, with long-term current use of insulin Abnormal urinalysis

## 2021-09-20 NOTE — ED PROVIDER NOTE - CONSTITUTIONAL, MLM
normal... Well appearing obese female, awake, alert, oriented to person, place, time/situation and in no apparent distress.

## 2021-09-20 NOTE — H&P ADULT - NEGATIVE GASTROINTESTINAL SYMPTOMS
no nausea/no vomiting/no diarrhea/no constipation no nausea/no vomiting/no diarrhea/no constipation/no abdominal pain

## 2021-09-20 NOTE — ED PROVIDER NOTE - EKG ADDITIONAL INFORMATION FREE TEXT
Obtained for the indication of chest pain: 9/20/21, 1309  Rate: 95  Rhythm: NSR  Intervals: OR: 164, QRS: 122, QTS: 522  Intrepretation: NSR, no evidence of BERHANE/STD; TWI present in V1-V4; compared to previous EcG in 6/2020 and appears unchanged w/ TWI present

## 2021-09-20 NOTE — H&P ADULT - ATTENDING COMMENTS
dd The assessment and plan were supplemented and modified by attending where needed. 65 y/o Female hx of ALLEY (on CPAP), CKD, HTN, COVID 19 [2020] HTN, Type 2 DM (on insulin), pulmonary HTN a/w NSTEMI, r/o PE, r/o DVT    The assessment and plan were supplemented and modified by attending where needed.

## 2021-09-20 NOTE — CONSULT NOTE ADULT - ATTENDING COMMENTS
Intermediate risk NSTEMI vs Type 2 MI. Noting CV risk factors and progressive anginal symptoms, incl elevated hsTrop and CKMB-plan for early invasive management-will be planned for cath-NPO post mid night. Prior angio from 2018 personally reviewed-radial access at that time-today R-radial faintly palpable/1+. All risks of angio/PCI explained w/o obvious barrier to comprehension, alternatives discussed-pt amenable for angiogram.    Thanks for the opportunity of participating in the care of this pt. Please call [phone] for further questions/issues.    Jaciel Al MD  Interventional cardiologist  Coverage information @ [email or web address] ,  pw:  [email or web address]

## 2021-09-20 NOTE — ED ADULT NURSE NOTE - NSICDXPASTSURGICALHX_GEN_ALL_CORE_FT
PAST SURGICAL HISTORY:  Fibroids history of removal two times    H/O bilateral breast implants 1985

## 2021-09-20 NOTE — ED PROVIDER NOTE - CARDIAC, MLM
DISPLAY PLAN FREE TEXT Normal rate, regular rhythm.  Heart sounds S1, S2.  No murmurs, rubs or gallops. Normal rate, regular rhythm.  Heart sounds S1, S2.  No murmurs, rubs or gallops. radial pulses 2+, bilaterally

## 2021-09-20 NOTE — ED ADULT TRIAGE NOTE - PRO INTERPRETER NEED 2
10 [facility]   Statement to Patients  April 1, 2014      In an effort to ensure the large volume of patient prescription refills is processed in the most efficient and expeditious manner, we are asking our patients to assist us by calling your Pharmacy for all prescription refills, this will include also your  Mail Order Pharmacy. The pharmacy will contact our office electronically to continue the refill process. Please do not wait until the last minute to call your pharmacy. We need at least 48 hours (2days) to fill prescriptions. We also encourage you to call your pharmacy before going to  your prescription to make sure it is ready. With regard to controlled substance prescription refill requests (narcotic refills) that need to be picked up at our office, we ask your cooperation by providing us with at least 72 hours (3days) notice that you will need a refill. We will not refill narcotic prescription refill requests after 4:00pm on any weekday, Monday through Thursday, or after 2:00pm on Fridays, or on the weekends. We encourage everyone to explore another way of getting your prescription refill request processed using [employer], our patient web portal through our electronic medical record system. [employer] is an efficient and effective way to communicate your medication request directly to the office and  downloadable as an uriah on your smart phone . [employer] also features a review functionality that allows you to view your medication list as well as leave messages for your physician. Are you ready to get connected? If so please review the attatched instructions or speak to any of our staff to get you set up right away! Thank you so much for your cooperation. Should you have any questions please contact our Practice Administrator. The Physicians and Staff,  [facility] [id number] [address]  What is a living will?   A living will is a legal form you use to write down the kind of care you want at the end of your life. It is used by the health professionals who will treat you if you aren't able to decide for yourself. If you put your wishes in writing, your loved ones and others will know what kind of care you want. They won't need to guess. This can ease your mind and be helpful to others. A living will is not the same as an estate or property will. An estate will explains what you want to happen with your money and property after you die. Is a living will a legal document? A living will is a legal document. Each state has its own laws about living warren. If you move to another state, make sure that your living will is legal in the state where you now live. Or you might use a universal form that has been approved by many states. This kind of form can sometimes be completed and stored online. Your electronic copy will then be available wherever you have a connection to the Internet. In most cases, doctors will respect your wishes even if you have a form from a different state. · You don't need an  to complete a living will. But legal advice can be helpful if your state's laws are unclear, your health history is complicated, or your family can't agree on what should be in your living will. · You can change your living will at any time. Some people find that their wishes about end-of-life care change as their health changes. · In addition to making a living will, think about completing a medical power of  form. This form lets you name the person you want to make end-of-life treatment decisions for you (your \"health care agent\") if you're not able to. Many hospitals and nursing homes will give you the forms you need to complete a living will and a medical power of . · Your living will is used only if you can't make or communicate decisions for yourself anymore.  If you become able to make decisions again, you can accept or refuse any treatment, no matter what you wrote in your living will. · Your state may offer an online registry. This is a place where you can store your living will online so the doctors and nurses who need to treat you can find it right away. What should you think about when creating a living will? Talk about your end-of-life wishes with your family members and your doctor. Let them know what you want. That way the people making decisions for you won't be surprised by your choices. Think about these questions as you make your living will:  · Do you know enough about life support methods that might be used? If not, talk to your doctor so you know what might be done if you can't breathe on your own, your heart stops, or you're unable to swallow. · What things would you still want to be able to do after you receive life-support methods? Would you want to be able to walk? To speak? To eat on your own? To live without the help of machines? · If you have a choice, where do you want to be cared for? In your home? At a hospital or nursing home? · Do you want certain Denominational practices performed if you become very ill? · If you have a choice at the end of your life, where would you prefer to die? At home? In a hospital or nursing home? Somewhere else? · Would you prefer to be buried or cremated? · Do you want your organs to be donated after you die? What should you do with your living will? · Make sure that your family members and your health care agent have copies of your living will. · Give your doctor a copy of your living will to keep in your medical record. If you have more than one doctor, make sure that each one has a copy. · You may want to put a copy of your living will where it can be easily found. Where can you learn more? Go to http://lore-vin.info/. Enter D063 in the search box to learn more about \"Learning About Living Sabine Dates. \"  Current as of: August 8, 2016  Content Version: 11.3  © 7301-4889 [email or web address]. Care instructions adapted under license by [employer] (which disclaims liability or warranty for this information). If you have questions about a medical condition or this instruction, always ask your healthcare professional. [facility] any warranty or liability for your use of this information. Advance Directives: Care Instructions  Your Care Instructions  An advance directive is a legal way to state your wishes at the end of your life. It tells your family and your doctor what to do if you can no longer say what you want. There are two main types of advance directives. You can change them any time that your wishes change. · A living will tells your family and your doctor your wishes about life support and other treatment. · A durable power of  for health care lets you name a person to make treatment decisions for you when you can't speak for yourself. This person is called a health care agent. If you do not have an advance directive, decisions about your medical care may be made by a doctor or a  who doesn't know you. It may help to think of an advance directive as a gift to the people who care for you. If you have one, they won't have to make tough decisions by themselves. Follow-up care is a key part of your treatment and safety. Be sure to make and go to all appointments, and call your doctor if you are having problems. It's also a good idea to know your test results and keep a list of the medicines you take. How can you care for yourself at home? · Discuss your wishes with your loved ones and your doctor. This way, there are no surprises. · Many states have a unique form. Or you might use a universal form that has been approved by many states. This kind of form can sometimes be completed and stored online.  Your electronic copy will then be available wherever you have a connection to the Internet. In most cases, doctors will respect your wishes even if you have a form from a different state. · You don't need a  to do an advance directive. But you may want to get legal advice. · Think about these questions when you prepare an advance directive:  ¨ Who do you want to make decisions about your medical care if you are not able to? Many people choose a family member or close friend. ¨ Do you know enough about life support methods that might be used? If not, talk to your doctor so you understand. ¨ What are you most afraid of that might happen? You might be afraid of having pain, losing your independence, or being kept alive by machines. ¨ Where would you prefer to die? Choices include your home, a hospital, or a nursing home. ¨ Would you like to have information about hospice care to support you and your family? ¨ Do you want to donate organs when you die? ¨ Do you want certain Anabaptism practices performed before you die? If so, put your wishes in the advance directive. · Read your advance directive every year, and make changes as needed. When should you call for help? Be sure to contact your doctor if you have any questions. Where can you learn more? Go to http://lore-vin.info/. Enter R264 in the search box to learn more about \"Advance Directives: Care Instructions. \"  Current as of: November 17, 2016  Content Version: 11.3  © 1762-1662 [employer]. Care instructions adapted under license by [employer] (which disclaims liability or warranty for this information). If you have questions about a medical condition or this instruction, always ask your healthcare professional. [address] any warranty or liability for your use of this information. English

## 2021-09-20 NOTE — H&P ADULT - NSHPLABSRESULTS_GEN_ALL_CORE
EKG: NSR 95  RBBB QTC: 522 TWI in V1-V4, III, AVR (seen in previous EKG)                          14.2   7.34  )-----------( 279      ( 20 Sep 2021 15:36 )             43.2       09-20    144  |  98  |  26<H>  ----------------------------<  220<H>  3.7   |  33<H>  |  1.20    Ca    10.5      20 Sep 2021 15:36    TPro  7.8  /  Alb  4.4  /  TBili  0.6  /  DBili  x   /  AST  32  /  ALT  20  /  AlkPhos  94  09-20      Troponin: 54 -->82 EKG: NSR 95  RBBB QTC: 522 TWI in V1-V4, III, AVR (seen in previous EKG)                          14.2   7.34  )-----------( 279      ( 20 Sep 2021 15:36 )             43.2       09-20    144  |  98  |  26<H>  ----------------------------<  220<H>  3.7   |  33<H>  |  1.20    Ca    10.5      20 Sep 2021 15:36    TPro  7.8  /  Alb  4.4  /  TBili  0.6  /  DBili  x   /  AST  32  /  ALT  20  /  AlkPhos  94  09-20      Troponin: 54 -->82    < from: Cardiac Cath Lab - Adult (07.06.18 @ 11:20) >    CORONARY VESSELS: The coronary circulation is right dominant.  LM:   --  LM: Normal.  LAD:   --  LAD: Normal.  CX:   --  Circumflex: Normal.  RCA:   --  RCA: Normal.  COMPLICATIONS: There were no complications. EKG, 9/20, NSR, 95bpm, RBBB QTC: 522, Tw inv in V1-V4, and isolated in III, no acute ST changes - personally interpreted     CXR, 9/20,  - personally interpreted                Personally reviewed the labs below:               14.2   7.34  )-----------( 279      ( 20 Sep 2021 15:36 )             43.2       09-20    144  |  98  |  26<H>  ----------------------------<  220<H>  3.7   |  33<H>  |  1.20    Ca    10.5      20 Sep 2021 15:36    TPro  7.8  /  Alb  4.4  /  TBili  0.6  /  DBili  x   /  AST  32  /  ALT  20  /  AlkPhos  94  09-20      Troponin: 54 -->82    Personally reviewed prior Cardiac Cath Lab results dated 07.06.18 as below    CORONARY VESSELS: The coronary circulation is right dominant.  LM:   --  LM: Normal.  LAD:   --  LAD: Normal.  CX:   --  Circumflex: Normal.  RCA:   --  RCA: Normal.  COMPLICATIONS: There were no complications. #1 EKG, , NSR, 95bpm, RBBB QTC: 522, Tw inv in V1-V4, and isolated in III, no acute ST changes - personally interpreted     #2 Repeat EK/21, 85bpm, RBBB QTC: 499, Tw inv in V1-V2, and isolated in III, no acute ST changes - personally interpreted     CXR, , clear lungs, no pleural effusions - personally interpreted                Personally reviewed the labs below:               14.2   7.34  )-----------( 279      ( 20 Sep 2021 15:36 )             43.2           144  |  98  |  26<H>  ----------------------------<  220<H>  3.7   |  33<H>  |  1.20    Ca    10.5      20 Sep 2021 15:36    TPro  7.8  /  Alb  4.4  /  TBili  0.6  /  DBili  x   /  AST  32  /  ALT  20  /  AlkPhos  94        Troponin: 54 -->82    Personally reviewed prior Cardiac Cath Lab results dated 18 as below    CORONARY VESSELS: The coronary circulation is right dominant.  LM:   --  LM: Normal.  LAD:   --  LAD: Normal.  CX:   --  Circumflex: Normal.  RCA:   --  RCA: Normal.  COMPLICATIONS: There were no complications.

## 2021-09-20 NOTE — H&P ADULT - NSHPSOCIALHISTORY_GEN_ALL_CORE
Denies smoking, alcohol or drug use    Lives alone    Retired Director of  [employer] Denies h/o smoking, alcohol or drug use  Lives alone  Retired  Former Director of

## 2021-09-20 NOTE — H&P ADULT - PROBLEM SELECTOR PLAN 7
on CPAP QHS Hold ramipril in anticipation for cath  cont metoprolol  Hold indapamide Hold ramipril in anticipation for cath  cont metoprolol as tartrate at 75mg BID for now   Hold indapamide

## 2021-09-20 NOTE — H&P ADULT - PROBLEM SELECTOR PLAN 10
1.  Name of PCP: Dr. Odalis Grajeda  2.  PCP Contacted on Admission: [ ] Y    [X ] N    3.  PCP contacted at Discharge: [ ] Y    [ ] N    [ ] N/A  4.  Post-Discharge Appointment Date and Location:  5.  Summary of Handoff given to PCP:

## 2021-09-20 NOTE — H&P ADULT - PROBLEM SELECTOR PLAN 4
UA showed: moderate bacteria, small leuk, neg nitrite  patient is asymptomatic  check urine culture KCl PO   monitor K

## 2021-09-20 NOTE — ED ADULT TRIAGE NOTE - CHIEF COMPLAINT QUOTE
pt c/o burning in the middle of the chest beginning 1 hour ago. pt also reports headache and dizziness with burning which subsided. denies sob, fever, n/v. pt in no distress. hx. DM, HTN, CHF, HLD

## 2021-09-20 NOTE — H&P ADULT - PROBLEM SELECTOR PLAN 9
1.  Name of PCP: Dr. Odalis Grajeda  2.  PCP Contacted on Admission: [ ] Y    [X ] N    3.  PCP contacted at Discharge: [ ] Y    [ ] N    [ ] N/A  4.  Post-Discharge Appointment Date and Location:  5.  Summary of Handoff given to PCP: on hep gtt for VTE px On heparin gtt, no need for additional VTE ppx

## 2021-09-21 LAB
A1C WITH ESTIMATED AVERAGE GLUCOSE RESULT: 10.8 % — HIGH (ref 4–5.6)
ANION GAP SERPL CALC-SCNC: 15 MMOL/L — HIGH (ref 7–14)
APTT BLD: 57 SEC — HIGH (ref 27–36.3)
APTT BLD: 58.7 SEC — HIGH (ref 27–36.3)
BASOPHILS # BLD AUTO: 0.05 K/UL — SIGNIFICANT CHANGE UP (ref 0–0.2)
BASOPHILS NFR BLD AUTO: 0.7 % — SIGNIFICANT CHANGE UP (ref 0–2)
BUN SERPL-MCNC: 22 MG/DL — SIGNIFICANT CHANGE UP (ref 7–23)
CALCIUM SERPL-MCNC: 10.1 MG/DL — SIGNIFICANT CHANGE UP (ref 8.4–10.5)
CHLORIDE SERPL-SCNC: 97 MMOL/L — LOW (ref 98–107)
CHOLEST SERPL-MCNC: 208 MG/DL — HIGH
CK MB BLD-MCNC: 4.2 % — HIGH (ref 0–2.5)
CK MB CFR SERPL CALC: 20.7 NG/ML — HIGH
CK MB CFR SERPL CALC: 24 NG/ML — HIGH
CK SERPL-CCNC: 494 U/L — HIGH (ref 25–170)
CK SERPL-CCNC: SIGNIFICANT CHANGE UP U/L (ref 25–170)
CO2 SERPL-SCNC: 27 MMOL/L — SIGNIFICANT CHANGE UP (ref 22–31)
CREAT SERPL-MCNC: 1.11 MG/DL — SIGNIFICANT CHANGE UP (ref 0.5–1.3)
EOSINOPHIL # BLD AUTO: 0.11 K/UL — SIGNIFICANT CHANGE UP (ref 0–0.5)
EOSINOPHIL NFR BLD AUTO: 1.6 % — SIGNIFICANT CHANGE UP (ref 0–6)
ESTIMATED AVERAGE GLUCOSE: 263 — SIGNIFICANT CHANGE UP
GLUCOSE BLDC GLUCOMTR-MCNC: 305 MG/DL — HIGH (ref 70–99)
GLUCOSE SERPL-MCNC: 332 MG/DL — HIGH (ref 70–99)
HCT VFR BLD CALC: 41.3 % — SIGNIFICANT CHANGE UP (ref 34.5–45)
HCT VFR BLD CALC: 41.8 % — SIGNIFICANT CHANGE UP (ref 34.5–45)
HDLC SERPL-MCNC: 59 MG/DL — SIGNIFICANT CHANGE UP
HGB BLD-MCNC: 13.5 G/DL — SIGNIFICANT CHANGE UP (ref 11.5–15.5)
HGB BLD-MCNC: 13.7 G/DL — SIGNIFICANT CHANGE UP (ref 11.5–15.5)
IANC: 3.58 K/UL — SIGNIFICANT CHANGE UP (ref 1.5–8.5)
IMM GRANULOCYTES NFR BLD AUTO: 0.1 % — SIGNIFICANT CHANGE UP (ref 0–1.5)
LIPID PNL WITH DIRECT LDL SERPL: 122 MG/DL — HIGH
LYMPHOCYTES # BLD AUTO: 2.41 K/UL — SIGNIFICANT CHANGE UP (ref 1–3.3)
LYMPHOCYTES # BLD AUTO: 35.2 % — SIGNIFICANT CHANGE UP (ref 13–44)
MAGNESIUM SERPL-MCNC: 1.9 MG/DL — SIGNIFICANT CHANGE UP (ref 1.6–2.6)
MCHC RBC-ENTMCNC: 29.8 PG — SIGNIFICANT CHANGE UP (ref 27–34)
MCHC RBC-ENTMCNC: 29.9 PG — SIGNIFICANT CHANGE UP (ref 27–34)
MCHC RBC-ENTMCNC: 32.3 GM/DL — SIGNIFICANT CHANGE UP (ref 32–36)
MCHC RBC-ENTMCNC: 33.2 GM/DL — SIGNIFICANT CHANGE UP (ref 32–36)
MCV RBC AUTO: 89.8 FL — SIGNIFICANT CHANGE UP (ref 80–100)
MCV RBC AUTO: 92.7 FL — SIGNIFICANT CHANGE UP (ref 80–100)
MONOCYTES # BLD AUTO: 0.68 K/UL — SIGNIFICANT CHANGE UP (ref 0–0.9)
MONOCYTES NFR BLD AUTO: 9.9 % — SIGNIFICANT CHANGE UP (ref 2–14)
NEUTROPHILS # BLD AUTO: 3.58 K/UL — SIGNIFICANT CHANGE UP (ref 1.8–7.4)
NEUTROPHILS NFR BLD AUTO: 52.5 % — SIGNIFICANT CHANGE UP (ref 43–77)
NON HDL CHOLESTEROL: 149 MG/DL — HIGH
NRBC # BLD: 0 /100 WBCS — SIGNIFICANT CHANGE UP
NRBC # BLD: 0 /100 WBCS — SIGNIFICANT CHANGE UP
NRBC # FLD: 0 K/UL — SIGNIFICANT CHANGE UP
NRBC # FLD: 0 K/UL — SIGNIFICANT CHANGE UP
PHOSPHATE SERPL-MCNC: 2.9 MG/DL — SIGNIFICANT CHANGE UP (ref 2.5–4.5)
PLATELET # BLD AUTO: 264 K/UL — SIGNIFICANT CHANGE UP (ref 150–400)
PLATELET # BLD AUTO: 276 K/UL — SIGNIFICANT CHANGE UP (ref 150–400)
POTASSIUM SERPL-MCNC: 3.3 MMOL/L — LOW (ref 3.5–5.3)
POTASSIUM SERPL-SCNC: 3.3 MMOL/L — LOW (ref 3.5–5.3)
RBC # BLD: 4.51 M/UL — SIGNIFICANT CHANGE UP (ref 3.8–5.2)
RBC # BLD: 4.6 M/UL — SIGNIFICANT CHANGE UP (ref 3.8–5.2)
RBC # FLD: 13.6 % — SIGNIFICANT CHANGE UP (ref 10.3–14.5)
RBC # FLD: 13.8 % — SIGNIFICANT CHANGE UP (ref 10.3–14.5)
SODIUM SERPL-SCNC: 139 MMOL/L — SIGNIFICANT CHANGE UP (ref 135–145)
TRIGL SERPL-MCNC: 134 MG/DL — SIGNIFICANT CHANGE UP
TROPONIN T, HIGH SENSITIVITY RESULT: 305 NG/L — CRITICAL HIGH
TROPONIN T, HIGH SENSITIVITY RESULT: 933 NG/L — CRITICAL HIGH
TSH SERPL-MCNC: 1.27 UIU/ML — SIGNIFICANT CHANGE UP (ref 0.27–4.2)
WBC # BLD: 6.09 K/UL — SIGNIFICANT CHANGE UP (ref 3.8–10.5)
WBC # BLD: 6.89 K/UL — SIGNIFICANT CHANGE UP (ref 3.8–10.5)
WBC # FLD AUTO: 6.09 K/UL — SIGNIFICANT CHANGE UP (ref 3.8–10.5)
WBC # FLD AUTO: 6.89 K/UL — SIGNIFICANT CHANGE UP (ref 3.8–10.5)

## 2021-09-21 PROCEDURE — 99152 MOD SED SAME PHYS/QHP 5/>YRS: CPT

## 2021-09-21 PROCEDURE — 93458 L HRT ARTERY/VENTRICLE ANGIO: CPT | Mod: 26

## 2021-09-21 PROCEDURE — 99232 SBSQ HOSP IP/OBS MODERATE 35: CPT

## 2021-09-21 PROCEDURE — 99233 SBSQ HOSP IP/OBS HIGH 50: CPT

## 2021-09-21 PROCEDURE — 76937 US GUIDE VASCULAR ACCESS: CPT | Mod: 26

## 2021-09-21 PROCEDURE — 99223 1ST HOSP IP/OBS HIGH 75: CPT

## 2021-09-21 RX ORDER — ALBUTEROL 90 UG/1
2 AEROSOL, METERED ORAL EVERY 6 HOURS
Refills: 0 | Status: DISCONTINUED | OUTPATIENT
Start: 2021-09-21 | End: 2021-09-25

## 2021-09-21 RX ORDER — HEPARIN SODIUM 5000 [USP'U]/ML
INJECTION INTRAVENOUS; SUBCUTANEOUS
Qty: 25000 | Refills: 0 | Status: DISCONTINUED | OUTPATIENT
Start: 2021-09-21 | End: 2021-09-21

## 2021-09-21 RX ORDER — MAGNESIUM SULFATE 500 MG/ML
1 VIAL (ML) INJECTION ONCE
Refills: 0 | Status: COMPLETED | OUTPATIENT
Start: 2021-09-21 | End: 2021-09-21

## 2021-09-21 RX ORDER — RAMIPRIL 5 MG
1 CAPSULE ORAL
Qty: 0 | Refills: 0 | DISCHARGE

## 2021-09-21 RX ORDER — INSULIN DETEMIR 100/ML (3)
50 INSULIN PEN (ML) SUBCUTANEOUS
Qty: 0 | Refills: 0 | DISCHARGE

## 2021-09-21 RX ORDER — INSULIN LISPRO 100/ML
VIAL (ML) SUBCUTANEOUS
Refills: 0 | Status: DISCONTINUED | OUTPATIENT
Start: 2021-09-21 | End: 2021-09-25

## 2021-09-21 RX ORDER — SODIUM CHLORIDE 9 MG/ML
1000 INJECTION INTRAMUSCULAR; INTRAVENOUS; SUBCUTANEOUS
Refills: 0 | Status: DISCONTINUED | OUTPATIENT
Start: 2021-09-21 | End: 2021-09-25

## 2021-09-21 RX ORDER — INSULIN LISPRO 100/ML
18 VIAL (ML) SUBCUTANEOUS
Refills: 0 | Status: DISCONTINUED | OUTPATIENT
Start: 2021-09-21 | End: 2021-09-25

## 2021-09-21 RX ORDER — OXYBUTYNIN CHLORIDE 5 MG
1 TABLET ORAL
Qty: 0 | Refills: 0 | DISCHARGE

## 2021-09-21 RX ORDER — ACETAMINOPHEN 500 MG
650 TABLET ORAL EVERY 6 HOURS
Refills: 0 | Status: DISCONTINUED | OUTPATIENT
Start: 2021-09-21 | End: 2021-09-25

## 2021-09-21 RX ORDER — METOPROLOL TARTRATE 50 MG
1.5 TABLET ORAL
Qty: 0 | Refills: 0 | DISCHARGE

## 2021-09-21 RX ORDER — FUROSEMIDE 40 MG
1 TABLET ORAL
Qty: 0 | Refills: 0 | DISCHARGE

## 2021-09-21 RX ORDER — ROSUVASTATIN CALCIUM 5 MG/1
1 TABLET ORAL
Qty: 0 | Refills: 0 | DISCHARGE

## 2021-09-21 RX ORDER — INSULIN ASPART 100 [IU]/ML
30 INJECTION, SOLUTION SUBCUTANEOUS
Qty: 0 | Refills: 0 | DISCHARGE

## 2021-09-21 RX ORDER — POTASSIUM CHLORIDE 20 MEQ
40 PACKET (EA) ORAL EVERY 4 HOURS
Refills: 0 | Status: COMPLETED | OUTPATIENT
Start: 2021-09-21 | End: 2021-09-21

## 2021-09-21 RX ADMIN — Medication 650 MILLIGRAM(S): at 21:30

## 2021-09-21 RX ADMIN — SODIUM CHLORIDE 3 MILLILITER(S): 9 INJECTION INTRAMUSCULAR; INTRAVENOUS; SUBCUTANEOUS at 05:56

## 2021-09-21 RX ADMIN — Medication 20 MILLIGRAM(S): at 05:25

## 2021-09-21 RX ADMIN — Medication 5 MILLIGRAM(S): at 18:46

## 2021-09-21 RX ADMIN — Medication 5 MILLIGRAM(S): at 05:25

## 2021-09-21 RX ADMIN — Medication 20 MILLIEQUIVALENT(S): at 00:08

## 2021-09-21 RX ADMIN — Medication 6: at 18:41

## 2021-09-21 RX ADMIN — SODIUM CHLORIDE 3 MILLILITER(S): 9 INJECTION INTRAMUSCULAR; INTRAVENOUS; SUBCUTANEOUS at 22:00

## 2021-09-21 RX ADMIN — Medication 18 UNIT(S): at 18:42

## 2021-09-21 RX ADMIN — Medication 35 UNIT(S): at 22:39

## 2021-09-21 RX ADMIN — TICAGRELOR 90 MILLIGRAM(S): 90 TABLET ORAL at 10:07

## 2021-09-21 RX ADMIN — Medication 40 MILLIEQUIVALENT(S): at 09:37

## 2021-09-21 RX ADMIN — Medication 4: at 09:37

## 2021-09-21 RX ADMIN — HEPARIN SODIUM 1000 UNIT(S)/HR: 5000 INJECTION INTRAVENOUS; SUBCUTANEOUS at 01:59

## 2021-09-21 RX ADMIN — Medication 35 UNIT(S): at 11:54

## 2021-09-21 RX ADMIN — Medication 75 MILLIGRAM(S): at 18:50

## 2021-09-21 RX ADMIN — ATORVASTATIN CALCIUM 80 MILLIGRAM(S): 80 TABLET, FILM COATED ORAL at 21:30

## 2021-09-21 RX ADMIN — Medication 40 MILLIEQUIVALENT(S): at 14:22

## 2021-09-21 RX ADMIN — Medication 15 UNIT(S): at 09:37

## 2021-09-21 RX ADMIN — Medication 75 MILLIGRAM(S): at 05:25

## 2021-09-21 RX ADMIN — Medication 200 MILLIGRAM(S): at 23:45

## 2021-09-21 RX ADMIN — Medication 100 GRAM(S): at 09:38

## 2021-09-21 RX ADMIN — SODIUM CHLORIDE 100 MILLILITER(S): 9 INJECTION INTRAMUSCULAR; INTRAVENOUS; SUBCUTANEOUS at 16:37

## 2021-09-21 RX ADMIN — Medication 20 MILLIEQUIVALENT(S): at 01:59

## 2021-09-21 RX ADMIN — Medication 81 MILLIGRAM(S): at 10:59

## 2021-09-21 RX ADMIN — SODIUM CHLORIDE 3 MILLILITER(S): 9 INJECTION INTRAMUSCULAR; INTRAVENOUS; SUBCUTANEOUS at 16:37

## 2021-09-21 RX ADMIN — Medication 650 MILLIGRAM(S): at 22:15

## 2021-09-21 NOTE — CONSULT NOTE ADULT - SUBJECTIVE AND OBJECTIVE BOX
Reason For Consult: DM    HPI:  65 y/o Female hx of ALLEY (on CPAP), CKD, HTN, COVID 19 [2020] HTN, Type 2 DM (on insulin), pulmonary HTN; Pt c/o chest pain since the afternoon. 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 She states the burning sensation went away on its own, but she felt something was "not right". She decided to come to ED for further eval. Has LE edema which is chronic. Denies associated palpitations, lightheadedness or LOC. Reports no fever, chills, cough, SOB, abdominal pain, nausea, vomiting, melena, hematochezia, or dysuria. Patient states she possibly had stress test earlier this year, but not sure.  (20 Sep 2021 20:17)  Endocrine call for diabetes assistance  Patient has a long history of diabetes, takes insulin at home. She follows with endocrine, Dr. Terence junior.  Patient states that A1c was 11 next weeks ago and her endocrinologist adjusted her regimen to Levemir 50 twice a day as well as NovoLog 40 -3 times a day before meals.  Patient states that she was having hypoglycemia and self reduced her NovoLog to 30 before meals.  Patient states she checks her fingersticks at home 3-4 times a day.  She reports that her diet is better controlled more recently and cutting down portions.  A1c was found to be 10.8 inpatient with LDL of 122.  she states she has been diagnosed with potential DR. follows with myke.  Patient states that she is nervous and does not want a full physical exam because she would like to prepare for her cath.    PAST MEDICAL & SURGICAL HISTORY:  Hypertension    Hyperlipemia    Type 2 diabetes mellitus with complication, with long-term current use of insulin    Open-angle glaucoma of left eye, indeterminate stage, unspecified open-angle glaucoma type    CKD (chronic kidney disease) stage 3, GFR 30-59 ml/min    ALLEY (obstructive sleep apnea)  on cpap QHS    Fibroids  history of removal two times    H/O bilateral breast implants  1985        FAMILY HISTORY:  Family history of coronary artery disease        Social History:  retired       Outpatient Medications:  Home Medications:   * Patient Currently Takes Medications as of 20-Sep-2021 20:05 documented in Structured Notes  · 	ramipril 10 mg oral capsule: Last Dose Taken:  , 1 cap(s) orally once a day  · 	oxybutynin 10 mg/24 hr oral tablet, extended release: Last Dose Taken:  , 1 tab(s) orally once a day  · 	Levemir 100 units/mL subcutaneous solution: Last Dose Taken:  , 50 unit(s) subcutaneous 2 times a day  · 	metoprolol succinate 100 mg oral tablet, extended release: Last Dose Taken:  , 1.5 tab(s) orally once a day  · 	NovoLOG FlexPen 100 units/mL injectable solution: Last Dose Taken:  , 30 unit(s) injectable 3 times a day (before meals)  · 	indapamide 1.25 mg oral tablet: Last Dose Taken:  , 1 tab(s) orally once a day (in the morning)  · 	Lasix 20 mg oral tablet: Last Dose Taken:  , 1 tab(s) orally once a day  · 	Crestor 40 mg oral tablet: Last Dose Taken:  , 1 tab(s) orally once a day      MEDICATIONS  (STANDING):  aspirin enteric coated 81 milliGRAM(s) Oral daily  atorvastatin 80 milliGRAM(s) Oral at bedtime  dextrose 40% Gel 15 Gram(s) Oral once  dextrose 5%. 1000 milliLiter(s) (50 mL/Hr) IV Continuous <Continuous>  dextrose 5%. 1000 milliLiter(s) (100 mL/Hr) IV Continuous <Continuous>  dextrose 50% Injectable 25 Gram(s) IV Push once  dextrose 50% Injectable 12.5 Gram(s) IV Push once  dextrose 50% Injectable 25 Gram(s) IV Push once  furosemide    Tablet 20 milliGRAM(s) Oral daily  glucagon  Injectable 1 milliGRAM(s) IntraMuscular once  heparin  Infusion.  Unit(s)/Hr (10 mL/Hr) IV Continuous <Continuous>  insulin detemir injectable (LEVEMIR) 35 Unit(s) SubCutaneous every 12 hours  insulin lispro (ADMELOG) corrective regimen sliding scale   SubCutaneous three times a day before meals  insulin lispro (ADMELOG) corrective regimen sliding scale   SubCutaneous at bedtime  insulin lispro Injectable (ADMELOG) 15 Unit(s) SubCutaneous three times a day before meals  metoprolol tartrate 75 milliGRAM(s) Oral every 12 hours  oxybutynin 5 milliGRAM(s) Oral every 12 hours  sodium chloride 0.9% lock flush 3 milliLiter(s) IV Push every 8 hours  ticagrelor 90 milliGRAM(s) Oral every 12 hours    MEDICATIONS  (PRN):  heparin   Injectable 6000 Unit(s) IV Push every 6 hours PRN For aPTT less than 40      Allergies    No Known Allergies    Intolerances      Review of Systems:  Constitutional: No fever  Eyes: No blurry vision  Neuro: No tremors  HEENT: No pain  Cardiovascular: No chest pain, palpitations  Respiratory: No SOB, no cough  GI: No nausea, vomiting, abdominal pain  : No dysuria  Skin: no rash  Psych: no depression  Endocrine: no polyuria, polydipsia  Hem/lymph: no swelling  Osteoporosis: no fractures    ALL OTHER SYSTEMS REVIEWED AND NEGATIVE        PHYSICAL EXAM:  VITALS: T(C): 36.8 (09-21-21 @ 09:59)  T(F): 98.3 (09-21-21 @ 09:59), Max: 98.7 (09-20-21 @ 17:08)  HR: 82 (09-21-21 @ 09:59) (82 - 91)  BP: 145/65 (09-21-21 @ 09:59) (130/56 - 162/79)  RR:  (16 - 19)  SpO2:  (96% - 98%)  Wt(kg): --  GENERAL: NAD, well-groomed, well-developed  pt declined exam as she states she would like to have time to relax before her procedure     POCT Blood Glucose.: 151 mg/dL (09-21-21 @ 13:32)  POCT Blood Glucose.: 301 mg/dL (09-21-21 @ 11:22)  POCT Blood Glucose.: 305 mg/dL (09-21-21 @ 09:00)  POCT Blood Glucose.: 215 mg/dL (09-20-21 @ 22:03)  POCT Blood Glucose.: 215 mg/dL (09-20-21 @ 13:02)                            13.7   6.89  )-----------( 264      ( 21 Sep 2021 08:34 )             41.3       09-21    139  |  97<L>  |  22  ----------------------------<  332<H>  3.3<L>   |  27  |  1.11    EGFR if : 60  EGFR if non : 52<L>    Ca    10.1      09-21  Mg     1.90     09-21  Phos  2.9     09-21    TPro  7.8  /  Alb  4.4  /  TBili  0.6  /  DBili  x   /  AST  32  /  ALT  20  /  AlkPhos  94  09-20      Thyroid Function Tests:  09-21 @ 08:34 TSH 1.27 FreeT4 -- T3 -- Anti TPO -- Anti Thyroglobulin Ab -- TSI --          09-21 Chol 208<H> Direct LDL -- LDL calculated 122<H> HDL 59 Trig 134    Radiology:

## 2021-09-21 NOTE — PROGRESS NOTE ADULT - SUBJECTIVE AND OBJECTIVE BOX
Patient is a 66y old  Female who presents with a chief complaint of chest pain (21 Sep 2021 08:31)      SUBJECTIVE / OVERNIGHT EVENTS:    Review of Systems:     MEDICATIONS  (STANDING):  aspirin enteric coated 81 milliGRAM(s) Oral daily  atorvastatin 80 milliGRAM(s) Oral at bedtime  dextrose 40% Gel 15 Gram(s) Oral once  dextrose 5%. 1000 milliLiter(s) (50 mL/Hr) IV Continuous <Continuous>  dextrose 5%. 1000 milliLiter(s) (100 mL/Hr) IV Continuous <Continuous>  dextrose 50% Injectable 25 Gram(s) IV Push once  dextrose 50% Injectable 12.5 Gram(s) IV Push once  dextrose 50% Injectable 25 Gram(s) IV Push once  furosemide    Tablet 20 milliGRAM(s) Oral daily  glucagon  Injectable 1 milliGRAM(s) IntraMuscular once  heparin  Infusion.  Unit(s)/Hr (10 mL/Hr) IV Continuous <Continuous>  insulin detemir injectable (LEVEMIR) 35 Unit(s) SubCutaneous every 12 hours  insulin lispro (ADMELOG) corrective regimen sliding scale   SubCutaneous three times a day before meals  insulin lispro (ADMELOG) corrective regimen sliding scale   SubCutaneous at bedtime  insulin lispro Injectable (ADMELOG) 15 Unit(s) SubCutaneous three times a day before meals  metoprolol tartrate 75 milliGRAM(s) Oral every 12 hours  oxybutynin 5 milliGRAM(s) Oral every 12 hours  potassium chloride    Tablet ER 40 milliEquivalent(s) Oral every 4 hours  sodium chloride 0.9% lock flush 3 milliLiter(s) IV Push every 8 hours  ticagrelor 90 milliGRAM(s) Oral every 12 hours    MEDICATIONS  (PRN):  heparin   Injectable 6000 Unit(s) IV Push every 6 hours PRN For aPTT less than 40      PHYSICAL EXAM:    I&O's Summary    ICU Vital Signs Last 24 Hrs  T(C): 36.9 (20 Sep 2021 23:30), Max: 37.1 (20 Sep 2021 17:08)  T(F): 98.5 (20 Sep 2021 23:30), Max: 98.7 (20 Sep 2021 17:08)  HR: 91 (21 Sep 2021 05:21) (82 - 97)  BP: 148/80 (21 Sep 2021 05:21) (130/56 - 162/79)  BP(mean): --  ABP: --  ABP(mean): --  RR: 19 (21 Sep 2021 05:21) (16 - 19)  SpO2: 98% (21 Sep 2021 05:21) (96% - 100%)        LABS:  CAPILLARY BLOOD GLUCOSE      POCT Blood Glucose.: 305 mg/dL (21 Sep 2021 09:00)  POCT Blood Glucose.: 215 mg/dL (20 Sep 2021 22:03)  POCT Blood Glucose.: 215 mg/dL (20 Sep 2021 13:02)                          13.7   6.89  )-----------( 264      ( 21 Sep 2021 08:34 )             41.3     09-21    139  |  97<L>  |  22  ----------------------------<  332<H>  3.3<L>   |  27  |  1.11    Ca    10.1      21 Sep 2021 08:34  Phos  2.9     09-  Mg     1.90     -21    TPro  7.8  /  Alb  4.4  /  TBili  0.6  /  DBili  x   /  AST  32  /  ALT  20  /  AlkPhos  94  09-20    PTT - ( 21 Sep 2021 08:34 )  PTT:58.7 sec  CARDIAC MARKERS ( 21 Sep 2021 08:34 )  x     / x     / 494 U/L / x     / 20.7 ng/mL  CARDIAC MARKERS ( 21 Sep 2021 00:57 )  x     / x     / TNP U/L / x     / 24.0 ng/mL  CARDIAC MARKERS ( 20 Sep 2021 17:38 )  x     / x     / x     / x     / 11.1 ng/mL      Urinalysis Basic - ( 20 Sep 2021 15:49 )    Color: Light Yellow / Appearance: Slightly Turbid / S.017 / pH: x  Gluc: x / Ketone: Negative  / Bili: Negative / Urobili: <2 mg/dL   Blood: x / Protein: 30 mg/dL / Nitrite: Negative   Leuk Esterase: Small / RBC: 1 /HPF / WBC 20 /HPF   Sq Epi: x / Non Sq Epi: 0 /HPF / Bacteria: Moderate        RADIOLOGY & ADDITIONAL TESTS:    Imaging Personally Reviewed:    Consultant(s) Notes Reviewed:      Care Discussed with Consultants/Other Providers: Patient is a 66y old  Female who presents with a chief complaint of chest pain (21 Sep 2021 08:31)      SUBJECTIVE / OVERNIGHT EVENTS: Pt states she feels better. Denies current CP, SOB, n/v/d.    Review of Systems:     MEDICATIONS  (STANDING):  aspirin enteric coated 81 milliGRAM(s) Oral daily  atorvastatin 80 milliGRAM(s) Oral at bedtime  dextrose 40% Gel 15 Gram(s) Oral once  dextrose 5%. 1000 milliLiter(s) (50 mL/Hr) IV Continuous <Continuous>  dextrose 5%. 1000 milliLiter(s) (100 mL/Hr) IV Continuous <Continuous>  dextrose 50% Injectable 25 Gram(s) IV Push once  dextrose 50% Injectable 12.5 Gram(s) IV Push once  dextrose 50% Injectable 25 Gram(s) IV Push once  furosemide    Tablet 20 milliGRAM(s) Oral daily  glucagon  Injectable 1 milliGRAM(s) IntraMuscular once  heparin  Infusion.  Unit(s)/Hr (10 mL/Hr) IV Continuous <Continuous>  insulin detemir injectable (LEVEMIR) 35 Unit(s) SubCutaneous every 12 hours  insulin lispro (ADMELOG) corrective regimen sliding scale   SubCutaneous three times a day before meals  insulin lispro (ADMELOG) corrective regimen sliding scale   SubCutaneous at bedtime  insulin lispro Injectable (ADMELOG) 15 Unit(s) SubCutaneous three times a day before meals  metoprolol tartrate 75 milliGRAM(s) Oral every 12 hours  oxybutynin 5 milliGRAM(s) Oral every 12 hours  potassium chloride    Tablet ER 40 milliEquivalent(s) Oral every 4 hours  sodium chloride 0.9% lock flush 3 milliLiter(s) IV Push every 8 hours  ticagrelor 90 milliGRAM(s) Oral every 12 hours    MEDICATIONS  (PRN):  heparin   Injectable 6000 Unit(s) IV Push every 6 hours PRN For aPTT less than 40      PHYSICAL EXAM:    I&O's Summary    ICU Vital Signs Last 24 Hrs  T(C): 36.9 (20 Sep 2021 23:30), Max: 37.1 (20 Sep 2021 17:08)  T(F): 98.5 (20 Sep 2021 23:30), Max: 98.7 (20 Sep 2021 17:08)  HR: 91 (21 Sep 2021 05:21) (82 - 97)  BP: 148/80 (21 Sep 2021 05:21) (130/56 - 162/79)  BP(mean): --  ABP: --  ABP(mean): --  RR: 19 (21 Sep 2021 05:21) (16 - 19)  SpO2: 98% (21 Sep 2021 05:21) (96% - 100%)    Appearance: Normal	  HEENT:   Normal oral mucosa, EOMI	  Lymphatic: No lymphadenopathy  Cardiovascular: Normal S1 S2, No JVD, No murmurs, No edema  Respiratory: Lungs clear to auscultation	  Psychiatry: A & O x 3, Mood & affect appropriate  Gastrointestinal:  Soft, Non-tender, + BS	  Skin: No rashes, No ecchymoses, No cyanosis	  Neurologic: Non-focal  Extremities: Normal range of motion, No clubbing, cyanosis or edema  Vascular: Peripheral pulses palpable 2+ bilaterally      LABS:  CAPILLARY BLOOD GLUCOSE      POCT Blood Glucose.: 305 mg/dL (21 Sep 2021 09:00)  POCT Blood Glucose.: 215 mg/dL (20 Sep 2021 22:03)  POCT Blood Glucose.: 215 mg/dL (20 Sep 2021 13:02)                          13.7   6.89  )-----------( 264      ( 21 Sep 2021 08:34 )             41.3     09-21    139  |  97<L>  |  22  ----------------------------<  332<H>  3.3<L>   |  27  |  1.11    Ca    10.1      21 Sep 2021 08:34  Phos  2.9     -21  Mg     1.90     09-21    TPro  7.8  /  Alb  4.4  /  TBili  0.6  /  DBili  x   /  AST  32  /  ALT  20  /  AlkPhos  94  09-20    PTT - ( 21 Sep 2021 08:34 )  PTT:58.7 sec  CARDIAC MARKERS ( 21 Sep 2021 08:34 )  x     / x     / 494 U/L / x     / 20.7 ng/mL  CARDIAC MARKERS ( 21 Sep 2021 00:57 )  x     / x     / TNP U/L / x     / 24.0 ng/mL  CARDIAC MARKERS ( 20 Sep 2021 17:38 )  x     / x     / x     / x     / 11.1 ng/mL      Urinalysis Basic - ( 20 Sep 2021 15:49 )    Color: Light Yellow / Appearance: Slightly Turbid / S.017 / pH: x  Gluc: x / Ketone: Negative  / Bili: Negative / Urobili: <2 mg/dL   Blood: x / Protein: 30 mg/dL / Nitrite: Negative   Leuk Esterase: Small / RBC: 1 /HPF / WBC 20 /HPF   Sq Epi: x / Non Sq Epi: 0 /HPF / Bacteria: Moderate        RADIOLOGY & ADDITIONAL TESTS:    Imaging Personally Reviewed:    Consultant(s) Notes Reviewed:      Care Discussed with Consultants/Other Providers:

## 2021-09-21 NOTE — PROGRESS NOTE ADULT - ASSESSMENT
67 y/o Female hx of ALLEY (on CPAP), CKD, HTN, COVID 19 [2020] , Type 2 DM (on insulin), pulmonary HTN a/w NSTEMI,

## 2021-09-21 NOTE — CONSULT NOTE ADULT - ASSESSMENT
5 y/o Female hx of ALLEY (on CPAP), CKD, HTN, COVID 19 [2020] HTN, Type 2 DM (on insulin), pulmonary HTN; Pt c/o chest pain with plan to undergo cardiac cath.  endocrine called for DM assistance given a1c of 10.8      1. DM   check FSBG TID qac and hs  carb consistent diet   - Lantus  u qhs  -Humalog   -low dose SS TIDAC/qhs   5 y/o Female hx of ALLEY (on CPAP), CKD, HTN, COVID 19 [2020] HTN, Type 2 DM (on insulin), pulmonary HTN; Pt c/o chest pain with plan to undergo cardiac cath.  endocrine called for DM assistance given a1c of 10.8      1. DM   check FSBG TID qac and hs  carb consistent diet   - Lantus 35 units BID for now- full effect to be seen tomorrow - (35 given last night and 35 given this am)- will titrate as needed   -Humalog 20-20-20 before meals   -moderate dose SS TIDAC/qhs  -nutrtion consult     dc planning: basal bolus. she is on levemir and novolog at home   (levemir 50 BID and novolog 30 before meals)  she should have outpt followup with her endocrine MD Dr. Terence Alonso for DM medication titration         2. HLD  lipitor 80mg   goal LDL <70   pts LDL is 122      3. HTN  goal BP in DM <130/80  management as per primary team       Kathleen Munoz MD  Attending Physician   Department of Endocrinology, Diabetes and Metabolism     Pager:   [phone] [please provide 10 digit call back number]  JAMES 9-5  [email or web address]  Nights and weekends: [phone]  Please note that this patient may be followed by a different provider tomorrow.   If no answer or after hours, please contact [phone].  For final dc reccomendations, please call [phone] or page the endocrine fellow on call.   7 y/o Female hx of ALLEY (on CPAP), CKD, HTN, COVID 19 [2020] HTN, Type 2 DM (on insulin), pulmonary HTN; Pt c/o chest pain with plan to undergo cardiac cath.  endocrine called for DM assistance given a1c of 10.8      1. DM   check FSBG TID qac and hs  carb consistent diet   - Levemir 35 units BID for now as ordered by primary team - okay to continue, full effect to be seen tomorrow - (35 given last night and 35 given this am)- will titrate as needed   -Humalog 20-20-20 before meals   -moderate dose SS TIDAC/qhs  -nutrtion consult       inform endocrine of hypoglycemia or persistent hyperglycemia episodes as changes in pts insulin regimen will need to be made.   notify endocrine if any plans to be NPO/diet changes as this will also affect insulin regimen.    dc planning: basal bolus. she is on levemir and novolog at home   (levemir 50 BID and novolog 30 before meals)  she should have outpt followup with her endocrine MD Dr. Terence Alonso for DM medication titration         2. HLD  lipitor 80mg   goal LDL <70   pts LDL is 122      3. HTN  goal BP in DM <130/80  management as per primary team       Kathleen Munoz MD  Attending Physician   Department of Endocrinology, Diabetes and Metabolism     Pager:   [phone] [please provide 10 digit call back number]  JAMES 9-5  [email or web address]  Nights and weekends: [phone]  Please note that this patient may be followed by a different provider tomorrow.   If no answer or after hours, please contact [phone].  For final dc reccomendations, please call [phone] or page the endocrine fellow on call.   7 y/o Female hx of ALLEY (on CPAP), CKD, HTN, COVID 19 [2020] HTN, Type 2 DM (on insulin), pulmonary HTN; Pt c/o chest pain with plan to undergo cardiac cath.  endocrine called for DM assistance given a1c of 10.8      1. DM   check FSBG TID qac and hs  carb consistent diet   - Levemir 35 units BID for now as ordered by primary team - okay to continue, full effect to be seen tomorrow - (35 given last night and 35 given this am)- will titrate as needed   -Humalog 18-18-18 before meals   -moderate dose SS TIDAC/qhs  -nutrtion consult       inform endocrine of hypoglycemia or persistent hyperglycemia episodes as changes in pts insulin regimen will need to be made.   notify endocrine if any plans to be NPO/diet changes as this will also affect insulin regimen.    dc planning: basal bolus. she is on levemir and novolog at home   (levemir 50 BID and novolog 30 before meals)  she should have outpt followup with her endocrine MD Dr. Terence Alonso for DM medication titration         2. HLD  lipitor 80mg   goal LDL <70   pts LDL is 122      3. HTN  goal BP in DM <130/80  management as per primary team       Kathleen Munoz MD  Attending Physician   Department of Endocrinology, Diabetes and Metabolism     Pager:   [phone] [please provide 10 digit call back number]  JAMES 9-5  [email or web address]  Nights and weekends: [phone]  Please note that this patient may be followed by a different provider tomorrow.   If no answer or after hours, please contact [phone].  For final dc reccomendations, please call [phone] or page the endocrine fellow on call.

## 2021-09-21 NOTE — PROGRESS NOTE ADULT - SUBJECTIVE AND OBJECTIVE BOX
Date of Admission:09-20-21    24H hour events:     CP free since initiation of DAPT and heparin.    MEDICATIONS:  aspirin enteric coated 81 milliGRAM(s) Oral daily  furosemide    Tablet 20 milliGRAM(s) Oral daily  heparin   Injectable 6000 Unit(s) IV Push every 6 hours PRN  heparin  Infusion.  Unit(s)/Hr IV Continuous <Continuous>  metoprolol tartrate 75 milliGRAM(s) Oral every 12 hours  ticagrelor 90 milliGRAM(s) Oral every 12 hours            atorvastatin 80 milliGRAM(s) Oral at bedtime  dextrose 40% Gel 15 Gram(s) Oral once  dextrose 50% Injectable 25 Gram(s) IV Push once  dextrose 50% Injectable 12.5 Gram(s) IV Push once  dextrose 50% Injectable 25 Gram(s) IV Push once  glucagon  Injectable 1 milliGRAM(s) IntraMuscular once  insulin detemir injectable (LEVEMIR) 35 Unit(s) SubCutaneous every 12 hours  insulin lispro (ADMELOG) corrective regimen sliding scale   SubCutaneous three times a day before meals  insulin lispro (ADMELOG) corrective regimen sliding scale   SubCutaneous at bedtime  insulin lispro Injectable (ADMELOG) 15 Unit(s) SubCutaneous three times a day before meals    dextrose 5%. 1000 milliLiter(s) IV Continuous <Continuous>  dextrose 5%. 1000 milliLiter(s) IV Continuous <Continuous>  oxybutynin 5 milliGRAM(s) Oral every 12 hours  sodium chloride 0.9% lock flush 3 milliLiter(s) IV Push every 8 hours      REVIEW OF SYSTEMS:  Complete 10point ROS negative.    PHYSICAL EXAM:  T(C): 36.9 (09-20-21 @ 23:30), Max: 37.1 (09-20-21 @ 17:08)  HR: 91 (09-21-21 @ 05:21) (82 - 97)  BP: 148/80 (09-21-21 @ 05:21) (130/56 - 162/79)  RR: 19 (09-21-21 @ 05:21) (16 - 19)  SpO2: 98% (09-21-21 @ 05:21) (96% - 100%)  Wt(kg): --  I&O's Summary      Appearance: Normal	  HEENT:   Normal oral mucosa, PERRL, EOMI	  Lymphatic: No lymphadenopathy  Cardiovascular: Normal S1 S2, No JVD, No murmurs, No edema  Respiratory: Lungs clear to auscultation	  Psychiatry: A & O x 3, Mood & affect appropriate  Gastrointestinal:  Soft, Non-tender, + BS	  Skin: No rashes, No ecchymoses, No cyanosis	  Neurologic: Non-focal  Extremities: Normal range of motion, No clubbing, cyanosis or edema  Vascular: Peripheral pulses palpable 2+ bilaterally        LABS:	 	    CBC Full  -  ( 21 Sep 2021 00:57 )  WBC Count : 6.09 K/uL  Hemoglobin : 13.5 g/dL  Hematocrit : 41.8 %  Platelet Count - Automated : 276 K/uL  Mean Cell Volume : 92.7 fL  Mean Cell Hemoglobin : 29.9 pg  Mean Cell Hemoglobin Concentration : 32.3 gm/dL  Auto Neutrophil # : x  Auto Lymphocyte # : x  Auto Monocyte # : x  Auto Eosinophil # : x  Auto Basophil # : x  Auto Neutrophil % : x  Auto Lymphocyte % : x  Auto Monocyte % : x  Auto Eosinophil % : x  Auto Basophil % : x    09-20    x   |  x   |  x   ----------------------------<  x   3.3<L>   |  x   |  x   09-20    144  |  98  |  26<H>  ----------------------------<  220<H>  3.7   |  33<H>  |  1.20    Ca    10.5      20 Sep 2021 15:36  Mg     2.00     09-20    TPro  7.8  /  Alb  4.4  /  TBili  0.6  /  DBili  x   /  AST  32  /  ALT  20  /  AlkPhos  94  09-20      proBNP: Serum Pro-Brain Natriuretic Peptide: 72 pg/mL (09-20 @ 17:38)             	  	  ASSESSMENT/PLAN: 	    1. NSTEMI, type I. ECG relatively benign suggesting LCx territory, although RBBB may be masking some changes. For cath today. Pt knows to notify staff immediately if CP recurs.     2. On appropriate secondary prevention meds.       Cuong Hyman MD, FACC  [id number]

## 2021-09-22 DIAGNOSIS — E78.5 HYPERLIPIDEMIA, UNSPECIFIED: ICD-10-CM

## 2021-09-22 DIAGNOSIS — R79.89 OTHER SPECIFIED ABNORMAL FINDINGS OF BLOOD CHEMISTRY: ICD-10-CM

## 2021-09-22 LAB
ANION GAP SERPL CALC-SCNC: 17 MMOL/L — HIGH (ref 7–14)
BUN SERPL-MCNC: 21 MG/DL — SIGNIFICANT CHANGE UP (ref 7–23)
CALCIUM SERPL-MCNC: 10.4 MG/DL — SIGNIFICANT CHANGE UP (ref 8.4–10.5)
CHLORIDE SERPL-SCNC: 98 MMOL/L — SIGNIFICANT CHANGE UP (ref 98–107)
CO2 SERPL-SCNC: 26 MMOL/L — SIGNIFICANT CHANGE UP (ref 22–31)
CREAT SERPL-MCNC: 1.33 MG/DL — HIGH (ref 0.5–1.3)
GLUCOSE SERPL-MCNC: 147 MG/DL — HIGH (ref 70–99)
HCT VFR BLD CALC: 42.6 % — SIGNIFICANT CHANGE UP (ref 34.5–45)
HGB BLD-MCNC: 14.1 G/DL — SIGNIFICANT CHANGE UP (ref 11.5–15.5)
MAGNESIUM SERPL-MCNC: 2.1 MG/DL — SIGNIFICANT CHANGE UP (ref 1.6–2.6)
MCHC RBC-ENTMCNC: 29.8 PG — SIGNIFICANT CHANGE UP (ref 27–34)
MCHC RBC-ENTMCNC: 33.1 GM/DL — SIGNIFICANT CHANGE UP (ref 32–36)
MCV RBC AUTO: 90.1 FL — SIGNIFICANT CHANGE UP (ref 80–100)
NRBC # BLD: 0 /100 WBCS — SIGNIFICANT CHANGE UP
NRBC # FLD: 0 K/UL — SIGNIFICANT CHANGE UP
PHOSPHATE SERPL-MCNC: 4.3 MG/DL — SIGNIFICANT CHANGE UP (ref 2.5–4.5)
PLATELET # BLD AUTO: 278 K/UL — SIGNIFICANT CHANGE UP (ref 150–400)
POTASSIUM SERPL-MCNC: 3.2 MMOL/L — LOW (ref 3.5–5.3)
POTASSIUM SERPL-SCNC: 3.2 MMOL/L — LOW (ref 3.5–5.3)
RBC # BLD: 4.73 M/UL — SIGNIFICANT CHANGE UP (ref 3.8–5.2)
RBC # FLD: 13.8 % — SIGNIFICANT CHANGE UP (ref 10.3–14.5)
SODIUM SERPL-SCNC: 141 MMOL/L — SIGNIFICANT CHANGE UP (ref 135–145)
WBC # BLD: 6.58 K/UL — SIGNIFICANT CHANGE UP (ref 3.8–10.5)
WBC # FLD AUTO: 6.58 K/UL — SIGNIFICANT CHANGE UP (ref 3.8–10.5)

## 2021-09-22 PROCEDURE — 99232 SBSQ HOSP IP/OBS MODERATE 35: CPT

## 2021-09-22 PROCEDURE — 99233 SBSQ HOSP IP/OBS HIGH 50: CPT

## 2021-09-22 RX ORDER — POTASSIUM CHLORIDE 20 MEQ
40 PACKET (EA) ORAL EVERY 4 HOURS
Refills: 0 | Status: COMPLETED | OUTPATIENT
Start: 2021-09-22 | End: 2021-09-22

## 2021-09-22 RX ORDER — CLOPIDOGREL BISULFATE 75 MG/1
75 TABLET, FILM COATED ORAL DAILY
Refills: 0 | Status: DISCONTINUED | OUTPATIENT
Start: 2021-09-22 | End: 2021-09-25

## 2021-09-22 RX ORDER — LISINOPRIL 2.5 MG/1
10 TABLET ORAL DAILY
Refills: 0 | Status: DISCONTINUED | OUTPATIENT
Start: 2021-09-22 | End: 2021-09-25

## 2021-09-22 RX ADMIN — Medication 2: at 18:13

## 2021-09-22 RX ADMIN — Medication 5 MILLIGRAM(S): at 17:24

## 2021-09-22 RX ADMIN — Medication 650 MILLIGRAM(S): at 09:40

## 2021-09-22 RX ADMIN — Medication 650 MILLIGRAM(S): at 08:40

## 2021-09-22 RX ADMIN — Medication 200 MILLIGRAM(S): at 13:03

## 2021-09-22 RX ADMIN — SODIUM CHLORIDE 3 MILLILITER(S): 9 INJECTION INTRAMUSCULAR; INTRAVENOUS; SUBCUTANEOUS at 21:26

## 2021-09-22 RX ADMIN — Medication 18 UNIT(S): at 18:13

## 2021-09-22 RX ADMIN — Medication 40 MILLIEQUIVALENT(S): at 18:30

## 2021-09-22 RX ADMIN — Medication 1: at 21:50

## 2021-09-22 RX ADMIN — Medication 40 MILLIEQUIVALENT(S): at 13:03

## 2021-09-22 RX ADMIN — Medication 5 MILLIGRAM(S): at 05:24

## 2021-09-22 RX ADMIN — Medication 200 MILLIGRAM(S): at 05:23

## 2021-09-22 RX ADMIN — Medication 35 UNIT(S): at 07:45

## 2021-09-22 RX ADMIN — Medication 75 MILLIGRAM(S): at 05:23

## 2021-09-22 RX ADMIN — Medication 81 MILLIGRAM(S): at 13:04

## 2021-09-22 RX ADMIN — ATORVASTATIN CALCIUM 80 MILLIGRAM(S): 80 TABLET, FILM COATED ORAL at 21:49

## 2021-09-22 RX ADMIN — Medication 75 MILLIGRAM(S): at 17:25

## 2021-09-22 RX ADMIN — Medication 35 UNIT(S): at 18:15

## 2021-09-22 RX ADMIN — Medication 20 MILLIGRAM(S): at 05:24

## 2021-09-22 RX ADMIN — Medication 18 UNIT(S): at 13:04

## 2021-09-22 RX ADMIN — SODIUM CHLORIDE 3 MILLILITER(S): 9 INJECTION INTRAMUSCULAR; INTRAVENOUS; SUBCUTANEOUS at 12:59

## 2021-09-22 RX ADMIN — Medication 200 MILLIGRAM(S): at 21:49

## 2021-09-22 RX ADMIN — Medication 18 UNIT(S): at 09:09

## 2021-09-22 RX ADMIN — SODIUM CHLORIDE 3 MILLILITER(S): 9 INJECTION INTRAMUSCULAR; INTRAVENOUS; SUBCUTANEOUS at 06:00

## 2021-09-22 NOTE — PROGRESS NOTE ADULT - SUBJECTIVE AND OBJECTIVE BOX
Patient is a 66y old  Female who presents with a chief complaint of chest pain (21 Sep 2021 15:33)      SUBJECTIVE / OVERNIGHT EVENTS:    Review of Systems:     MEDICATIONS  (STANDING):  aspirin enteric coated 81 milliGRAM(s) Oral daily  atorvastatin 80 milliGRAM(s) Oral at bedtime  benzonatate 200 milliGRAM(s) Oral every 8 hours  dextrose 40% Gel 15 Gram(s) Oral once  dextrose 5%. 1000 milliLiter(s) (50 mL/Hr) IV Continuous <Continuous>  dextrose 5%. 1000 milliLiter(s) (100 mL/Hr) IV Continuous <Continuous>  dextrose 50% Injectable 25 Gram(s) IV Push once  dextrose 50% Injectable 12.5 Gram(s) IV Push once  dextrose 50% Injectable 25 Gram(s) IV Push once  furosemide    Tablet 20 milliGRAM(s) Oral daily  glucagon  Injectable 1 milliGRAM(s) IntraMuscular once  insulin detemir injectable (LEVEMIR) 35 Unit(s) SubCutaneous every 12 hours  insulin lispro (ADMELOG) corrective regimen sliding scale   SubCutaneous at bedtime  insulin lispro (ADMELOG) corrective regimen sliding scale   SubCutaneous three times a day before meals  insulin lispro Injectable (ADMELOG) 18 Unit(s) SubCutaneous three times a day before meals  metoprolol tartrate 75 milliGRAM(s) Oral every 12 hours  oxybutynin 5 milliGRAM(s) Oral every 12 hours  sodium chloride 0.9% lock flush 3 milliLiter(s) IV Push every 8 hours  sodium chloride 0.9%. 1000 milliLiter(s) (100 mL/Hr) IV Continuous <Continuous>    MEDICATIONS  (PRN):  acetaminophen   Tablet .. 650 milliGRAM(s) Oral every 6 hours PRN Temp greater or equal to 38C (100.4F), Mild Pain (1 - 3), Moderate Pain (4 - 6)  ALBUTerol    90 MICROgram(s) HFA Inhaler 2 Puff(s) Inhalation every 6 hours PRN Shortness of Breath and/or Wheezing      PHYSICAL EXAM:    I&O's Summary    ICU Vital Signs Last 24 Hrs  T(C): 36.7 (22 Sep 2021 05:15), Max: 37.1 (22 Sep 2021 01:15)  T(F): 98.1 (22 Sep 2021 05:15), Max: 98.8 (22 Sep 2021 01:15)  HR: 89 (22 Sep 2021 05:15) (65 - 90)  BP: 148/68 (22 Sep 2021 05:15) (145/65 - 148/68)  BP(mean): --  ABP: --  ABP(mean): --  RR: 16 (22 Sep 2021 05:15) (16 - 18)  SpO2: 100% (22 Sep 2021 05:15) (98% - 100%)    LABS:  CAPILLARY BLOOD GLUCOSE      POCT Blood Glucose.: 145 mg/dL (22 Sep 2021 07:33)  POCT Blood Glucose.: 207 mg/dL (21 Sep 2021 21:49)  POCT Blood Glucose.: 256 mg/dL (21 Sep 2021 18:39)  POCT Blood Glucose.: 152 mg/dL (21 Sep 2021 15:44)  POCT Blood Glucose.: 151 mg/dL (21 Sep 2021 13:32)  POCT Blood Glucose.: 301 mg/dL (21 Sep 2021 11:22)  POCT Blood Glucose.: 305 mg/dL (21 Sep 2021 09:00)                          13.7   6.89  )-----------( 264      ( 21 Sep 2021 08:34 )             41.3     09-21    139  |  97<L>  |  22  ----------------------------<  332<H>  3.3<L>   |  27  |  1.11    Ca    10.1      21 Sep 2021 08:34  Phos  2.9     09-21  Mg     1.90     -21    TPro  7.8  /  Alb  4.4  /  TBili  0.6  /  DBili  x   /  AST  32  /  ALT  20  /  AlkPhos  94  09-20    PTT - ( 21 Sep 2021 08:34 )  PTT:58.7 sec  CARDIAC MARKERS ( 21 Sep 2021 08:34 )  x     / x     / 494 U/L / x     / 20.7 ng/mL  CARDIAC MARKERS ( 21 Sep 2021 00:57 )  x     / x     / TNP U/L / x     / 24.0 ng/mL  CARDIAC MARKERS ( 20 Sep 2021 17:38 )  x     / x     / x     / x     / 11.1 ng/mL      Urinalysis Basic - ( 20 Sep 2021 15:49 )    Color: Light Yellow / Appearance: Slightly Turbid / S.017 / pH: x  Gluc: x / Ketone: Negative  / Bili: Negative / Urobili: <2 mg/dL   Blood: x / Protein: 30 mg/dL / Nitrite: Negative   Leuk Esterase: Small / RBC: 1 /HPF / WBC 20 /HPF   Sq Epi: x / Non Sq Epi: 0 /HPF / Bacteria: Moderate        RADIOLOGY & ADDITIONAL TESTS:    Imaging Personally Reviewed:    Consultant(s) Notes Reviewed:      Care Discussed with Consultants/Other Providers: Patient is a 66y old  Female who presents with a chief complaint of chest pain (21 Sep 2021 15:33)      SUBJECTIVE / OVERNIGHT EVENTS: Denies CP, n/v/d. Ambulating w/o difficulty. States that sometimes she feels like she is "not getting enough air". However, she states that she has been nervous about her over all health     Review of Systems:     MEDICATIONS  (STANDING):  aspirin enteric coated 81 milliGRAM(s) Oral daily  atorvastatin 80 milliGRAM(s) Oral at bedtime  benzonatate 200 milliGRAM(s) Oral every 8 hours  dextrose 40% Gel 15 Gram(s) Oral once  dextrose 5%. 1000 milliLiter(s) (50 mL/Hr) IV Continuous <Continuous>  dextrose 5%. 1000 milliLiter(s) (100 mL/Hr) IV Continuous <Continuous>  dextrose 50% Injectable 25 Gram(s) IV Push once  dextrose 50% Injectable 12.5 Gram(s) IV Push once  dextrose 50% Injectable 25 Gram(s) IV Push once  furosemide    Tablet 20 milliGRAM(s) Oral daily  glucagon  Injectable 1 milliGRAM(s) IntraMuscular once  insulin detemir injectable (LEVEMIR) 35 Unit(s) SubCutaneous every 12 hours  insulin lispro (ADMELOG) corrective regimen sliding scale   SubCutaneous at bedtime  insulin lispro (ADMELOG) corrective regimen sliding scale   SubCutaneous three times a day before meals  insulin lispro Injectable (ADMELOG) 18 Unit(s) SubCutaneous three times a day before meals  metoprolol tartrate 75 milliGRAM(s) Oral every 12 hours  oxybutynin 5 milliGRAM(s) Oral every 12 hours  sodium chloride 0.9% lock flush 3 milliLiter(s) IV Push every 8 hours  sodium chloride 0.9%. 1000 milliLiter(s) (100 mL/Hr) IV Continuous <Continuous>    MEDICATIONS  (PRN):  acetaminophen   Tablet .. 650 milliGRAM(s) Oral every 6 hours PRN Temp greater or equal to 38C (100.4F), Mild Pain (1 - 3), Moderate Pain (4 - 6)  ALBUTerol    90 MICROgram(s) HFA Inhaler 2 Puff(s) Inhalation every 6 hours PRN Shortness of Breath and/or Wheezing      PHYSICAL EXAM:    I&O's Summary    ICU Vital Signs Last 24 Hrs  T(C): 36.7 (22 Sep 2021 05:15), Max: 37.1 (22 Sep 2021 01:15)  T(F): 98.1 (22 Sep 2021 05:15), Max: 98.8 (22 Sep 2021 01:15)  HR: 89 (22 Sep 2021 05:15) (65 - 90)  BP: 148/68 (22 Sep 2021 05:15) (145/65 - 148/68)  BP(mean): --  ABP: --  ABP(mean): --  RR: 16 (22 Sep 2021 05:15) (16 - 18)  SpO2: 100% (22 Sep 2021 05:15) (98% - 100%)  Appearance: Normal	  HEENT:   Normal oral mucosa, EOMI	  Lymphatic: No lymphadenopathy  Cardiovascular: Normal S1 S2,  No murmurs, No edema  Respiratory: Lungs clear to auscultation	  Psychiatry: A & O x 3, Mood & affect appropriate  Gastrointestinal:  Soft, Non-tender, + BS	  Skin: No rashes, No ecchymoses, No cyanosis	  Neurologic: Non-focal  Extremities: Normal range of motion, No clubbing, cyanosis or edema  Vascular: Peripheral pulses palpable 2+ bilaterally  LABS:  CAPILLARY BLOOD GLUCOSE      POCT Blood Glucose.: 145 mg/dL (22 Sep 2021 07:33)  POCT Blood Glucose.: 207 mg/dL (21 Sep 2021 21:49)  POCT Blood Glucose.: 256 mg/dL (21 Sep 2021 18:39)  POCT Blood Glucose.: 152 mg/dL (21 Sep 2021 15:44)  POCT Blood Glucose.: 151 mg/dL (21 Sep 2021 13:32)  POCT Blood Glucose.: 301 mg/dL (21 Sep 2021 11:22)  POCT Blood Glucose.: 305 mg/dL (21 Sep 2021 09:00)                          13.7   6.89  )-----------( 264      ( 21 Sep 2021 08:34 )             41.3     09-21    139  |  97<L>  |  22  ----------------------------<  332<H>  3.3<L>   |  27  |  1.11    Ca    10.1      21 Sep 2021 08:34  Phos  2.9       Mg     1.90         TPro  7.8  /  Alb  4.4  /  TBili  0.6  /  DBili  x   /  AST  32  /  ALT  20  /  AlkPhos  94  09-    PTT - ( 21 Sep 2021 08:34 )  PTT:58.7 sec  CARDIAC MARKERS ( 21 Sep 2021 08:34 )  x     / x     / 494 U/L / x     / 20.7 ng/mL  CARDIAC MARKERS ( 21 Sep 2021 00:57 )  x     / x     / TNP U/L / x     / 24.0 ng/mL  CARDIAC MARKERS ( 20 Sep 2021 17:38 )  x     / x     / x     / x     / 11.1 ng/mL      Urinalysis Basic - ( 20 Sep 2021 15:49 )    Color: Light Yellow / Appearance: Slightly Turbid / S.017 / pH: x  Gluc: x / Ketone: Negative  / Bili: Negative / Urobili: <2 mg/dL   Blood: x / Protein: 30 mg/dL / Nitrite: Negative   Leuk Esterase: Small / RBC: 1 /HPF / WBC 20 /HPF   Sq Epi: x / Non Sq Epi: 0 /HPF / Bacteria: Moderate        RADIOLOGY & ADDITIONAL TESTS:    Imaging Personally Reviewed:    Consultant(s) Notes Reviewed:      Care Discussed with Consultants/Other Providers:

## 2021-09-22 NOTE — PROGRESS NOTE ADULT - ASSESSMENT
Assessment: 66 year old female pmhx f ALLEY (on CPAP), CKD, HTN, COVID 19 [2020] HTN, Type 2 DM (on insulin), pulmonary HTN, s/p Cardiac Catherization presenting with hyperglycemia and A1c: 10.8%.    Problem 1: Type 2 DM   -BG goal 100-180  -Maintain pt on consistent carb diet  -continue with Levemir 35 units BID   -Continue 18 units Admelog premeal tid (hold if NPO/not eating)  -Continue  moderate Admelog dose correctional scale for AC and HS  - FS ac/hs  -Nutrition consult      Discharge planning:  -Anticipate patient to be discharged on basal bolus (Levemir 50 BID/Novolog 30 units before meals)  -May potentially add GLP-1 agonist for better glycemic control if not contraindicated  -Patient states she will make an appointment with a podiatrist and ophthalmologist   -will f/u outpt followup with her endocrine MD Dr. Terence Alonso       Problem 2: HLD  - Continue with lipitor 80mg   -goal LDL <70   -Patient's current LDL is 122      Problem 3: HTN  -goal BP in DM <130/80  -management as per primary team       Yarelis Beltran  Nurse Practitioner  Division of Endocrinology & Diabetes  Pager # [phone]      If after 6PM or before 9AM, or on weekends/holidays, please call endocrine answering service for assistance ([phone]).  For nonurgent matters email [email or web address] for assistance.

## 2021-09-22 NOTE — PROGRESS NOTE ADULT - ASSESSMENT
67 y/o F hx of ALLEY (on CPAP), CKD, HTN, COVID 19 [2020] HTN, type 2 DM on insulin, pulmonary HTN, who presented with chest pain, found to have elevated troponin. C with no significant CAD. Plan for risk factor optimization.     #Chest pain with elevated troponin - C clean, possibly 2/2 microvascular disease.   #HTN  #DM2 - poorly controlled w/ A1c 10.8  #ALLEY on CPAP at home    Plan:  -Resume home Metoprolol succinate, increase 100->150mg QD; if needs better BP control in future, can transition to Coreg as outpatient   -Resume Atorvastatin to 80mg daily (goal LDL 70 in setting of DM2)  -Continue ASA  -Resume home Ramipril (10mg QD) in setting of DM2  -Can stop lasix   -Discussed importance of medication compliance, weight loss, and diet in optimizing ongoing risk factors  -Can follow up with outpatient cardiologist, if any further questions, please contact us.     Thad Coles MD  Cardiology Fellow - PGY 4  For all New Consults and Questions:  [email or web address]   Login: [employer]     67 y/o F hx of ALLEY (on CPAP), CKD, HTN, COVID 19 [2020] HTN, type 2 DM on insulin, pulmonary HTN, who presented with chest pain, found to have elevated troponin. C with no significant CAD. Plan for risk factor optimization.     #Chest pain with elevated troponin - C clean, possibly 2/2 microvascular disease.   #HTN  #DM2 - poorly controlled w/ A1c 10.8  #ALLEY on CPAP at home    Plan:  -Resume home Metoprolol succinate, increase 100->150mg QD; if needs better BP control in future, can transition to Coreg as outpatient   -Resume Atorvastatin to 80mg daily (goal LDL 70 in setting of DM2)  -Continue ASA, add Plavix 75mg QD as well  -Resume home Ramipril (10mg QD) in setting of DM2  -Can stop lasix   -Discussed importance of medication compliance, weight loss, and diet in optimizing ongoing risk factors  -Obtain MRI (looking for other potential etiologies to symptoms and significant troponin elevation)    Thad Coles MD  Cardiology Fellow - PGY 4  For all New Consults and Questions:  [email or web address]   Login: [email or web address]

## 2021-09-22 NOTE — PROGRESS NOTE ADULT - PROBLEM SELECTOR PLAN 10
DVT ppx -Was on hep ggt now dcd given cath results. Pt w/ Improve Score of 1 (for age) as she has been ambulating. No need for pharmacological ppx at this time.     Dispo- Dc planning for today pending final endocrine recs and ordered imaging studies. Further details outlined in discharge documentation. Spent 38 min in discharge planning and coordination. DVT ppx -Was on hep ggt now dcd given cath results. Pt w/ Improve Score of 1 (for age) as she has been ambulating. No need for pharmacological ppx at this time.     Dispo- Dc planning pending cards final recs and pending imaging

## 2021-09-22 NOTE — PROGRESS NOTE ADULT - SUBJECTIVE AND OBJECTIVE BOX
Patient seen and examined at bedside.    Overnight Events: No events overnight. Cath with no obstructive CAD yesterday. Patient without CP or SOB.     Review Of Systems: No chest pain, shortness of breath, or palpitations            Current Meds:  acetaminophen   Tablet .. 650 milliGRAM(s) Oral every 6 hours PRN  ALBUTerol    90 MICROgram(s) HFA Inhaler 2 Puff(s) Inhalation every 6 hours PRN  aspirin enteric coated 81 milliGRAM(s) Oral daily  atorvastatin 80 milliGRAM(s) Oral at bedtime  benzonatate 200 milliGRAM(s) Oral every 8 hours  dextrose 40% Gel 15 Gram(s) Oral once  dextrose 5%. 1000 milliLiter(s) IV Continuous <Continuous>  dextrose 5%. 1000 milliLiter(s) IV Continuous <Continuous>  dextrose 50% Injectable 25 Gram(s) IV Push once  dextrose 50% Injectable 12.5 Gram(s) IV Push once  dextrose 50% Injectable 25 Gram(s) IV Push once  furosemide    Tablet 20 milliGRAM(s) Oral daily  glucagon  Injectable 1 milliGRAM(s) IntraMuscular once  insulin detemir injectable (LEVEMIR) 35 Unit(s) SubCutaneous every 12 hours  insulin lispro (ADMELOG) corrective regimen sliding scale   SubCutaneous at bedtime  insulin lispro (ADMELOG) corrective regimen sliding scale   SubCutaneous three times a day before meals  insulin lispro Injectable (ADMELOG) 18 Unit(s) SubCutaneous three times a day before meals  metoprolol tartrate 75 milliGRAM(s) Oral every 12 hours  oxybutynin 5 milliGRAM(s) Oral every 12 hours  potassium chloride    Tablet ER 40 milliEquivalent(s) Oral every 4 hours  sodium chloride 0.9% lock flush 3 milliLiter(s) IV Push every 8 hours  sodium chloride 0.9%. 1000 milliLiter(s) IV Continuous <Continuous>      Vitals:  T(F): 98.1 (09-22), Max: 98.8 (09-22)  HR: 89 (09-22) (65 - 90)  BP: 148/68 (09-22) (145/66 - 148/68)  RR: 16 (09-22)  SpO2: 100% (09-22)  I&O's Summary      Physical Exam:  Appearance: No acute distress; well appearing  Eyes: EOMI, no scleral icterus   HEENT: Normal oral mucosa  Cardiovascular: RRR, S1, S2, no murmurs, rubs, or gallops; no edema; no JVD  Respiratory: Clear to auscultation bilaterally, no auditory stridor   Gastrointestinal: soft, non-tender, non-distended with normal bowel sounds  Musculoskeletal: No clubbing; no joint deformity   Neurologic: Non-focal  Psychiatry: AAOx3, mood & affect appropriate  Skin: No rashes, ecchymoses, or cyanosis                          14.1   6.58  )-----------( 278      ( 22 Sep 2021 08:45 )             42.6     09-22    141  |  98  |  21  ----------------------------<  147<H>  3.2<L>   |  26  |  1.33<H>    Ca    10.4      22 Sep 2021 08:45  Phos  4.3     09-22  Mg     2.10     09-22    TPro  7.8  /  Alb  4.4  /  TBili  0.6  /  DBili  x   /  AST  32  /  ALT  20  /  AlkPhos  94  09-20    PTT - ( 21 Sep 2021 08:34 )  PTT:58.7 sec  CARDIAC MARKERS ( 21 Sep 2021 08:34 )  933 ng/L / x     / x     / 494 U/L / x     / 20.7 ng/mL  CARDIAC MARKERS ( 21 Sep 2021 00:57 )  305 ng/L / x     / x     / TNP U/L / x     / 24.0 ng/mL  CARDIAC MARKERS ( 20 Sep 2021 17:38 )  x     / x     / x     / x     / x     / 11.1 ng/mL  CARDIAC MARKERS ( 20 Sep 2021 17:34 )  82 ng/L / x     / x     / x     / x     / x      CARDIAC MARKERS ( 20 Sep 2021 15:36 )  54 ng/L / x     / x     / x     / x     / x          Serum Pro-Brain Natriuretic Peptide: 72 pg/mL (09-20 @ 17:38)    New ECG(s): Personally reviewed    Echo:  CONCLUSIONS:  1. Mitral annular calcification, otherwise normal mitral  valve.  2. Calcified trileaflet aortic valve with normal opening.  3. Increased relative wall thickness with normal left  ventricular mass index, consistent with concentric left  ventricular remodeling.  4. Normal left ventricular systolic function. No segmental  wall motion abnormalities.  5. Normal right ventricular size and function.  6. Estimated pulmonary artery systolic pressure equals 42  mm Hg, assuming right atrial pressure equals 10  mm Hg,  consistent with mild pulmonary hypertension.    Cath:  LM:   --  LM: Normal.  LAD:   --  LAD: Normal.  CX:   --  Circumflex: Normal.  RCA:   --  RCA: Normal.  COMPLICATIONS: There were no complications.

## 2021-09-22 NOTE — PROGRESS NOTE ADULT - SUBJECTIVE AND OBJECTIVE BOX
Chief Complaint: Chest pain    History: Denies n/v. Tolerating diet and eating most of meals. Denies recent s/s hypoglycemia. Reports that she will "have better portion control and eat smaller meals" to better control her blood glucose. Expressed apprehension about seeing an ophthalmologist and states she has not seen one since before Covid, but states that she needs to see one soon because she needs to follow up with glaucoma in her left eye.    MEDICATIONS  (STANDING):  aspirin enteric coated 81 milliGRAM(s) Oral daily  atorvastatin 80 milliGRAM(s) Oral at bedtime  benzonatate 200 milliGRAM(s) Oral every 8 hours  dextrose 40% Gel 15 Gram(s) Oral once  dextrose 5%. 1000 milliLiter(s) (50 mL/Hr) IV Continuous <Continuous>  dextrose 5%. 1000 milliLiter(s) (100 mL/Hr) IV Continuous <Continuous>  dextrose 50% Injectable 25 Gram(s) IV Push once  dextrose 50% Injectable 12.5 Gram(s) IV Push once  dextrose 50% Injectable 25 Gram(s) IV Push once  glucagon  Injectable 1 milliGRAM(s) IntraMuscular once  insulin detemir injectable (LEVEMIR) 35 Unit(s) SubCutaneous every 12 hours  insulin lispro (ADMELOG) corrective regimen sliding scale   SubCutaneous at bedtime  insulin lispro (ADMELOG) corrective regimen sliding scale   SubCutaneous three times a day before meals  insulin lispro Injectable (ADMELOG) 18 Unit(s) SubCutaneous three times a day before meals  metoprolol tartrate 75 milliGRAM(s) Oral every 12 hours  oxybutynin 5 milliGRAM(s) Oral every 12 hours  potassium chloride    Tablet ER 40 milliEquivalent(s) Oral every 4 hours  sodium chloride 0.9% lock flush 3 milliLiter(s) IV Push every 8 hours  sodium chloride 0.9%. 1000 milliLiter(s) (100 mL/Hr) IV Continuous <Continuous>    MEDICATIONS  (PRN):  acetaminophen   Tablet .. 650 milliGRAM(s) Oral every 6 hours PRN Temp greater or equal to 38C (100.4F), Mild Pain (1 - 3), Moderate Pain (4 - 6)  ALBUTerol    90 MICROgram(s) HFA Inhaler 2 Puff(s) Inhalation every 6 hours PRN Shortness of Breath and/or Wheezing      Allergies    No Known Allergies    Intolerances      Review of Systems:  Constitutional: No fever  Eyes: No blurry vision  Respiratory: No SOB  CV: No chest pain, palpitations  GI: No nausea, vomiting  Endocrine: no polyuria, polydipsia      ALL OTHER SYSTEMS REVIEWED AND NEGATIVE      PHYSICAL EXAM:  VITALS: T(C): 36.7 (09-22-21 @ 13:07)  T(F): 98 (09-22-21 @ 13:07), Max: 98.8 (09-22-21 @ 01:15)  HR: 85 (09-22-21 @ 13:07) (65 - 90)  BP: 122/66 (09-22-21 @ 13:07) (122/66 - 148/68)  RR:  (16 - 18)  SpO2:  (99% - 100%)    GENERAL: NAD, well-developed  EYES: No proptosis, no lid lag, anicteric  HEENT:  Atraumatic, Normocephalic, moist mucous membraness  SKIN: Dry, intact, No rashes or lesions  MUSCULOSKELETAL: Full range of motion, normal strength  NEURO: extraocular movements intact, no tremor  PSYCH: Alert and oriented x 3, normal affect, normal mood      CAPILLARY BLOOD GLUCOSE      POCT Blood Glucose.: 108 mg/dL (22 Sep 2021 12:42)  POCT Blood Glucose.: 139 mg/dL (22 Sep 2021 08:58)  POCT Blood Glucose.: 145 mg/dL (22 Sep 2021 07:33)  POCT Blood Glucose.: 207 mg/dL (21 Sep 2021 21:49)  POCT Blood Glucose.: 256 mg/dL (21 Sep 2021 18:39)  POCT Blood Glucose.: 152 mg/dL (21 Sep 2021 15:44)      09-22    141  |  98  |  21  ----------------------------<  147<H>  3.2<L>   |  26  |  1.33<H>    EGFR if : 48<L>  EGFR if non : 42<L>    Ca    10.4      09-22  Mg     2.10     09-22  Phos  4.3     09-22    TPro  7.8  /  Alb  4.4  /  TBili  0.6  /  DBili  x   /  AST  32  /  ALT  20  /  AlkPhos  94  09-20          Thyroid Function Tests:  09-21 @ 08:34 TSH 1.27 FreeT4 -- T3 -- Anti TPO -- Anti Thyroglobulin Ab -- TSI --    A1C with Estimated Average Glucose Result: 10.8%    Diet, Regular:   Consistent Carbohydrate Evening Snack (CSTCHOSN)  DASH/TLC Sodium & Cholesterol Restricted (DASH)  Low Fat (LOWFAT)  1200mL Fluid Restriction ([id number])   Special Instructions for Nursing:  Low Fat (LOWFAT) (09-20-21 @ 19:57) [Active]

## 2021-09-23 DIAGNOSIS — R77.8 OTHER SPECIFIED ABNORMALITIES OF PLASMA PROTEINS: ICD-10-CM

## 2021-09-23 LAB
-  AMIKACIN: SIGNIFICANT CHANGE UP
-  AMOXICILLIN/CLAVULANIC ACID: SIGNIFICANT CHANGE UP
-  AMPICILLIN/SULBACTAM: SIGNIFICANT CHANGE UP
-  AMPICILLIN: SIGNIFICANT CHANGE UP
-  AZTREONAM: SIGNIFICANT CHANGE UP
-  CEFAZOLIN: SIGNIFICANT CHANGE UP
-  CEFEPIME: SIGNIFICANT CHANGE UP
-  CEFOXITIN: SIGNIFICANT CHANGE UP
-  CEFTRIAXONE: SIGNIFICANT CHANGE UP
-  CIPROFLOXACIN: SIGNIFICANT CHANGE UP
-  ERTAPENEM: SIGNIFICANT CHANGE UP
-  GENTAMICIN: SIGNIFICANT CHANGE UP
-  IMIPENEM: SIGNIFICANT CHANGE UP
-  LEVOFLOXACIN: SIGNIFICANT CHANGE UP
-  MEROPENEM: SIGNIFICANT CHANGE UP
-  NITROFURANTOIN: SIGNIFICANT CHANGE UP
-  PIPERACILLIN/TAZOBACTAM: SIGNIFICANT CHANGE UP
-  TIGECYCLINE: SIGNIFICANT CHANGE UP
-  TOBRAMYCIN: SIGNIFICANT CHANGE UP
-  TRIMETHOPRIM/SULFAMETHOXAZOLE: SIGNIFICANT CHANGE UP
ANION GAP SERPL CALC-SCNC: 14 MMOL/L — SIGNIFICANT CHANGE UP (ref 7–14)
BASOPHILS # BLD AUTO: 0.05 K/UL — SIGNIFICANT CHANGE UP (ref 0–0.2)
BASOPHILS NFR BLD AUTO: 0.7 % — SIGNIFICANT CHANGE UP (ref 0–2)
BUN SERPL-MCNC: 32 MG/DL — HIGH (ref 7–23)
CALCIUM SERPL-MCNC: 9.6 MG/DL — SIGNIFICANT CHANGE UP (ref 8.4–10.5)
CHLORIDE SERPL-SCNC: 102 MMOL/L — SIGNIFICANT CHANGE UP (ref 98–107)
CO2 SERPL-SCNC: 25 MMOL/L — SIGNIFICANT CHANGE UP (ref 22–31)
CREAT SERPL-MCNC: 1.44 MG/DL — HIGH (ref 0.5–1.3)
CULTURE RESULTS: SIGNIFICANT CHANGE UP
EOSINOPHIL # BLD AUTO: 0.31 K/UL — SIGNIFICANT CHANGE UP (ref 0–0.5)
EOSINOPHIL NFR BLD AUTO: 4.5 % — SIGNIFICANT CHANGE UP (ref 0–6)
GLUCOSE SERPL-MCNC: 114 MG/DL — HIGH (ref 70–99)
HCT VFR BLD CALC: 42 % — SIGNIFICANT CHANGE UP (ref 34.5–45)
HGB BLD-MCNC: 13.8 G/DL — SIGNIFICANT CHANGE UP (ref 11.5–15.5)
IANC: 2.37 K/UL — SIGNIFICANT CHANGE UP (ref 1.5–8.5)
IMM GRANULOCYTES NFR BLD AUTO: 0.3 % — SIGNIFICANT CHANGE UP (ref 0–1.5)
LYMPHOCYTES # BLD AUTO: 3.21 K/UL — SIGNIFICANT CHANGE UP (ref 1–3.3)
LYMPHOCYTES # BLD AUTO: 46.5 % — HIGH (ref 13–44)
MAGNESIUM SERPL-MCNC: 2 MG/DL — SIGNIFICANT CHANGE UP (ref 1.6–2.6)
MCHC RBC-ENTMCNC: 29.6 PG — SIGNIFICANT CHANGE UP (ref 27–34)
MCHC RBC-ENTMCNC: 32.9 GM/DL — SIGNIFICANT CHANGE UP (ref 32–36)
MCV RBC AUTO: 90.1 FL — SIGNIFICANT CHANGE UP (ref 80–100)
METHOD TYPE: SIGNIFICANT CHANGE UP
MONOCYTES # BLD AUTO: 0.95 K/UL — HIGH (ref 0–0.9)
MONOCYTES NFR BLD AUTO: 13.7 % — SIGNIFICANT CHANGE UP (ref 2–14)
NEUTROPHILS # BLD AUTO: 2.37 K/UL — SIGNIFICANT CHANGE UP (ref 1.8–7.4)
NEUTROPHILS NFR BLD AUTO: 34.3 % — LOW (ref 43–77)
NRBC # BLD: 0 /100 WBCS — SIGNIFICANT CHANGE UP
NRBC # FLD: 0 K/UL — SIGNIFICANT CHANGE UP
ORGANISM # SPEC MICROSCOPIC CNT: SIGNIFICANT CHANGE UP
ORGANISM # SPEC MICROSCOPIC CNT: SIGNIFICANT CHANGE UP
PHOSPHATE SERPL-MCNC: 4.1 MG/DL — SIGNIFICANT CHANGE UP (ref 2.5–4.5)
PLATELET # BLD AUTO: 256 K/UL — SIGNIFICANT CHANGE UP (ref 150–400)
POTASSIUM SERPL-MCNC: 4.1 MMOL/L — SIGNIFICANT CHANGE UP (ref 3.5–5.3)
POTASSIUM SERPL-SCNC: 4.1 MMOL/L — SIGNIFICANT CHANGE UP (ref 3.5–5.3)
RBC # BLD: 4.66 M/UL — SIGNIFICANT CHANGE UP (ref 3.8–5.2)
RBC # FLD: 14.1 % — SIGNIFICANT CHANGE UP (ref 10.3–14.5)
SODIUM SERPL-SCNC: 141 MMOL/L — SIGNIFICANT CHANGE UP (ref 135–145)
SPECIMEN SOURCE: SIGNIFICANT CHANGE UP
WBC # BLD: 6.91 K/UL — SIGNIFICANT CHANGE UP (ref 3.8–10.5)
WBC # FLD AUTO: 6.91 K/UL — SIGNIFICANT CHANGE UP (ref 3.8–10.5)

## 2021-09-23 PROCEDURE — 99232 SBSQ HOSP IP/OBS MODERATE 35: CPT

## 2021-09-23 PROCEDURE — 93306 TTE W/DOPPLER COMPLETE: CPT | Mod: 26

## 2021-09-23 PROCEDURE — 93970 EXTREMITY STUDY: CPT | Mod: 26

## 2021-09-23 RX ORDER — METOPROLOL TARTRATE 50 MG
75 TABLET ORAL EVERY 12 HOURS
Refills: 0 | Status: COMPLETED | OUTPATIENT
Start: 2021-09-23 | End: 2021-09-23

## 2021-09-23 RX ORDER — METOPROLOL TARTRATE 50 MG
150 TABLET ORAL DAILY
Refills: 0 | Status: DISCONTINUED | OUTPATIENT
Start: 2021-09-24 | End: 2021-09-25

## 2021-09-23 RX ADMIN — Medication 18 UNIT(S): at 09:21

## 2021-09-23 RX ADMIN — SODIUM CHLORIDE 3 MILLILITER(S): 9 INJECTION INTRAMUSCULAR; INTRAVENOUS; SUBCUTANEOUS at 13:11

## 2021-09-23 RX ADMIN — Medication 35 UNIT(S): at 19:39

## 2021-09-23 RX ADMIN — Medication 650 MILLIGRAM(S): at 06:02

## 2021-09-23 RX ADMIN — Medication 200 MILLIGRAM(S): at 05:40

## 2021-09-23 RX ADMIN — Medication 81 MILLIGRAM(S): at 11:45

## 2021-09-23 RX ADMIN — Medication 2: at 12:51

## 2021-09-23 RX ADMIN — Medication 5 MILLIGRAM(S): at 05:39

## 2021-09-23 RX ADMIN — LISINOPRIL 10 MILLIGRAM(S): 2.5 TABLET ORAL at 05:41

## 2021-09-23 RX ADMIN — SODIUM CHLORIDE 3 MILLILITER(S): 9 INJECTION INTRAMUSCULAR; INTRAVENOUS; SUBCUTANEOUS at 05:16

## 2021-09-23 RX ADMIN — Medication 35 UNIT(S): at 05:50

## 2021-09-23 RX ADMIN — Medication 200 MILLIGRAM(S): at 13:56

## 2021-09-23 RX ADMIN — Medication 18 UNIT(S): at 19:39

## 2021-09-23 RX ADMIN — CLOPIDOGREL BISULFATE 75 MILLIGRAM(S): 75 TABLET, FILM COATED ORAL at 11:45

## 2021-09-23 RX ADMIN — Medication 200 MILLIGRAM(S): at 21:42

## 2021-09-23 RX ADMIN — Medication 2: at 09:20

## 2021-09-23 RX ADMIN — ATORVASTATIN CALCIUM 80 MILLIGRAM(S): 80 TABLET, FILM COATED ORAL at 21:42

## 2021-09-23 RX ADMIN — Medication 650 MILLIGRAM(S): at 06:44

## 2021-09-23 RX ADMIN — Medication 18 UNIT(S): at 12:51

## 2021-09-23 RX ADMIN — Medication 75 MILLIGRAM(S): at 17:42

## 2021-09-23 RX ADMIN — Medication 5 MILLIGRAM(S): at 17:42

## 2021-09-23 RX ADMIN — SODIUM CHLORIDE 3 MILLILITER(S): 9 INJECTION INTRAMUSCULAR; INTRAVENOUS; SUBCUTANEOUS at 22:26

## 2021-09-23 RX ADMIN — Medication 75 MILLIGRAM(S): at 05:39

## 2021-09-23 NOTE — PROGRESS NOTE ADULT - SUBJECTIVE AND OBJECTIVE BOX
Chief Complaint: Chest pain    History: Denies n/v. good appetite.  Denies hypoglycemia.    MEDICATIONS  (STANDING):  aspirin enteric coated 81 milliGRAM(s) Oral daily  atorvastatin 80 milliGRAM(s) Oral at bedtime  benzonatate 200 milliGRAM(s) Oral every 8 hours  dextrose 40% Gel 15 Gram(s) Oral once  dextrose 5%. 1000 milliLiter(s) (50 mL/Hr) IV Continuous <Continuous>  dextrose 5%. 1000 milliLiter(s) (100 mL/Hr) IV Continuous <Continuous>  dextrose 50% Injectable 25 Gram(s) IV Push once  dextrose 50% Injectable 12.5 Gram(s) IV Push once  dextrose 50% Injectable 25 Gram(s) IV Push once  glucagon  Injectable 1 milliGRAM(s) IntraMuscular once  insulin detemir injectable (LEVEMIR) 35 Unit(s) SubCutaneous every 12 hours  insulin lispro (ADMELOG) corrective regimen sliding scale   SubCutaneous at bedtime  insulin lispro (ADMELOG) corrective regimen sliding scale   SubCutaneous three times a day before meals  insulin lispro Injectable (ADMELOG) 18 Unit(s) SubCutaneous three times a day before meals  metoprolol tartrate 75 milliGRAM(s) Oral every 12 hours  oxybutynin 5 milliGRAM(s) Oral every 12 hours  potassium chloride    Tablet ER 40 milliEquivalent(s) Oral every 4 hours  sodium chloride 0.9% lock flush 3 milliLiter(s) IV Push every 8 hours  sodium chloride 0.9%. 1000 milliLiter(s) (100 mL/Hr) IV Continuous <Continuous>    MEDICATIONS  (PRN):  acetaminophen   Tablet .. 650 milliGRAM(s) Oral every 6 hours PRN Temp greater or equal to 38C (100.4F), Mild Pain (1 - 3), Moderate Pain (4 - 6)  ALBUTerol    90 MICROgram(s) HFA Inhaler 2 Puff(s) Inhalation every 6 hours PRN Shortness of Breath and/or Wheezing      Allergies    No Known Allergies    Intolerances      Review of Systems:  Constitutional: No fever  Eyes: No blurry vision  Respiratory: No SOB  CV: No chest pain, palpitations  GI: No nausea, vomiting  Endocrine: no polyuria, polydipsia      ALL OTHER SYSTEMS REVIEWED AND NEGATIVE      PHYSICAL EXAM:  Vital Signs Last 24 Hrs  T(C): 36.8 (23 Sep 2021 17:35), Max: 36.8 (23 Sep 2021 15:08)  T(F): 98.2 (23 Sep 2021 17:35), Max: 98.2 (23 Sep 2021 15:08)  HR: 90 (23 Sep 2021 17:35) (66 - 90)  BP: 117/59 (23 Sep 2021 17:35) (110/60 - 137/66)  BP(mean): --  RR: 18 (23 Sep 2021 17:35) (16 - 18)  SpO2: 98% (23 Sep 2021 17:35) (94% - 100%)  GENERAL: NAD, well-developed  EYES: No proptosis, no lid lag, anicteric  HEENT:  Atraumatic, Normocephalic, moist mucous membraness  SKIN: Dry, intact, No rashes or lesions  MUSCULOSKELETAL: Full range of motion, normal strength  NEURO: extraocular movements intact, no tremor  PSYCH: Alert and oriented x 3, normal affect, normal mood      CAPILLARY BLOOD GLUCOSE    POCT Blood Glucose.: 165 mg/dL (23 Sep 2021 12:36)  POCT Blood Glucose.: 152 mg/dL (23 Sep 2021 08:48)  POCT Blood Glucose.: 141 mg/dL (23 Sep 2021 05:49)  POCT Blood Glucose.: 280 mg/dL (22 Sep 2021 21:32)  POCT Blood Glucose.: 108 mg/dL (22 Sep 2021 12:42)  POCT Blood Glucose.: 139 mg/dL (22 Sep 2021 08:58)  POCT Blood Glucose.: 145 mg/dL (22 Sep 2021 07:33)  POCT Blood Glucose.: 207 mg/dL (21 Sep 2021 21:49)  POCT Blood Glucose.: 256 mg/dL (21 Sep 2021 18:39)  POCT Blood Glucose.: 152 mg/dL (21 Sep 2021 15:44)      09-23    141  |  102  |  32<H>  ----------------------------<  114<H>  4.1   |  25  |  1.44<H>    Ca    9.6      23 Sep 2021 16:39  Phos  4.1     09-23  Mg     2.00     09-23            Thyroid Function Tests:  09-21 @ 08:34 TSH 1.27 FreeT4 -- T3 -- Anti TPO -- Anti Thyroglobulin Ab -- TSI --    A1C with Estimated Average Glucose Result: 10.8%    Diet, Regular:   Consistent Carbohydrate Evening Snack (CSTCHOSN)  DASH/TLC Sodium & Cholesterol Restricted (DASH)  Low Fat (LOWFAT)  1200mL Fluid Restriction ([id number])   Special Instructions for Nursing:  Low Fat (LOWFAT) (09-20-21 @ 19:57) [Active]

## 2021-09-23 NOTE — PROGRESS NOTE ADULT - ASSESSMENT
67 y/o F hx of ALLEY (on CPAP), CKD, HTN, COVID 19 [2020] HTN, type 2 DM on insulin, pulmonary HTN, who presented with chest pain, found to have elevated troponin. [facility] with no significant CAD. Plan for risk factor optimization.     #Chest pain with elevated troponin - C clean, possibly 2/2 microvascular disease.   #HTN  #DM2 - poorly controlled w/ A1c 10.8  #ALLEY on CPAP at home    Plan:  -Resume Metoprolol succinate, increase 150mg QD; if needs better BP control in future, can transition to Coreg as outpatient   -Resume Atorvastatin to 80mg daily (goal LDL 70 in setting of DM2)  -Continue ASA, add Plavix 75mg QD as well  -Resume home Ramipril (10mg QD) or other ACE in setting of DM2  -Can stop lasix   -Discussed importance of medication compliance, weight loss, and diet in optimizing ongoing risk factors  -pending MRI (looking for other potential etiologies to symptoms and significant troponin elevation); will need premedication for anxiety & claustrophobia     Thad Coles MD  Cardiology Fellow - PGY 4  For all New Consults and Questions:  [email or web address]   Login: [employer]

## 2021-09-23 NOTE — PROGRESS NOTE ADULT - PROBLEM SELECTOR PLAN 10
DVT ppx -Pt w/ Improve Score of 1 (for age) as she has been ambulating. No need for pharmacological ppx at this time. If not ambulating will start pharmacological ppx. SCDs     Dispo- Dc planning pending imaging and cards final recs

## 2021-09-23 NOTE — PROGRESS NOTE ADULT - ASSESSMENT
Assessment: 66 year old female pmhx f ALLEY (on CPAP), CKD, HTN, COVID 19 [2020] HTN, Type 2 DM (on insulin), pulmonary HTN, s/p Cardiac Catherization presenting with hyperglycemia and A1c: 10.8%.    Problem 1: Type 2 DM   -BG goal 100-180  -Maintain pt on consistent carb diet  -continue with Levemir 35 units BID   -Continue 18 units Admelog premeal tid (hold if NPO/not eating)  -Continue  moderate Admelog dose correctional scale for AC and HS  - FS ac/hs  -Nutrition consult      Discharge planning:  -Anticipate patient to be discharged on basal bolus (Levemir 50 BID/Novolog 30 units before meals)  -Would cosnider add GLP-1 agonist for better glycemic control however kidney function is declined. If kidney function improves, this can be considered to be started outpatient  -Patient states she will make an appointment with a podiatrist and ophthalmologist   -will f/u outpt followup with her endocrine MD Dr. Terence Alonso       Problem 2: HLD  - Continue with lipitor 80mg   -goal LDL <70   -Patient's current LDL is 122      Problem 3: HTN  -goal BP in DM <130/80  -management as per primary team       Yarelis Beltran  Nurse Practitioner  Division of Endocrinology & Diabetes  Pager # [phone]      If after 6PM or before 9AM, or on weekends/holidays, please call endocrine answering service for assistance ([phone]).  For nonurgent matters email [email or web address] for assistance.

## 2021-09-23 NOTE — PROGRESS NOTE ADULT - SUBJECTIVE AND OBJECTIVE BOX
Patient seen and examined at bedside.    Overnight Events: No events. No CP, no SOB.     Review Of Systems: No chest pain, shortness of breath, or palpitations            Current Meds:  acetaminophen   Tablet .. 650 milliGRAM(s) Oral every 6 hours PRN  ALBUTerol    90 MICROgram(s) HFA Inhaler 2 Puff(s) Inhalation every 6 hours PRN  aspirin enteric coated 81 milliGRAM(s) Oral daily  atorvastatin 80 milliGRAM(s) Oral at bedtime  benzonatate 200 milliGRAM(s) Oral every 8 hours  clopidogrel Tablet 75 milliGRAM(s) Oral daily  dextrose 40% Gel 15 Gram(s) Oral once  dextrose 5%. 1000 milliLiter(s) IV Continuous <Continuous>  dextrose 5%. 1000 milliLiter(s) IV Continuous <Continuous>  dextrose 50% Injectable 25 Gram(s) IV Push once  dextrose 50% Injectable 12.5 Gram(s) IV Push once  dextrose 50% Injectable 25 Gram(s) IV Push once  glucagon  Injectable 1 milliGRAM(s) IntraMuscular once  insulin detemir injectable (LEVEMIR) 35 Unit(s) SubCutaneous every 12 hours  insulin lispro (ADMELOG) corrective regimen sliding scale   SubCutaneous at bedtime  insulin lispro (ADMELOG) corrective regimen sliding scale   SubCutaneous three times a day before meals  insulin lispro Injectable (ADMELOG) 18 Unit(s) SubCutaneous three times a day before meals  lisinopril 10 milliGRAM(s) Oral daily  LORazepam     Tablet 0.5 milliGRAM(s) Oral once  metoprolol tartrate 75 milliGRAM(s) Oral every 12 hours  oxybutynin 5 milliGRAM(s) Oral every 12 hours  sodium chloride 0.9% lock flush 3 milliLiter(s) IV Push every 8 hours  sodium chloride 0.9%. 1000 milliLiter(s) IV Continuous <Continuous>      Vitals:  T(F): 98.1 (09-23), Max: 98.1 (09-23)  HR: 77 (09-23) (77 - 96)  BP: 137/66 (09-23) (113/60 - 137/66)  RR: 16 (09-23)  SpO2: 99% (09-23)  I&O's Summary      Physical Exam:  Appearance: No acute distress; well appearing  Eyes: EOMI, no scleral icterus   HEENT: Normal oral mucosa  Cardiovascular: RRR, S1, S2, no murmurs, rubs, or gallops; no edema; no JVD  Respiratory: Clear to auscultation bilaterally, no auditory stridor   Gastrointestinal: soft, non-tender, non-distended with normal bowel sounds  Musculoskeletal: No clubbing; no joint deformity   Neurologic: Non-focal  Psychiatry: AAOx3, mood & affect appropriate  Skin: No rashes, ecchymoses, or cyanosis                          14.1   6.58  )-----------( 278      ( 22 Sep 2021 08:45 )             42.6     09-22    141  |  98  |  21  ----------------------------<  147<H>  3.2<L>   |  26  |  1.33<H>    Ca    10.4      22 Sep 2021 08:45  Phos  4.3     09-22  Mg     2.10     09-22        CARDIAC MARKERS ( 21 Sep 2021 08:34 )  933 ng/L / x     / x     / 494 U/L / x     / 20.7 ng/mL  CARDIAC MARKERS ( 21 Sep 2021 00:57 )  305 ng/L / x     / x     / TNP U/L / x     / 24.0 ng/mL  CARDIAC MARKERS ( 20 Sep 2021 17:38 )  x     / x     / x     / x     / x     / 11.1 ng/mL  CARDIAC MARKERS ( 20 Sep 2021 17:34 )  82 ng/L / x     / x     / x     / x     / x      CARDIAC MARKERS ( 20 Sep 2021 15:36 )  54 ng/L / x     / x     / x     / x     / x          Serum Pro-Brain Natriuretic Peptide: 72 pg/mL (09-20 @ 17:38)      New ECG(s): Personally reviewed    Echo:  CONCLUSIONS:  1. Mitral annular calcification, otherwise normal mitral  valve.  2. Calcified trileaflet aortic valve with normal opening.  3. Increased relative wall thickness with normal left  ventricular mass index, consistent with concentric left  ventricular remodeling.  4. Normal left ventricular systolic function. No segmental  wall motion abnormalities.  5. Normal right ventricular size and function.  6. Estimated pulmonary artery systolic pressure equals 42  mm Hg, assuming right atrial pressure equals 10  mm Hg,  consistent with mild pulmonary hypertension.    Cath:  LM:   --  LM: Normal.  LAD:   --  LAD: Normal.  CX:   --  Circumflex: Normal.  RCA:   --  RCA: Normal.  COMPLICATIONS: There were no complications.

## 2021-09-23 NOTE — PROGRESS NOTE ADULT - SUBJECTIVE AND OBJECTIVE BOX
Patient is a 66y old  Female who presents with a chief complaint of chest pain (22 Sep 2021 15:10)      SUBJECTIVE / OVERNIGHT EVENTS:    Review of Systems:     MEDICATIONS  (STANDING):  aspirin enteric coated 81 milliGRAM(s) Oral daily  atorvastatin 80 milliGRAM(s) Oral at bedtime  benzonatate 200 milliGRAM(s) Oral every 8 hours  clopidogrel Tablet 75 milliGRAM(s) Oral daily  dextrose 40% Gel 15 Gram(s) Oral once  dextrose 5%. 1000 milliLiter(s) (50 mL/Hr) IV Continuous <Continuous>  dextrose 5%. 1000 milliLiter(s) (100 mL/Hr) IV Continuous <Continuous>  dextrose 50% Injectable 25 Gram(s) IV Push once  dextrose 50% Injectable 12.5 Gram(s) IV Push once  dextrose 50% Injectable 25 Gram(s) IV Push once  glucagon  Injectable 1 milliGRAM(s) IntraMuscular once  insulin detemir injectable (LEVEMIR) 35 Unit(s) SubCutaneous every 12 hours  insulin lispro (ADMELOG) corrective regimen sliding scale   SubCutaneous at bedtime  insulin lispro (ADMELOG) corrective regimen sliding scale   SubCutaneous three times a day before meals  insulin lispro Injectable (ADMELOG) 18 Unit(s) SubCutaneous three times a day before meals  lisinopril 10 milliGRAM(s) Oral daily  metoprolol tartrate 75 milliGRAM(s) Oral every 12 hours  oxybutynin 5 milliGRAM(s) Oral every 12 hours  sodium chloride 0.9% lock flush 3 milliLiter(s) IV Push every 8 hours  sodium chloride 0.9%. 1000 milliLiter(s) (100 mL/Hr) IV Continuous <Continuous>    MEDICATIONS  (PRN):  acetaminophen   Tablet .. 650 milliGRAM(s) Oral every 6 hours PRN Temp greater or equal to 38C (100.4F), Mild Pain (1 - 3), Moderate Pain (4 - 6)  ALBUTerol    90 MICROgram(s) HFA Inhaler 2 Puff(s) Inhalation every 6 hours PRN Shortness of Breath and/or Wheezing      PHYSICAL EXAM:    ICU Vital Signs Last 24 Hrs  T(C): 36.7 (23 Sep 2021 05:36), Max: 36.7 (22 Sep 2021 13:07)  T(F): 98.1 (23 Sep 2021 05:36), Max: 98.1 (23 Sep 2021 05:36)  HR: 77 (23 Sep 2021 07:58) (77 - 96)  BP: 137/66 (23 Sep 2021 05:36) (113/60 - 137/66)  BP(mean): --  ABP: --  ABP(mean): --  RR: 16 (23 Sep 2021 05:36) (16 - 17)  SpO2: 99% (23 Sep 2021 05:36) (97% - 100%)      LABS:  CAPILLARY BLOOD GLUCOSE      POCT Blood Glucose.: 141 mg/dL (23 Sep 2021 05:49)  POCT Blood Glucose.: 280 mg/dL (22 Sep 2021 21:32)  POCT Blood Glucose.: 188 mg/dL (22 Sep 2021 17:50)  POCT Blood Glucose.: 108 mg/dL (22 Sep 2021 12:42)  POCT Blood Glucose.: 139 mg/dL (22 Sep 2021 08:58)                          14.1   6.58  )-----------( 278      ( 22 Sep 2021 08:45 )             42.6     09-22    141  |  98  |  21  ----------------------------<  147<H>  3.2<L>   |  26  |  1.33<H>    Ca    10.4      22 Sep 2021 08:45  Phos  4.3     09-22  Mg     2.10     09-22                RADIOLOGY & ADDITIONAL TESTS:    Imaging Personally Reviewed:    Consultant(s) Notes Reviewed:      Care Discussed with Consultants/Other Providers: Patient is a 66y old  Female who presents with a chief complaint of chest pain (22 Sep 2021 15:10)      SUBJECTIVE / OVERNIGHT EVENTS: Denies CP, n/v. Having BMs. Reports mild SOB w/ ambulation     Review of Systems:     MEDICATIONS  (STANDING):  aspirin enteric coated 81 milliGRAM(s) Oral daily  atorvastatin 80 milliGRAM(s) Oral at bedtime  benzonatate 200 milliGRAM(s) Oral every 8 hours  clopidogrel Tablet 75 milliGRAM(s) Oral daily  dextrose 40% Gel 15 Gram(s) Oral once  dextrose 5%. 1000 milliLiter(s) (50 mL/Hr) IV Continuous <Continuous>  dextrose 5%. 1000 milliLiter(s) (100 mL/Hr) IV Continuous <Continuous>  dextrose 50% Injectable 25 Gram(s) IV Push once  dextrose 50% Injectable 12.5 Gram(s) IV Push once  dextrose 50% Injectable 25 Gram(s) IV Push once  glucagon  Injectable 1 milliGRAM(s) IntraMuscular once  insulin detemir injectable (LEVEMIR) 35 Unit(s) SubCutaneous every 12 hours  insulin lispro (ADMELOG) corrective regimen sliding scale   SubCutaneous at bedtime  insulin lispro (ADMELOG) corrective regimen sliding scale   SubCutaneous three times a day before meals  insulin lispro Injectable (ADMELOG) 18 Unit(s) SubCutaneous three times a day before meals  lisinopril 10 milliGRAM(s) Oral daily  metoprolol tartrate 75 milliGRAM(s) Oral every 12 hours  oxybutynin 5 milliGRAM(s) Oral every 12 hours  sodium chloride 0.9% lock flush 3 milliLiter(s) IV Push every 8 hours  sodium chloride 0.9%. 1000 milliLiter(s) (100 mL/Hr) IV Continuous <Continuous>    MEDICATIONS  (PRN):  acetaminophen   Tablet .. 650 milliGRAM(s) Oral every 6 hours PRN Temp greater or equal to 38C (100.4F), Mild Pain (1 - 3), Moderate Pain (4 - 6)  ALBUTerol    90 MICROgram(s) HFA Inhaler 2 Puff(s) Inhalation every 6 hours PRN Shortness of Breath and/or Wheezing      PHYSICAL EXAM:    ICU Vital Signs Last 24 Hrs  T(C): 36.7 (23 Sep 2021 05:36), Max: 36.7 (22 Sep 2021 13:07)  T(F): 98.1 (23 Sep 2021 05:36), Max: 98.1 (23 Sep 2021 05:36)  HR: 77 (23 Sep 2021 07:58) (77 - 96)  BP: 137/66 (23 Sep 2021 05:36) (113/60 - 137/66)  BP(mean): --  ABP: --  ABP(mean): --  RR: 16 (23 Sep 2021 05:36) (16 - 17)  SpO2: 99% (23 Sep 2021 05:36) (97% - 100%)    Appearance: Normal	  HEENT:   Normal oral mucosa, EOMI	  Lymphatic: No lymphadenopathy  Cardiovascular: Normal S1 S2,  No murmurs, No edema  Respiratory: Lungs clear to auscultation	  Psychiatry: A & O x 3, Mood & affect appropriate  Gastrointestinal:  Soft, Non-tender, + BS	  Skin: No rashes, No ecchymoses, No cyanosis	  Neurologic: Non-focal  Extremities: Normal range of motion, No clubbing, cyanosis or edema  Vascular: Peripheral pulses palpable 2+ bilaterally  LABS:  CAPILLARY BLOOD GLUCOSE      POCT Blood Glucose.: 141 mg/dL (23 Sep 2021 05:49)  POCT Blood Glucose.: 280 mg/dL (22 Sep 2021 21:32)  POCT Blood Glucose.: 188 mg/dL (22 Sep 2021 17:50)  POCT Blood Glucose.: 108 mg/dL (22 Sep 2021 12:42)  POCT Blood Glucose.: 139 mg/dL (22 Sep 2021 08:58)                          14.1   6.58  )-----------( 278      ( 22 Sep 2021 08:45 )             42.6     09-22    141  |  98  |  21  ----------------------------<  147<H>  3.2<L>   |  26  |  1.33<H>    Ca    10.4      22 Sep 2021 08:45  Phos  4.3     09-22  Mg     2.10     09-22                RADIOLOGY & ADDITIONAL TESTS:    Imaging Personally Reviewed:    Consultant(s) Notes Reviewed:      Care Discussed with Consultants/Other Providers:

## 2021-09-23 NOTE — PROGRESS NOTE ADULT - ASSESSMENT
67 y/o Female hx of ALLEY (on CPAP), CKD, HTN, COVID 19 [2020] , Type 2 DM (on insulin), pulmonary HTN a/w CP and elevated Hst, w/ concern for NSTEMI. However, LHC with no significant CAD.

## 2021-09-24 LAB
ANION GAP SERPL CALC-SCNC: 17 MMOL/L — HIGH (ref 7–14)
APPEARANCE UR: CLEAR — SIGNIFICANT CHANGE UP
BACTERIA # UR AUTO: ABNORMAL
BASOPHILS # BLD AUTO: 0.06 K/UL — SIGNIFICANT CHANGE UP (ref 0–0.2)
BASOPHILS NFR BLD AUTO: 0.8 % — SIGNIFICANT CHANGE UP (ref 0–2)
BILIRUB UR-MCNC: NEGATIVE — SIGNIFICANT CHANGE UP
BUN SERPL-MCNC: 31 MG/DL — HIGH (ref 7–23)
CALCIUM SERPL-MCNC: 10.2 MG/DL — SIGNIFICANT CHANGE UP (ref 8.4–10.5)
CHLORIDE SERPL-SCNC: 102 MMOL/L — SIGNIFICANT CHANGE UP (ref 98–107)
CK MB BLD-MCNC: 1 % — SIGNIFICANT CHANGE UP (ref 0–2.5)
CK MB CFR SERPL CALC: 2 NG/ML — SIGNIFICANT CHANGE UP
CK SERPL-CCNC: 199 U/L — HIGH (ref 25–170)
CO2 SERPL-SCNC: 21 MMOL/L — LOW (ref 22–31)
COLOR SPEC: YELLOW — SIGNIFICANT CHANGE UP
CREAT SERPL-MCNC: 1.33 MG/DL — HIGH (ref 0.5–1.3)
DIFF PNL FLD: NEGATIVE — SIGNIFICANT CHANGE UP
EOSINOPHIL # BLD AUTO: 0.39 K/UL — SIGNIFICANT CHANGE UP (ref 0–0.5)
EOSINOPHIL NFR BLD AUTO: 5 % — SIGNIFICANT CHANGE UP (ref 0–6)
EPI CELLS # UR: 1 /HPF — SIGNIFICANT CHANGE UP (ref 0–5)
GLUCOSE SERPL-MCNC: 127 MG/DL — HIGH (ref 70–99)
GLUCOSE UR QL: NEGATIVE — SIGNIFICANT CHANGE UP
HCT VFR BLD CALC: 40.1 % — SIGNIFICANT CHANGE UP (ref 34.5–45)
HGB BLD-MCNC: 13.3 G/DL — SIGNIFICANT CHANGE UP (ref 11.5–15.5)
HYALINE CASTS # UR AUTO: 2 /LPF — SIGNIFICANT CHANGE UP (ref 0–7)
IANC: 2.6 K/UL — SIGNIFICANT CHANGE UP (ref 1.5–8.5)
IMM GRANULOCYTES NFR BLD AUTO: 0.1 % — SIGNIFICANT CHANGE UP (ref 0–1.5)
KETONES UR-MCNC: NEGATIVE — SIGNIFICANT CHANGE UP
LEUKOCYTE ESTERASE UR-ACNC: ABNORMAL
LYMPHOCYTES # BLD AUTO: 3.8 K/UL — HIGH (ref 1–3.3)
LYMPHOCYTES # BLD AUTO: 48.7 % — HIGH (ref 13–44)
MAGNESIUM SERPL-MCNC: 2.2 MG/DL — SIGNIFICANT CHANGE UP (ref 1.6–2.6)
MCHC RBC-ENTMCNC: 30 PG — SIGNIFICANT CHANGE UP (ref 27–34)
MCHC RBC-ENTMCNC: 33.2 GM/DL — SIGNIFICANT CHANGE UP (ref 32–36)
MCV RBC AUTO: 90.3 FL — SIGNIFICANT CHANGE UP (ref 80–100)
MONOCYTES # BLD AUTO: 0.94 K/UL — HIGH (ref 0–0.9)
MONOCYTES NFR BLD AUTO: 12.1 % — SIGNIFICANT CHANGE UP (ref 2–14)
NEUTROPHILS # BLD AUTO: 2.6 K/UL — SIGNIFICANT CHANGE UP (ref 1.8–7.4)
NEUTROPHILS NFR BLD AUTO: 33.3 % — LOW (ref 43–77)
NITRITE UR-MCNC: POSITIVE
NRBC # BLD: 0 /100 WBCS — SIGNIFICANT CHANGE UP
NRBC # FLD: 0 K/UL — SIGNIFICANT CHANGE UP
NT-PROBNP SERPL-SCNC: 62 PG/ML — SIGNIFICANT CHANGE UP
PH UR: 6 — SIGNIFICANT CHANGE UP (ref 5–8)
PLATELET # BLD AUTO: 255 K/UL — SIGNIFICANT CHANGE UP (ref 150–400)
POTASSIUM SERPL-MCNC: 4.1 MMOL/L — SIGNIFICANT CHANGE UP (ref 3.5–5.3)
POTASSIUM SERPL-SCNC: 4.1 MMOL/L — SIGNIFICANT CHANGE UP (ref 3.5–5.3)
PROT UR-MCNC: ABNORMAL
RBC # BLD: 4.44 M/UL — SIGNIFICANT CHANGE UP (ref 3.8–5.2)
RBC # FLD: 14 % — SIGNIFICANT CHANGE UP (ref 10.3–14.5)
RBC CASTS # UR COMP ASSIST: 0 /HPF — SIGNIFICANT CHANGE UP (ref 0–4)
SODIUM SERPL-SCNC: 140 MMOL/L — SIGNIFICANT CHANGE UP (ref 135–145)
SP GR SPEC: 1.02 — SIGNIFICANT CHANGE UP (ref 1–1.05)
TROPONIN T, HIGH SENSITIVITY RESULT: 429 NG/L — CRITICAL HIGH
UROBILINOGEN FLD QL: SIGNIFICANT CHANGE UP
WBC # BLD: 7.8 K/UL — SIGNIFICANT CHANGE UP (ref 3.8–10.5)
WBC # FLD AUTO: 7.8 K/UL — SIGNIFICANT CHANGE UP (ref 3.8–10.5)
WBC UR QL: 35 /HPF — HIGH (ref 0–5)

## 2021-09-24 PROCEDURE — 75561 CARDIAC MRI FOR MORPH W/DYE: CPT | Mod: 26

## 2021-09-24 PROCEDURE — 99232 SBSQ HOSP IP/OBS MODERATE 35: CPT

## 2021-09-24 RX ADMIN — SODIUM CHLORIDE 3 MILLILITER(S): 9 INJECTION INTRAMUSCULAR; INTRAVENOUS; SUBCUTANEOUS at 14:45

## 2021-09-24 RX ADMIN — Medication 200 MILLIGRAM(S): at 13:38

## 2021-09-24 RX ADMIN — Medication 18 UNIT(S): at 18:15

## 2021-09-24 RX ADMIN — Medication 150 MILLIGRAM(S): at 05:53

## 2021-09-24 RX ADMIN — Medication 35 UNIT(S): at 05:49

## 2021-09-24 RX ADMIN — Medication 81 MILLIGRAM(S): at 12:59

## 2021-09-24 RX ADMIN — Medication 18 UNIT(S): at 12:59

## 2021-09-24 RX ADMIN — Medication 0.5 MILLIGRAM(S): at 10:04

## 2021-09-24 RX ADMIN — Medication 4: at 18:15

## 2021-09-24 RX ADMIN — Medication 30 MILLILITER(S): at 17:33

## 2021-09-24 RX ADMIN — ATORVASTATIN CALCIUM 80 MILLIGRAM(S): 80 TABLET, FILM COATED ORAL at 21:50

## 2021-09-24 RX ADMIN — SODIUM CHLORIDE 3 MILLILITER(S): 9 INJECTION INTRAMUSCULAR; INTRAVENOUS; SUBCUTANEOUS at 22:11

## 2021-09-24 RX ADMIN — Medication 35 UNIT(S): at 18:17

## 2021-09-24 RX ADMIN — Medication 200 MILLIGRAM(S): at 21:51

## 2021-09-24 RX ADMIN — Medication 200 MILLIGRAM(S): at 05:50

## 2021-09-24 RX ADMIN — CLOPIDOGREL BISULFATE 75 MILLIGRAM(S): 75 TABLET, FILM COATED ORAL at 12:59

## 2021-09-24 RX ADMIN — LISINOPRIL 10 MILLIGRAM(S): 2.5 TABLET ORAL at 05:50

## 2021-09-24 RX ADMIN — Medication 5 MILLIGRAM(S): at 05:50

## 2021-09-24 RX ADMIN — SODIUM CHLORIDE 3 MILLILITER(S): 9 INJECTION INTRAMUSCULAR; INTRAVENOUS; SUBCUTANEOUS at 05:42

## 2021-09-24 RX ADMIN — Medication 5 MILLIGRAM(S): at 17:34

## 2021-09-24 RX ADMIN — Medication 18 UNIT(S): at 09:04

## 2021-09-24 NOTE — PROGRESS NOTE ADULT - ASSESSMENT
67 y/o F hx of ALLEY (on CPAP), CKD, HTN, COVID 19 [2020] HTN, type 2 DM on insulin, pulmonary HTN, who presented with chest pain, found to have elevated troponin. C with no significant CAD. Plan for risk factor optimization.     #Chest pain with elevated troponin - C clean, possibly 2/2 microvascular disease.   #HTN  #DM2 - poorly controlled w/ A1c 10.8  #ALLEY on CPAP at home    Plan:  -Resume Metoprolol succinate 150mg QD; if needs better BP control in future, can transition to Coreg as outpatient   -Resume Atorvastatin to 80mg daily (goal LDL 70 in setting of DM2)  -Continue ASA, add Plavix 75mg QD as well  -Resume home Ramipril (10mg QD) or other ACE in setting of DM2  -Discussed importance of medication compliance, weight loss, and diet in optimizing ongoing risk factors  -pending cardiac MRI (looking for other potential etiologies to symptoms and significant troponin elevation)    Thad Coles MD  Cardiology Fellow - PGY 4  For all New Consults and Questions:  [email or web address]   Login: [employer]

## 2021-09-24 NOTE — PROGRESS NOTE ADULT - SUBJECTIVE AND OBJECTIVE BOX
Patient is a 66y old  Female who presents with a chief complaint of chest pain (24 Sep 2021 10:42)      SUBJECTIVE / OVERNIGHT EVENTS: No events overnight. Pt denies cp, lightheadedness, sob, palpitations.    MEDICATIONS  (STANDING):  aspirin enteric coated 81 milliGRAM(s) Oral daily  atorvastatin 80 milliGRAM(s) Oral at bedtime  benzonatate 200 milliGRAM(s) Oral every 8 hours  clopidogrel Tablet 75 milliGRAM(s) Oral daily  dextrose 40% Gel 15 Gram(s) Oral once  dextrose 5%. 1000 milliLiter(s) (50 mL/Hr) IV Continuous <Continuous>  dextrose 5%. 1000 milliLiter(s) (100 mL/Hr) IV Continuous <Continuous>  dextrose 50% Injectable 25 Gram(s) IV Push once  dextrose 50% Injectable 12.5 Gram(s) IV Push once  dextrose 50% Injectable 25 Gram(s) IV Push once  glucagon  Injectable 1 milliGRAM(s) IntraMuscular once  insulin detemir injectable (LEVEMIR) 35 Unit(s) SubCutaneous every 12 hours  insulin lispro (ADMELOG) corrective regimen sliding scale   SubCutaneous at bedtime  insulin lispro (ADMELOG) corrective regimen sliding scale   SubCutaneous three times a day before meals  insulin lispro Injectable (ADMELOG) 18 Unit(s) SubCutaneous three times a day before meals  lisinopril 10 milliGRAM(s) Oral daily  metoprolol succinate  milliGRAM(s) Oral daily  oxybutynin 5 milliGRAM(s) Oral every 12 hours  sodium chloride 0.9% lock flush 3 milliLiter(s) IV Push every 8 hours  sodium chloride 0.9%. 1000 milliLiter(s) (100 mL/Hr) IV Continuous <Continuous>    MEDICATIONS  (PRN):  acetaminophen   Tablet .. 650 milliGRAM(s) Oral every 6 hours PRN Temp greater or equal to 38C (100.4F), Mild Pain (1 - 3), Moderate Pain (4 - 6)  ALBUTerol    90 MICROgram(s) HFA Inhaler 2 Puff(s) Inhalation every 6 hours PRN Shortness of Breath and/or Wheezing        CAPILLARY BLOOD GLUCOSE      POCT Blood Glucose.: 116 mg/dL (24 Sep 2021 12:32)  POCT Blood Glucose.: 127 mg/dL (24 Sep 2021 08:16)  POCT Blood Glucose.: 124 mg/dL (24 Sep 2021 05:47)  POCT Blood Glucose.: 132 mg/dL (23 Sep 2021 21:38)  POCT Blood Glucose.: 113 mg/dL (23 Sep 2021 19:33)  POCT Blood Glucose.: 165 mg/dL (23 Sep 2021 12:36)    I&O's Summary    23 Sep 2021 07:01  -  24 Sep 2021 07:00  --------------------------------------------------------  IN: 420 mL / OUT: 500 mL / NET: -80 mL    Vital Signs Last 24 Hrs  T(C): 36.9 (09-24-21 @ 05:45), Max: 36.9 (09-24-21 @ 05:45)  T(F): 98.4 (09-24-21 @ 05:45), Max: 98.4 (09-24-21 @ 05:45)  HR: 89 (09-24-21 @ 05:45) (66 - 90)  BP: 126/64 (09-24-21 @ 05:45) (110/60 - 126/64)  BP(mean): --  RR: 18 (09-24-21 @ 05:45) (16 - 18)  SpO2: 96% (09-24-21 @ 05:45) (94% - 98%)      PHYSICAL EXAM:  GENERAL: NAD, well-developed  HEAD:  Atraumatic, Normocephalic  CHEST/LUNG: Clear to auscultation bilaterally; No wheeze  HEART: Regular rate and rhythm; No murmurs, rubs, or gallops  ABDOMEN: Soft, Nontender, Nondistended; Bowel sounds present  EXTREMITIES:  2+ Peripheral Pulses, No clubbing, cyanosis, or edema  PSYCH: AAOx3  NEUROLOGY: non-focal    LABS:                        13.3   7.80  )-----------( 255      ( 24 Sep 2021 07:53 )             40.1     09-24    140  |  102  |  31<H>  ----------------------------<  127<H>  4.1   |  21<L>  |  1.33<H>    Ca    10.2      24 Sep 2021 07:53  Phos  4.1     09-23  Mg     2.20     09-24                RADIOLOGY & ADDITIONAL TESTS:    Imaging Personally Reviewed:    Consultant(s) Notes Reviewed:      Care Discussed with Consultants/Other Providers:

## 2021-09-24 NOTE — PROGRESS NOTE ADULT - SUBJECTIVE AND OBJECTIVE BOX
Patient seen and examined at bedside.    Overnight Events: No events.    Review Of Systems: No chest pain, shortness of breath, or palpitations            Current Meds:  acetaminophen   Tablet .. 650 milliGRAM(s) Oral every 6 hours PRN  ALBUTerol    90 MICROgram(s) HFA Inhaler 2 Puff(s) Inhalation every 6 hours PRN  aspirin enteric coated 81 milliGRAM(s) Oral daily  atorvastatin 80 milliGRAM(s) Oral at bedtime  benzonatate 200 milliGRAM(s) Oral every 8 hours  clopidogrel Tablet 75 milliGRAM(s) Oral daily  dextrose 40% Gel 15 Gram(s) Oral once  dextrose 5%. 1000 milliLiter(s) IV Continuous <Continuous>  dextrose 5%. 1000 milliLiter(s) IV Continuous <Continuous>  dextrose 50% Injectable 25 Gram(s) IV Push once  dextrose 50% Injectable 12.5 Gram(s) IV Push once  dextrose 50% Injectable 25 Gram(s) IV Push once  glucagon  Injectable 1 milliGRAM(s) IntraMuscular once  insulin detemir injectable (LEVEMIR) 35 Unit(s) SubCutaneous every 12 hours  insulin lispro (ADMELOG) corrective regimen sliding scale   SubCutaneous at bedtime  insulin lispro (ADMELOG) corrective regimen sliding scale   SubCutaneous three times a day before meals  insulin lispro Injectable (ADMELOG) 18 Unit(s) SubCutaneous three times a day before meals  lisinopril 10 milliGRAM(s) Oral daily  metoprolol succinate  milliGRAM(s) Oral daily  oxybutynin 5 milliGRAM(s) Oral every 12 hours  sodium chloride 0.9% lock flush 3 milliLiter(s) IV Push every 8 hours  sodium chloride 0.9%. 1000 milliLiter(s) IV Continuous <Continuous>      Vitals:  T(F): 98.4 (09-24), Max: 98.4 (09-24)  HR: 89 (09-24) (66 - 90)  BP: 126/64 (09-24) (110/60 - 126/64)  RR: 18 (09-24)  SpO2: 96% (09-24)  I&O's Summary    23 Sep 2021 07:01  -  24 Sep 2021 07:00  --------------------------------------------------------  IN: 420 mL / OUT: 500 mL / NET: -80 mL        Physical Exam:  Appearance: No acute distress; well appearing  Eyes: EOMI, no scleral icterus   HEENT: Normal oral mucosa  Cardiovascular: RRR, S1, S2, no murmurs, rubs, or gallops; no edema; no JVD  Respiratory: Clear to auscultation bilaterally, no auditory stridor   Gastrointestinal: soft, non-tender, non-distended with normal bowel sounds  Musculoskeletal: No clubbing; no joint deformity   Neurologic: Non-focal  Psychiatry: AAOx3, mood & affect appropriate  Skin: No rashes, ecchymoses, or cyanosis                          13.3   7.80  )-----------( 255      ( 24 Sep 2021 07:53 )             40.1     09-24    140  |  102  |  31<H>  ----------------------------<  127<H>  4.1   |  21<L>  |  1.33<H>    Ca    10.2      24 Sep 2021 07:53  Phos  4.1     09-23  Mg     2.20     09-24        CARDIAC MARKERS ( 21 Sep 2021 08:34 )  933 ng/L / x     / x     / 494 U/L / x     / 20.7 ng/mL  CARDIAC MARKERS ( 21 Sep 2021 00:57 )  305 ng/L / x     / x     / TNP U/L / x     / 24.0 ng/mL  CARDIAC MARKERS ( 20 Sep 2021 17:38 )  x     / x     / x     / x     / x     / 11.1 ng/mL  CARDIAC MARKERS ( 20 Sep 2021 17:34 )  82 ng/L / x     / x     / x     / x     / x      CARDIAC MARKERS ( 20 Sep 2021 15:36 )  54 ng/L / x     / x     / x     / x     / x          Serum Pro-Brain Natriuretic Peptide: 72 pg/mL (09-20 @ 17:38)

## 2021-09-24 NOTE — CHART NOTE - NSCHARTNOTEFT_GEN_A_CORE
Cardiac MRI revealed, "Normal left ventricular size and systolic function. LVEF 60%. Delayed enhancement of the mid anterolateral and lateral walls indicative of a focal area of fibrosis, nonspecific. T2-weighted imaging demonstrates high signal intensity of the septum and anteroseptal walls suggestive of myocardial edema. Collectively these findings may represent myocarditis with associated edema in the septum without infarct, and focal area of myocarditis-related fibrosis in the lateral wall."    Results reviewed with cardiology fellow Dr. JORDAN ([id number]) recommend to trend trop, CK and check proBNP, no further intervention as Pt is without chest discomfort and trop/CK trending down from prior at this time, if continues to trend down, may consider discharge planning over the weekend. Dr. Mcgill made aware and agrees to plan.
Cardiac MRI concerning for myocarditis. Patient currently asymptomatic and cardiac function is normal. If repeat troponin/CK/BNP are stable or downtrending, patient can then follow up as outpatient with no further interventions or evaluations at this time from cardiac standpoint.     Thad Coles MD  Cardiology Fellow - PGY 4  For all New Consults and Questions:  [email or web address]   Login: [employer]
Status post Cath, Right Radial site without bleeding or hematoma.  Dressing is intact.  Positive Pulses, Capillary refill less than two seconds.  Will continue to monitor.                                                                                                                                                  BHAVYA Yost, RPA-C [id number]

## 2021-09-25 ENCOUNTER — TRANSCRIPTION ENCOUNTER (OUTPATIENT)
Age: 66
End: 2021-09-25

## 2021-09-25 VITALS
DIASTOLIC BLOOD PRESSURE: 60 MMHG | RESPIRATION RATE: 17 BRPM | HEART RATE: 83 BPM | OXYGEN SATURATION: 98 % | TEMPERATURE: 99 F | SYSTOLIC BLOOD PRESSURE: 127 MMHG

## 2021-09-25 LAB
ANION GAP SERPL CALC-SCNC: 15 MMOL/L — HIGH (ref 7–14)
BUN SERPL-MCNC: 32 MG/DL — HIGH (ref 7–23)
CALCIUM SERPL-MCNC: 9.5 MG/DL — SIGNIFICANT CHANGE UP (ref 8.4–10.5)
CHLORIDE SERPL-SCNC: 104 MMOL/L — SIGNIFICANT CHANGE UP (ref 98–107)
CK MB BLD-MCNC: 0.9 % — SIGNIFICANT CHANGE UP (ref 0–2.5)
CK MB CFR SERPL CALC: 1.7 NG/ML — SIGNIFICANT CHANGE UP
CK SERPL-CCNC: 180 U/L — HIGH (ref 25–170)
CO2 SERPL-SCNC: 24 MMOL/L — SIGNIFICANT CHANGE UP (ref 22–31)
CREAT SERPL-MCNC: 1.39 MG/DL — HIGH (ref 0.5–1.3)
GLUCOSE SERPL-MCNC: 183 MG/DL — HIGH (ref 70–99)
HCT VFR BLD CALC: 39.3 % — SIGNIFICANT CHANGE UP (ref 34.5–45)
HGB BLD-MCNC: 13 G/DL — SIGNIFICANT CHANGE UP (ref 11.5–15.5)
MAGNESIUM SERPL-MCNC: 2.3 MG/DL — SIGNIFICANT CHANGE UP (ref 1.6–2.6)
MCHC RBC-ENTMCNC: 30.4 PG — SIGNIFICANT CHANGE UP (ref 27–34)
MCHC RBC-ENTMCNC: 33.1 GM/DL — SIGNIFICANT CHANGE UP (ref 32–36)
MCV RBC AUTO: 91.8 FL — SIGNIFICANT CHANGE UP (ref 80–100)
NRBC # BLD: 0 /100 WBCS — SIGNIFICANT CHANGE UP
NRBC # FLD: 0 K/UL — SIGNIFICANT CHANGE UP
PHOSPHATE SERPL-MCNC: 3.9 MG/DL — SIGNIFICANT CHANGE UP (ref 2.5–4.5)
PLATELET # BLD AUTO: 235 K/UL — SIGNIFICANT CHANGE UP (ref 150–400)
POTASSIUM SERPL-MCNC: 4.1 MMOL/L — SIGNIFICANT CHANGE UP (ref 3.5–5.3)
POTASSIUM SERPL-SCNC: 4.1 MMOL/L — SIGNIFICANT CHANGE UP (ref 3.5–5.3)
RBC # BLD: 4.28 M/UL — SIGNIFICANT CHANGE UP (ref 3.8–5.2)
RBC # FLD: 14.2 % — SIGNIFICANT CHANGE UP (ref 10.3–14.5)
SODIUM SERPL-SCNC: 143 MMOL/L — SIGNIFICANT CHANGE UP (ref 135–145)
TROPONIN T, HIGH SENSITIVITY RESULT: 327 NG/L — CRITICAL HIGH
WBC # BLD: 6.05 K/UL — SIGNIFICANT CHANGE UP (ref 3.8–10.5)
WBC # FLD AUTO: 6.05 K/UL — SIGNIFICANT CHANGE UP (ref 3.8–10.5)

## 2021-09-25 PROCEDURE — 99239 HOSP IP/OBS DSCHRG MGMT >30: CPT

## 2021-09-25 RX ORDER — CLOPIDOGREL BISULFATE 75 MG/1
1 TABLET, FILM COATED ORAL
Qty: 0 | Refills: 0 | DISCHARGE
Start: 2021-09-25

## 2021-09-25 RX ORDER — ASPIRIN/CALCIUM CARB/MAGNESIUM 324 MG
1 TABLET ORAL
Qty: 0 | Refills: 0 | DISCHARGE

## 2021-09-25 RX ORDER — INDAPAMIDE 1.25 MG
1 TABLET ORAL
Qty: 0 | Refills: 0 | DISCHARGE

## 2021-09-25 RX ORDER — ASPIRIN/CALCIUM CARB/MAGNESIUM 324 MG
1 TABLET ORAL
Qty: 0 | Refills: 0 | DISCHARGE
Start: 2021-09-25

## 2021-09-25 RX ADMIN — Medication 35 UNIT(S): at 06:01

## 2021-09-25 RX ADMIN — Medication 200 MILLIGRAM(S): at 14:55

## 2021-09-25 RX ADMIN — Medication 18 UNIT(S): at 09:15

## 2021-09-25 RX ADMIN — SODIUM CHLORIDE 3 MILLILITER(S): 9 INJECTION INTRAMUSCULAR; INTRAVENOUS; SUBCUTANEOUS at 14:27

## 2021-09-25 RX ADMIN — SODIUM CHLORIDE 3 MILLILITER(S): 9 INJECTION INTRAMUSCULAR; INTRAVENOUS; SUBCUTANEOUS at 05:52

## 2021-09-25 RX ADMIN — Medication 2: at 09:15

## 2021-09-25 RX ADMIN — Medication 150 MILLIGRAM(S): at 06:02

## 2021-09-25 RX ADMIN — LISINOPRIL 10 MILLIGRAM(S): 2.5 TABLET ORAL at 06:01

## 2021-09-25 RX ADMIN — Medication 18 UNIT(S): at 13:27

## 2021-09-25 RX ADMIN — CLOPIDOGREL BISULFATE 75 MILLIGRAM(S): 75 TABLET, FILM COATED ORAL at 13:27

## 2021-09-25 RX ADMIN — Medication 200 MILLIGRAM(S): at 06:01

## 2021-09-25 RX ADMIN — Medication 81 MILLIGRAM(S): at 13:27

## 2021-09-25 RX ADMIN — Medication 650 MILLIGRAM(S): at 01:26

## 2021-09-25 RX ADMIN — Medication 650 MILLIGRAM(S): at 00:26

## 2021-09-25 RX ADMIN — Medication 5 MILLIGRAM(S): at 06:01

## 2021-09-25 NOTE — DISCHARGE NOTE PROVIDER - CARE PROVIDER_API CALL
Odalis Grajeda  [facility]  [address] [place] [address]  Phone: [phone]  Fax: [phone]  Follow Up Time: 1 week   Odalis Grajeda  [facility]  [address] [place] [address]  Phone: [phone]  Fax: [phone]  Follow Up Time: 1 week    Terence Adan  ENDOCRINOLOGY/METAB/DIABETES  [facility] [place] [address]  Phone: [phone]  Fax: [phone]  Follow Up Time:

## 2021-09-25 NOTE — DISCHARGE NOTE NURSING/CASE MANAGEMENT/SOCIAL WORK - PATIENT PORTAL LINK FT
You can access the FollowMyHealth Patient Portal offered by [facility] by registering at the following website: [email or web address]. By joining [employer]’s FollowMyHealth portal, you will also be able to view your health information using other applications (apps) compatible with our system.

## 2021-09-25 NOTE — PROGRESS NOTE ADULT - PROBLEM SELECTOR PROBLEM 2
Type 2 diabetes mellitus with hyperglycemia, with long-term current use of insulin
Type 2 diabetes mellitus with hyperglycemia, with long-term current use of insulin
Lower extremity edema

## 2021-09-25 NOTE — DISCHARGE NOTE PROVIDER - NSDCCPCAREPLAN_GEN_ALL_CORE_FT
PRINCIPAL DISCHARGE DIAGNOSIS  Diagnosis: Non-ST elevation MI (NSTEMI)  Assessment and Plan of Treatment: Please continue your statin medication to control cholesterol levels, as well as, Aspirin 81mg, PLAVIX 75 mg  as prescribed in order to optimize your heart health.   Follow-up with your primary provider/cardiologist as outpatient for ongoing care. If you have persistent chest pain, shortness of breath, heart palpitations, or dizziness you should return to the emergency room.        SECONDARY DISCHARGE DIAGNOSES  Diagnosis: ALLEY (obstructive sleep apnea)  Assessment and Plan of Treatment: Pt continue to use on cpap while sleeping at night.    Diagnosis: Type 2 diabetes mellitus with hyperglycemia, with long-term current use of insulin  Assessment and Plan of Treatment: Your Hemoglobin A1C is 10.8. Target goal for hemoglobin A1C is <7. Monitor blood glucose levels throughout the day before meals and at bedtime. Record blood sugars and bring to outpatient providers appointment in order to be reviewed by your doctor for management modifications. If your sugars are more than 400 or less than 70 you should contact your PCP immediately. Monitor for signs/symptoms of low blood glucose, such as, dizziness, altered mental status, or cool/clammy skin. In addition, monitor for signs/symptoms of high blood glucose, such as, feeling hot, dry, fatigued, or with increased thirst/urination. Make regular podiatry appointments in order to have feet checked for wounds and uncontrolled toe nail growth to prevent infections, as well as, appointments with an ophthalmologist to monitor your vision.    Diagnosis: Hypertension  Assessment and Plan of Treatment: Continue blood pressure medication regimen as directed. Monitor for any visual changes, headaches or dizziness.  Monitor blood pressure regularly.  Follow up with your primary care provider for further management for high blood pressure.     PRINCIPAL DISCHARGE DIAGNOSIS  Diagnosis: Non-ST elevation MI (NSTEMI)  Assessment and Plan of Treatment: Please continue your statin medication to control cholesterol levels, as well as, Aspirin 81mg, as prescribed in order to optimize your heart health.   Follow-up with your primary provider/cardiologist as outpatient for ongoing care. If you have persistent chest pain, shortness of breath, heart palpitations, or dizziness you should return to the emergency room.        SECONDARY DISCHARGE DIAGNOSES  Diagnosis: Bacterial heart infection  Assessment and Plan of Treatment: A Cardia MRI had concerns for heart muscle infections, you will need to take antibiotic tablets for 3 days following discharge.    Diagnosis: ALLEY (obstructive sleep apnea)  Assessment and Plan of Treatment: Pt continue to use on cpap while sleeping at night.    Diagnosis: Type 2 diabetes mellitus with hyperglycemia, with long-term current use of insulin  Assessment and Plan of Treatment: Your Hemoglobin A1C is 10.8. Target goal for hemoglobin A1C is <7. Monitor blood glucose levels throughout the day before meals and at bedtime. Record blood sugars and bring to outpatient providers appointment in order to be reviewed by your doctor for management modifications. If your sugars are more than 400 or less than 70 you should contact your PCP immediately. Monitor for signs/symptoms of low blood glucose, such as, dizziness, altered mental status, or cool/clammy skin. In addition, monitor for signs/symptoms of high blood glucose, such as, feeling hot, dry, fatigued, or with increased thirst/urination. Make regular podiatry appointments in order to have feet checked for wounds and uncontrolled toe nail growth to prevent infections, as well as, appointments with an ophthalmologist to monitor your vision.    Diagnosis: Hypertension  Assessment and Plan of Treatment: Continue blood pressure medication regimen as directed. Monitor for any visual changes, headaches or dizziness.  Monitor blood pressure regularly.  Follow up with your primary care provider for further management for high blood pressure.     PRINCIPAL DISCHARGE DIAGNOSIS  Diagnosis: Myocarditis, acute  Assessment and Plan of Treatment: You were Diagnosed with Myocarditis.  you had a Cardiac Cath which showed no significant Blockages.   Please Follow up with cardiology Clinic in 1-2 weeks  Call for an appointment      SECONDARY DISCHARGE DIAGNOSES  Diagnosis: Hypertension  Assessment and Plan of Treatment: Continue blood pressure medication regimen as directed. Monitor for any visual changes, headaches or dizziness.  Monitor blood pressure regularly.  Follow up with your primary care provider for further management for high blood pressure.    Diagnosis: ALLEY (obstructive sleep apnea)  Assessment and Plan of Treatment: Pt continue to use on cpap while sleeping at night.    Diagnosis: Type 2 diabetes mellitus with hyperglycemia, with long-term current use of insulin  Assessment and Plan of Treatment: Your Hemoglobin A1C is 10.8. Target goal for hemoglobin A1C is <7. Monitor blood glucose levels throughout the day before meals and at bedtime. Record blood sugars and bring to outpatient providers appointment in order to be reviewed by your doctor for management modifications. If your sugars are more than 400 or less than 70 you should contact your PCP immediately. Monitor for signs/symptoms of low blood glucose, such as, dizziness, altered mental status, or cool/clammy skin. In addition, monitor for signs/symptoms of high blood glucose, such as, feeling hot, dry, fatigued, or with increased thirst/urination. Make regular podiatry appointments in order to have feet checked for wounds and uncontrolled toe nail growth to prevent infections, as well as, appointments with an ophthalmologist to monitor your vision.    Diagnosis: Acute UTI  Assessment and Plan of Treatment: You Were diagnosed with a UTI, Please complete  Your antibiotics as ordered  Always wipe from front to back after using the bathroom. Never wipe twice with the same tissue. Take showers and avoid prolonged baths. Try to empty the bladder at least every 4 hours during the day while awake, even if the need or urge to void is absent.  Do not try to hold your urine until a more convenient time or place.  Drink plenty of water.  Follow up with Your PMD in 1-2 weeks

## 2021-09-25 NOTE — PROGRESS NOTE ADULT - PROBLEM SELECTOR PLAN 4
Scr 1.33 today, does not meet criteria for OBI  -continue to monitor  -encourage PO
Scr 1.33 today, does not meet criteria for OBI  -continue to monitor  -encourage PO
Noted increase in creatinine level today. Not meeting OBI criteria as yet. Likely in the setting of recent NPO status and pt on lasix.     -continue to monitor. Pending repeat BMP   -encourage PO
-Monitor and replete as needed
Noted increase in creatinine level today. Not meeting OBI criteria as yet. Likely in the setting of recent NPO status and pt on lasix.     -continue to monitor   -encourage PO

## 2021-09-25 NOTE — PROGRESS NOTE ADULT - PROBLEM SELECTOR PROBLEM 9
ALLEY (obstructive sleep apnea)
Need for prophylactic measure

## 2021-09-25 NOTE — PROGRESS NOTE ADULT - PROBLEM SELECTOR PLAN 8
-Resume ACE inhibitor and cont metoprolol as stated above   -Hold indapamide
-Resume ACE inhibitor and cont metoprolol as stated above   -Hold indapamide
-CPAP QHS
-Resume home ramipril and cont metoprolol as as stated above   -Hold indapamide
-Resume ACE inhibitor and cont metoprolol as stated above   -Hold indapamide

## 2021-09-25 NOTE — PROGRESS NOTE ADULT - PROBLEM SELECTOR PROBLEM 1
Hypertension
Elevated troponin
Hypertension
NSTEMI (non-ST elevation myocardial infarction)
Elevated troponin
Elevated troponin
NSTEMI (non-ST elevation myocardial infarction)

## 2021-09-25 NOTE — PROGRESS NOTE ADULT - PROBLEM SELECTOR PLAN 5
-Monitor and replete as needed
UA showed: moderate bacteria, small leuk, neg nitrite. Patient is asymptomatic    -Will hold off abx for now given asymptomatic bacteruria and urine cx results would not  unless pt having symptoms.

## 2021-09-25 NOTE — PROGRESS NOTE ADULT - PROBLEM SELECTOR PROBLEM 10
Need for prophylactic measure
Need for prophylactic measure
Discharge planning issues
Need for prophylactic measure
Need for prophylactic measure

## 2021-09-25 NOTE — DISCHARGE NOTE PROVIDER - PROVIDER TOKENS
PROVIDER:[TOKEN:[[id number]],FOLLOWUP:[1 week]] PROVIDER:[TOKEN:[[id number]],FOLLOWUP:[1 week]],PROVIDER:[TOKEN:[[id number]]]

## 2021-09-25 NOTE — DISCHARGE NOTE PROVIDER - NSDCMRMEDTOKEN_GEN_ALL_CORE_FT
Aspirin Enteric Coated 81 mg oral delayed release tablet: 1 tab(s) orally once a day  Crestor 40 mg oral tablet: 1 tab(s) orally once a day  indapamide 1.25 mg oral tablet: 1 tab(s) orally once a day (in the morning)  Lasix 20 mg oral tablet: 1 tab(s) orally once a day  Levemir 100 units/mL subcutaneous solution: 50 unit(s) subcutaneous 2 times a day  metoprolol succinate 100 mg oral tablet, extended release: 1.5 tab(s) orally once a day  NovoLOG FlexPen 100 units/mL injectable solution: 30 unit(s) injectable 3 times a day (before meals)  oxybutynin 10 mg/24 hr oral tablet, extended release: 1 tab(s) orally once a day  ramipril 10 mg oral capsule: 1 cap(s) orally once a day   aspirin 81 mg oral delayed release tablet: 1 tab(s) orally once a day  Crestor 40 mg oral tablet: 1 tab(s) orally once a day  indapamide 1.25 mg oral tablet: 1 tab(s) orally once a day (in the morning)  Lasix 20 mg oral tablet: 1 tab(s) orally once a day  Levemir 100 units/mL subcutaneous solution: 50 unit(s) subcutaneous 2 times a day  metoprolol succinate 100 mg oral tablet, extended release: 1.5 tab(s) orally once a day  NovoLOG FlexPen 100 units/mL injectable solution: 30 unit(s) injectable 3 times a day (before meals)  oxybutynin 10 mg/24 hr oral tablet, extended release: 1 tab(s) orally once a day  ramipril 10 mg oral capsule: 1 cap(s) orally once a day  sulfamethoxazole-trimethoprim 800 mg-160 mg oral tablet: 1 tab(s) orally 2 times a day  sulfamethoxazole-trimethoprim 800 mg-160 mg oral tablet: 1 tab(s) orally 2 times a day   aspirin 81 mg oral delayed release tablet: 1 tab(s) orally once a day  Crestor 40 mg oral tablet: 1 tab(s) orally once a day  indapamide 1.25 mg oral tablet: 1 tab(s) orally once a day (in the morning)  Lasix 20 mg oral tablet: 1 tab(s) orally once a day  Levemir 100 units/mL subcutaneous solution: 50 unit(s) subcutaneous 2 times a day  metoprolol succinate 100 mg oral tablet, extended release: 1.5 tab(s) orally once a day  NovoLOG FlexPen 100 units/mL injectable solution: 30 unit(s) injectable 3 times a day (before meals)  oxybutynin 10 mg/24 hr oral tablet, extended release: 1 tab(s) orally once a day  ramipril 10 mg oral capsule: 1 cap(s) orally once a day  sulfamethoxazole-trimethoprim 800 mg-160 mg oral tablet: 1 tab(s) orally 2 times a day   aspirin 81 mg oral delayed release tablet: 1 tab(s) orally once a day  Crestor 40 mg oral tablet: 1 tab(s) orally once a day  Lasix 20 mg oral tablet: 1 tab(s) orally once a day  Levemir 100 units/mL subcutaneous solution: 50 unit(s) subcutaneous 2 times a day  metoprolol succinate 100 mg oral tablet, extended release: 1.5 tab(s) orally once a day  NovoLOG FlexPen 100 units/mL injectable solution: 30 unit(s) injectable 3 times a day (before meals)  oxybutynin 10 mg/24 hr oral tablet, extended release: 1 tab(s) orally once a day  ramipril 10 mg oral capsule: 1 cap(s) orally once a day  sulfamethoxazole-trimethoprim 800 mg-160 mg oral tablet: 1 tab(s) orally 2 times a day

## 2021-09-25 NOTE — PROGRESS NOTE ADULT - SUBJECTIVE AND OBJECTIVE BOX
Patient is a 66y old  Female who presents with a chief complaint of chest pain (24 Sep 2021 12:34)      SUBJECTIVE / OVERNIGHT EVENTS: No events overnight. pt denies cp, sob, palpitations, fever, chills. Has no acute medical complaints.    MEDICATIONS  (STANDING):  aspirin enteric coated 81 milliGRAM(s) Oral daily  atorvastatin 80 milliGRAM(s) Oral at bedtime  benzonatate 200 milliGRAM(s) Oral every 8 hours  clopidogrel Tablet 75 milliGRAM(s) Oral daily  dextrose 40% Gel 15 Gram(s) Oral once  dextrose 5%. 1000 milliLiter(s) (50 mL/Hr) IV Continuous <Continuous>  dextrose 5%. 1000 milliLiter(s) (100 mL/Hr) IV Continuous <Continuous>  dextrose 50% Injectable 25 Gram(s) IV Push once  dextrose 50% Injectable 12.5 Gram(s) IV Push once  dextrose 50% Injectable 25 Gram(s) IV Push once  glucagon  Injectable 1 milliGRAM(s) IntraMuscular once  insulin detemir injectable (LEVEMIR) 35 Unit(s) SubCutaneous every 12 hours  insulin lispro (ADMELOG) corrective regimen sliding scale   SubCutaneous at bedtime  insulin lispro (ADMELOG) corrective regimen sliding scale   SubCutaneous three times a day before meals  insulin lispro Injectable (ADMELOG) 18 Unit(s) SubCutaneous three times a day before meals  lisinopril 10 milliGRAM(s) Oral daily  metoprolol succinate  milliGRAM(s) Oral daily  oxybutynin 5 milliGRAM(s) Oral every 12 hours  sodium chloride 0.9% lock flush 3 milliLiter(s) IV Push every 8 hours  sodium chloride 0.9%. 1000 milliLiter(s) (100 mL/Hr) IV Continuous <Continuous>  trimethoprim  160 mG/sulfamethoxazole 800 mG 1 Tablet(s) Oral two times a day    MEDICATIONS  (PRN):  acetaminophen   Tablet .. 650 milliGRAM(s) Oral every 6 hours PRN Temp greater or equal to 38C (100.4F), Mild Pain (1 - 3), Moderate Pain (4 - 6)  ALBUTerol    90 MICROgram(s) HFA Inhaler 2 Puff(s) Inhalation every 6 hours PRN Shortness of Breath and/or Wheezing  aluminum hydroxide/magnesium hydroxide/simethicone Suspension 30 milliLiter(s) Oral every 4 hours PRN Dyspepsia        CAPILLARY BLOOD GLUCOSE      POCT Blood Glucose.: 187 mg/dL (25 Sep 2021 09:01)  POCT Blood Glucose.: 166 mg/dL (25 Sep 2021 08:56)  POCT Blood Glucose.: 160 mg/dL (25 Sep 2021 05:57)  POCT Blood Glucose.: 174 mg/dL (24 Sep 2021 21:46)  POCT Blood Glucose.: 245 mg/dL (24 Sep 2021 17:56)  POCT Blood Glucose.: 116 mg/dL (24 Sep 2021 12:32)    I&O's Summary    24 Sep 2021 07:01  -  25 Sep 2021 07:00  --------------------------------------------------------  IN: 340 mL / OUT: 400 mL / NET: -60 mL    Vital Signs Last 24 Hrs  T(C): 36.8 (25 Sep 2021 06:00), Max: 36.8 (24 Sep 2021 12:55)  T(F): 98.3 (25 Sep 2021 06:00), Max: 98.3 (25 Sep 2021 06:00)  HR: 86 (25 Sep 2021 06:32) (85 - 90)  BP: 124/60 (25 Sep 2021 06:00) (124/60 - 131/66)  BP(mean): --  RR: 18 (25 Sep 2021 06:00) (18 - 18)  SpO2: 98% (25 Sep 2021 06:00) (98% - 98%)    PHYSICAL EXAM:    GENERAL: NAD, well-developed  HEAD:  Atraumatic, Normocephalic  CHEST/LUNG: Clear to auscultation bilaterally; No wheeze  HEART: Regular rate and rhythm; No murmurs, rubs, or gallops  ABDOMEN: Soft, Nontender, Nondistended; Bowel sounds present  EXTREMITIES:  2+ Peripheral Pulses, No clubbing, cyanosis, or edema  PSYCH: AAOx3  NEUROLOGY: non-focal    LABS:                        13.0   6.05  )-----------( 235      ( 25 Sep 2021 08:11 )             39.3         143  |  104  |  32<H>  ----------------------------<  183<H>  4.1   |  24  |  1.39<H>    Ca    9.5      25 Sep 2021 08:11  Phos  3.9     09-  Mg     2.30     09-25        CARDIAC MARKERS ( 25 Sep 2021 08:11 )  x     / x     / 180 U/L / x     / 1.7 ng/mL  CARDIAC MARKERS ( 24 Sep 2021 17:38 )  x     / x     / 199 U/L / x     / 2.0 ng/mL      Urinalysis Basic - ( 24 Sep 2021 19:39 )    Color: Yellow / Appearance: Clear / S.025 / pH: x  Gluc: x / Ketone: Negative  / Bili: Negative / Urobili: <2 mg/dL   Blood: x / Protein: Trace / Nitrite: Positive   Leuk Esterase: Moderate / RBC: 0 /HPF / WBC 35 /HPF   Sq Epi: x / Non Sq Epi: 1 /HPF / Bacteria: Many        RADIOLOGY & ADDITIONAL TESTS:    Imaging Personally Reviewed:    Consultant(s) Notes Reviewed:      Care Discussed with Consultants/Other Providers: Cardiology

## 2021-09-25 NOTE — PROGRESS NOTE ADULT - PROBLEM SELECTOR PLAN 2
Patient noted with worsening LE edema and elevated D-Dimer Pro BNP: 72 likely falsely low due to body habitus     -LE dopplers neg for DVT. CTA neg for PE   -c/w lasix 20mg qd
Patient noted with worsening LE edema and elevated D-Dimer Pro BNP: 72 likely falsely low due to body habitus     -Pending b/l LE dopplers r/o DVT. CTA neg for PE   -Pending TTE   -c/w lasix 20mg qd
Patient noted with worsening LE edema and elevated D-Dimer Pro BNP: 72 likely falsely low due to body habitus     -LE dopplers neg for DVT. CTA neg for PE   -c/w lasix 20mg qd
Patient noted with worsening LE edema and elevated D-Dimer Pro BNP: 72 likely fasely low due to body habitus     -Pending b/l LE dopplers r/o DVT. CTA neg for PE   -Pending TTE   -c/w lasix 20mg qd
Patient noted with worsening LE edema and elevated D-Dimer Pro BNP: 72 likely falsely low due to body habitus     -Pending b/l LE dopplers r/o DVT. CTA neg for PE   -Pending TTE   -c/w lasix 20mg qd

## 2021-09-25 NOTE — PROGRESS NOTE ADULT - PROBLEM SELECTOR PLAN 3
Improved on Repeat EKG   -Monitor on Tele  -Avoid QT prolonging agents
-QTc appx 500  -Monitor on Tele  -f/u Repeat EKG   -Avoid QT prolonging agents
Improved on Repeat EKG   -Monitor on Tele  -Avoid QT prolonging agents

## 2021-09-25 NOTE — PROGRESS NOTE ADULT - PROBLEM SELECTOR PROBLEM 4
Elevated serum creatinine
Elevated serum creatinine
Hypokalemia
Elevated serum creatinine
Elevated serum creatinine

## 2021-09-25 NOTE — PROGRESS NOTE ADULT - PROBLEM SELECTOR PLAN 6
A1c 10.8. Pt reports adherence to insulin regimen at home but states regimen was decreased 2/2 hypoglycemia     -monitor fingersticks  -C/w Levemir as 35 units BID and Novolog as 18 units TID qac. Will monitor FS and titrate regimen as needed   -Endocrine consulted and recs appreciated.
A1c 10.8. Pt reports adherance to insulin regimen at home but states regimen was decreased 2/2 hypoglycemia     -monitor fingersticks  -FS currently not controlled. On Levemir as 35 units BID and Novolog as 15 units TID qac. Will increase Levimir to 40U BID. Will hold off on increasing premeal insulin for now given she is currently NPO.   -Endocrine consulted given her poorly controlled DM which is likely contributing to her cardiac ailment   -DM education
A1c 10.8. Pt reports adherence to insulin regimen at home but states regimen was decreased 2/2 hypoglycemia     -monitor fingersticks  -C/w Levemir as 35 units BID and Novolog as 18 units TID qac. Will monitor FS and titrate regimen as needed   -Endocrine consulted and recs appreciated.
A1c 10.8. Pt reports adherence to insulin regimen at home but states regimen was decreased 2/2 hypoglycemia     -monitor fingersticks  -C/w Levemir as 35 units BID and Novolog as 18 units TID qac. Will monitor FS and titrate regimen as needed   -Endocrine consulted and recs appreciated.
A1c 10.8. Pt reports adherence to insulin regimen at home but states regimen was decreased 2/2 hypoglycemia     -monitor fingersticks  -C/w Levemir as 35 units BID and Novolog as 18 units TID qac. Will monitor FS and titrate regimen as needed   -Endocrine consulted and recs appreciated.   -DM education

## 2021-09-25 NOTE — PROGRESS NOTE ADULT - PROBLEM SELECTOR PROBLEM 3
HLD (hyperlipidemia)
HLD (hyperlipidemia)
QT prolongation

## 2021-09-25 NOTE — PROGRESS NOTE ADULT - ASSESSMENT
65 y/o Female hx of ALLEY (on CPAP), CKD, HTN, COVID 19 [2020] , Type 2 DM (on insulin), pulmonary HTN a/w CP and elevated Hst, w/ concern for NSTEMI. However, LHC with no significant CAD.

## 2021-09-25 NOTE — PROGRESS NOTE ADULT - PROBLEM SELECTOR PROBLEM 6
Type 2 diabetes mellitus with hyperglycemia, with long-term current use of insulin

## 2021-09-25 NOTE — PROGRESS NOTE ADULT - PROBLEM SELECTOR PLAN 10
DVT ppx -Pt w/ Improve Score of 1 (for age) as she has been ambulating. No need for pharmacological ppx at this time. If not ambulating will start pharmacological ppx. SCDs     Dispo - Discussed w/ cards can dc home given downtrending trops and asymptomatic, will follow outpatient. Time spent 36 mins

## 2021-09-25 NOTE — DISCHARGE NOTE PROVIDER - CARE PROVIDERS DIRECT ADDRESSES
[email or web address]

## 2021-09-25 NOTE — PROGRESS NOTE ADULT - PROBLEM SELECTOR PLAN 7
UA showed: moderate bacteria, small leuk, neg nitrite. Urne cx + E.Coli . Patient is asymptomatic    -Will hold off abx for now given asymptomatic bacteruria and urine cx results would not  unless pt having symptoms.
UA showed: moderate bacteria, small leuk, neg nitrite. Urne cx + E.Coli . Patient is asymptomatic    -Will hold off abx for now given asymptomatic bacteruria and urine cx results would not  unless pt having symptoms.
-Hold ramipril in anticipation for cath  -cont metoprolol as tartrate at 75mg BID for now   -Hold indapamide
UA showed: moderate bacteria, small leuk, neg nitrite. Urne cx + E.Coli . Patient endorsed dysuria yesterday, repeat UA positive for infection  empirically tx w/ bactrim x 3 days  can follow up with PCP in 1-3 weeks to confirm symptomatic resolution
UA showed: moderate bacteria, small leuk, neg nitrite. Patient is asymptomatic    -Will hold off abx for now given asymptomatic bacteruria and urine cx results would not  unless pt having symptoms.

## 2021-09-25 NOTE — PROGRESS NOTE ADULT - PROVIDER SPECIALTY LIST ADULT
Hospitalist
Hospitalist
Cardiology
Cardiology
Hospitalist
Cardiology
Cardiology
Endocrinology
Hospitalist
Endocrinology
Hospitalist

## 2021-09-25 NOTE — DISCHARGE NOTE PROVIDER - NSFOLLOWUPCLINICS_GEN_ALL_ED_FT
[facility]  Cardiology  [address] [place] [id number]  Phone: [phone]  Fax:

## 2021-09-25 NOTE — DISCHARGE NOTE PROVIDER - HOSPITAL COURSE
65 y/o F hx of ALLEY (on CPAP), CKD, HTN, COVID 19 [2020] HTN, type 2 DM on insulin, pulmonary HTN, presented with chest pain  Pt ruled in with NSTEMI, [facility] 9/21: luminal irregularity, ECHO EF of 60-65%, Mitral annular calcification, Minimal mitral regurgitation.  Normal left ventricular internal dimensions and wall thicknesses.  Endocardium not well visualized; grossly normal left ventricular systolic function.  Endocardial visualization enhanced with intravenous injection of echo contrast  (Definity). The right ventricle is not well visualized; grossly normal right ventricular systolic function,     CTA Cx with PE, LE edema US with no Bilat. DVT noted   abnormal urinalysis  - UA showed: moderate bacteria, small leuk, neg nitrite  - asymptomatic  - hold off abx for now  - 9/20 u.cx    DM2  - A1C 10.8  - levemir, pre-meals, sliding scale   - house endo cs    HTN  - hold ramipril in anticipation for cath  - metoprolol  - hold indapamide    ALLEY  - CPAP QHS   67 y/o F hx of ALLEY (on CPAP), CKD, HTN, COVID 19 [2020] HTN, type 2 DM on insulin, pulmonary HTN, presented with chest pain  Pt ruled in with NSTEMI, [facility] 9/21: luminal irregularity, ECHO EF of 60-65%, Mitral annular calcification, Minimal mitral regurgitation.  Normal left ventricular internal dimensions and wall thicknesses.  Endocardium not well visualized; grossly normal left ventricular systolic function.  Endocardial visualization enhanced with intravenous injection of echo contrast  (Definity). The right ventricle is not well visualized; grossly normal right ventricular systolic function, Cardiac MRI +myocarditis and pt was started on bactrim course for 3 days. HTN with better control with increase of metoprolol ER [id number] daily. Continues to utilize CPAP ALLEY QHS.    DM A1C 10.8.  Anticipate patient to be discharged on basal bolus (Levemir 50 BID/Novolog 30 units before meals)  -Would cosnider add GLP-1 agonist for better glycemic control however kidney function is declined. If kidney function improves, this can be considered to be started outpatient  -Patient states she will make an appointment with a podiatrist and ophthalmologist       f/u with endo for insulin upon DC   dc planning: basal bolus. she is on levemir and novolog at home   (levemir 50 BID and novolog 30 before meals)      Patient seen and evaluated. Reviewed discharge medications with patient and attending. All new medications requiring new prescriptions were sent to the pharmacy of patient's choice. Reviewed need for prescription for previous home medications and new prescriptions sent if requested. Medically cleared/stable for discharge as per Dr. Bonilla with appropriate follow up. Patient understands and agrees with plan of care. 67 y/o F hx of ALLEY (on CPAP), CKD, HTN, COVID 19 [2020] HTN, type 2 DM on insulin, pulmonary HTN, presented with chest pain  Pt ruled in with NSTEMI, [facility] 9/21: luminal irregularity, ECHO EF of 60-65%, Mitral annular calcification, Minimal mitral regurgitation.  Normal left ventricular internal dimensions and wall thicknesses.  Endocardium not well visualized; grossly normal left ventricular systolic function.  Endocardial visualization enhanced with intravenous injection of echo contrast  (Definity). The right ventricle is not well visualized; grossly normal right ventricular systolic function, Cardiac MRI +myocarditis and pt was started on bactrim course for 3 days. HTN with better control with increase of metoprolol ER [id number] daily. Continues to utilize CPAP ALLEY QHS.    DM A1C 10.8.  Anticipate patient to be discharged on basal bolus (Levemir 50 BID/Novolog 30 units before meals)  -Would cosnider add GLP-1 agonist for better glycemic control however kidney function is declined. If kidney function improves, this can be considered to be started outpatient  -Patient states she will make an appointment with a podiatrist and ophthalmologist       f/u with endo for insulin upon DC   dc planning: basal bolus. she is on levemir and novolog at home   (levemir 50 BID and novolog 30 before meals)      Patient seen and evaluated. Reviewed discharge medications with patient and attending. All new medications requiring new prescriptions were sent to the pharmacy of patient's choice. Reviewed need for prescription for previous home medications and new prescriptions sent if requested. Medically cleared/stable for discharge as per Dr. Bonilla with appropriate follow up. Patient understands and agrees with plan of care. 67 y/o F hx of ALLEY (on CPAP), CKD, HTN, COVID 19 [2020] HTN, type 2 DM on insulin, pulmonary HTN, presented with chest pain  Pt Noted with Elevated Troponin, [facility] 9/21: luminal irregularity, ECHO EF of 60-65%, Mitral annular calcification, Minimal mitral regurgitation.  Normal left ventricular internal dimensions and wall thicknesses.  Endocardium not well visualized; grossly normal left ventricular systolic function.  Endocardial visualization enhanced with intravenous injection of echo contrast  (Definity). The right ventricle is not well visualized; grossly normal right ventricular systolic function, Cardiac MRI +myocarditis . HTN with better control with increase of metoprolol ER [id number] daily.     DM A1C 10.8. Pt Seen by Endocrinology : Insulin Dose adjusted, Plan for Discharge on Levemir 50 and  Novolog 30 TID    -Would cosnider add GLP-1 agonist for better glycemic control however kidney function is declined. If kidney function improves, this can be considered to be started outpatient  -Patient states she will make an appointment with a podiatrist and ophthalmologist   Follow up with Dr. Terence Alonso in 1-2 weeks    UTI: Pt On Bactrim x 3 days, Can follow up with PMD in 1-2 weeks    ALLEY: Continues to utilize CPAP ALLEY QHS.    Patient seen and evaluated. Reviewed discharge medications with patient and attending. All new medications requiring new prescriptions were sent to the pharmacy of patient's choice. Reviewed need for prescription for previous home medications and new prescriptions sent if requested. Medically cleared/stable for discharge as per Dr. Mcgill with appropriate follow up. Patient understands and agrees with plan of care.

## 2021-09-25 NOTE — PROGRESS NOTE ADULT - PROBLEM SELECTOR PLAN 9
-CPAP QHS
DVT ppx -On heparin gtt    Dispo- Pending further medical optimization.

## 2021-09-25 NOTE — PROGRESS NOTE ADULT - PROBLEM SELECTOR PLAN 1
Pt w/ multiple risk factors including HTN and uncontrolled DM2. Patient with had normal cath in 2018. S/p CTA given D-nxsrd=3945 and findings neg for PE or other acute pathology.    S/p cath on 9/20. Awaiting official report. However, no significant CAD per cards.     -Monitor on tele  -C/w ASA and high intensity statin   -heparin gtt and brilinta now dcd given cath results   -On metoprolol 75mg BID. Will swicth to metoprolol 150mg qd per cards recs  -resume home ramipril 10mg qd   -Cards following -will continue to appreciate recs   -Pending TTE- will expedite for discharge
Pt w/ multiple risk factors including HTN and uncontrolled DM2. Patient with had normal cath in 2018. S/p CTA given D-xeuye=3254 and findings neg for PE or other acute pathology.    S/p cath on 9/20. Awaiting official report. However, no significant CAD per cards. Heparin gtt and brilinta now dcd given cath results     -Monitor on tele  -C/w ASA and Plavix   -c/w high intensity statin   -On metoprolol 75mg BID. Will switch to metoprolol 150mg qd per cards recs  -c/w lisinopril 10mg qd (interchange for her home ramipril 10mg qd)   - Cardiac MRI showed LVEF 60%, septal and anteroseptal wall myocardial edema suggestive of myocarditis.  - discussed w/ cardiology, given downtrending hst patient can get discharged with close outpatient follow up.  - TTE reviewed - showed EF 65%, grossly normal LVSF, RVSF
Pt w/ multiple risk factors including HTN and uncontrolled DM2. Patient with had normal cath in 2018. S/p CTA given D-mrktu=1035 and findings neg for PE or other acute pathology.    S/p cath on 9/20. Awaiting official report. However, no significant CAD per cards. Heparin gtt and brilinta now dcd given cath results     -Monitor on tele  -C/w ASA and Plavix   -c/w high intensity statin   -On metoprolol 75mg BID. Will switch to metoprolol 150mg qd per cards recs  -c/w lisinopril 10mg qd (interchange for her home ramipril 10mg qd)   -Cards following and recs appreciated - Given extent of HSt elevation and unremarkable cath cardiac MRI ordered to r/o other underlying pathology. Of note, pt expressed significant claustrophobia w. MRIs and reports having MRI canceled bc she was not able to tolerate. Will give low dose PO ativan prior to MRI.    -Pending TTE
Pt w/ multiple risk factors including HTN and uncontrolled DM2. Patient with had normal cath in 2018. S/p CTA given D-bmfpv=8452 and findings neg for PE or other acute pathology  -Monitor on tele  -Trend HSt for peak  -C/w ASA, brilinta, and statin   -c/w heparin gtt ACS  -c/w metoprolol 75mg BID  -Cards following and recs appreciated. Plan for cath today. NPO for now   -Pending TTE
Pt w/ multiple risk factors including HTN and uncontrolled DM2. Patient with had normal cath in 2018. S/p CTA given D-uyray=4925 and findings neg for PE or other acute pathology.    S/p cath on 9/20. Awaiting official report. However, no significant CAD per cards. Heparin gtt and brilinta now dcd given cath results     -Monitor on tele  -C/w ASA and Plavix   -c/w high intensity statin   -On metoprolol 75mg BID. Will switch to metoprolol 150mg qd per cards recs  -c/w lisinopril 10mg qd (interchange for her home ramipril 10mg qd)   -Cards following and recs appreciated - Given extent of HSt elevation and unremarkable cath cardiac MRI ordered to r/o other underlying pathology. Of note, pt expressed significant claustrophobia w. MRIs and reports having MRI canceled bc she was not able to tolerate. Will give low dose PO ativan prior to MRI.    - TTE reviewed - showed EF 65%, grossly normal LVSF, RVSF

## 2022-01-14 ENCOUNTER — APPOINTMENT (OUTPATIENT)
Dept: UROLOGY | Facility: CLINIC | Age: 67
End: 2022-01-14
Payer: COMMERCIAL

## 2022-01-14 DIAGNOSIS — N39.3 STRESS INCONTINENCE (FEMALE) (MALE): ICD-10-CM

## 2022-01-14 DIAGNOSIS — N39.0 URINARY TRACT INFECTION, SITE NOT SPECIFIED: ICD-10-CM

## 2022-01-14 DIAGNOSIS — B37.9 CANDIDIASIS, UNSPECIFIED: ICD-10-CM

## 2022-01-14 DIAGNOSIS — N32.81 OVERACTIVE BLADDER: ICD-10-CM

## 2022-01-14 PROCEDURE — 99214 OFFICE O/P EST MOD 30 MIN: CPT

## 2022-01-14 RX ORDER — FLUCONAZOLE 150 MG/1
150 TABLET ORAL DAILY
Qty: 1 | Refills: 0 | Status: ACTIVE | COMMUNITY
Start: 2022-01-14 | End: 1900-01-01

## 2022-01-14 RX ORDER — LABETALOL HYDROCHLORIDE 300 MG/1
300 TABLET, FILM COATED ORAL
Qty: 180 | Refills: 0 | Status: COMPLETED | COMMUNITY
Start: 2018-03-08 | End: 2022-01-14

## 2022-01-14 RX ORDER — CIPROFLOXACIN HYDROCHLORIDE 500 MG/1
500 TABLET, FILM COATED ORAL
Qty: 10 | Refills: 0 | Status: COMPLETED | COMMUNITY
Start: 2021-06-25 | End: 2022-01-14

## 2022-01-14 RX ORDER — AMLODIPINE BESYLATE 10 MG/1
10 TABLET ORAL
Qty: 30 | Refills: 0 | Status: COMPLETED | COMMUNITY
Start: 2018-03-30 | End: 2022-01-14

## 2022-01-14 RX ORDER — CICLOPIROX OLAMINE 7.7 MG/G
0.77 CREAM TOPICAL
Qty: 90 | Refills: 0 | Status: COMPLETED | COMMUNITY
Start: 2018-04-08 | End: 2022-01-14

## 2022-01-14 RX ORDER — RAMIPRIL 5 MG/1
CAPSULE ORAL
Refills: 0 | Status: ACTIVE | COMMUNITY

## 2022-01-14 RX ORDER — POLYETHYLENE GLYCOL 3350, SODIUM CHLORIDE, POTASSIUM CHLORIDE, SODIUM BICARBONATE, AND SODIUM SULFATE 240; 5.84; 2.98; 6.72; 22.72 G/4L; G/4L; G/4L; G/4L; G/4L
240 POWDER, FOR SOLUTION ORAL
Qty: 4000 | Refills: 0 | Status: COMPLETED | COMMUNITY
Start: 2018-08-01 | End: 2022-01-14

## 2022-01-14 RX ORDER — AMOXICILLIN AND CLAVULANATE POTASSIUM 875; 125 MG/1; MG/1
875-125 TABLET, COATED ORAL TWICE DAILY
Qty: 14 | Refills: 0 | Status: ACTIVE | COMMUNITY
Start: 2022-01-14 | End: 1900-01-01

## 2022-01-14 RX ORDER — LOSARTAN POTASSIUM AND HYDROCHLOROTHIAZIDE 25; 100 MG/1; MG/1
100-25 TABLET ORAL
Qty: 30 | Refills: 0 | Status: COMPLETED | COMMUNITY
Start: 2018-08-05 | End: 2022-01-14

## 2022-01-14 RX ORDER — METOPROLOL SUCCINATE 200 MG/1
200 TABLET, EXTENDED RELEASE ORAL
Refills: 0 | Status: ACTIVE | COMMUNITY

## 2022-01-14 RX ORDER — VALSARTAN AND HYDROCHLOROTHIAZIDE 320; 25 MG/1; MG/1
320-25 TABLET, FILM COATED ORAL
Qty: 30 | Refills: 0 | Status: COMPLETED | COMMUNITY
Start: 2017-11-18 | End: 2022-01-14

## 2022-01-18 LAB
APPEARANCE: CLEAR
BACTERIA: ABNORMAL
BILIRUBIN URINE: NEGATIVE
BLOOD URINE: NEGATIVE
COLOR: NORMAL
GLUCOSE QUALITATIVE U: ABNORMAL
HYALINE CASTS: 0 /LPF
KETONES URINE: NEGATIVE
LEUKOCYTE ESTERASE URINE: ABNORMAL
MICROSCOPIC-UA: NORMAL
NITRITE URINE: NEGATIVE
PH URINE: 6
PROTEIN URINE: ABNORMAL
RED BLOOD CELLS URINE: 3 /HPF
SPECIFIC GRAVITY URINE: 1.03
SQUAMOUS EPITHELIAL CELLS: 1 /HPF
UROBILINOGEN URINE: NORMAL
WHITE BLOOD CELLS URINE: 37 /HPF

## 2022-01-19 LAB — BACTERIA UR CULT: ABNORMAL

## 2022-01-19 RX ORDER — SULFAMETHOXAZOLE AND TRIMETHOPRIM 400; 80 MG/1; MG/1
400-80 TABLET ORAL
Qty: 45 | Refills: 3 | Status: ACTIVE | COMMUNITY
Start: 2022-01-19 | End: 1900-01-01

## 2022-01-19 RX ORDER — NITROFURANTOIN MACROCRYSTALS 50 MG/1
50 CAPSULE ORAL
Qty: 90 | Refills: 3 | Status: COMPLETED | COMMUNITY
Start: 2022-01-14 | End: 2022-01-19

## 2022-07-25 NOTE — ED ADULT NURSE NOTE - CHIEF COMPLAINT QUOTE
pt c/o burning in the middle of the chest beginning 1 hour ago. pt also reports headache and dizziness with burning which subsided. denies sob, fever, n/v. pt in no distress. hx. DM, HTN, CHF, HLD hard copy, drawn during this pregnancy

## 2024-02-11 NOTE — ED PROVIDER NOTE - NS ED ATTENDING STATEMENT MOD
Measurement of opening pressure performed maintaining proper positioning of patient.
I have personally performed a face to face diagnostic evaluation on this patient. I have reviewed the ACP note and agree with the history, exam and plan of care, except as noted.

## 2025-01-06 NOTE — H&P CARDIOLOGY - NS MD HP PULSE RADIAL
Elin called from [facility] to inform the provider that the patient was discharge from the hospital over the weekend an she will like a verbal confirmation on if the provider will sign an follow home health orders. PT/OT/ therapy  Elin  Phone: [phone]   
RTC to  Elin from [facility] regarding patient was discharge from the hospital over the weekend an she will like a verbal confirmation on if the provider will sign an follow home health orders. PT/OT/ therapy. Dr. Garland notified and approval given. No answer and LVM.  Elin  Phone: [phone]   
right normal/left normal

## 2025-04-17 NOTE — LETTER BODY
[FreeTextEntry1] : Odalis Grajeda, DO\par [address]\[place] [address]\par \par Dear Dr. Grajeda,\par \par I had the pleasure of meeting Colleen Warner in the office today.  She is a 65-year-old woman who is here with a primary complaint of urinary incontinence.  The patient complains of urinary frequency and that she is unable to hold her urine when she gets an urge.  She has leakage as a result and has been resorting to the use of pads for protection of her clothing.  She also states that she has some minor leakage associated with coughing and sneezing.  The leakage associated with her urgency however seems to be her primary complaint.  She wakes up once or twice overnight to urinate.  She urinates about every 2 hours during the daytime.  She notes the leakage as described above.  She does not generally need to strain or push to urinate.\par \par The patient has 3 children.  She denies any prior surgical history.  Other medical issues include diabetes, hypertension, sleep apnea, and high cholesterol.\par \par I collected a urine sample today for culture and urinalysis to confirm the absence of any infection at this time.  I am doubtful this will show any infection based on the constellation of symptoms but this will be done to be sure.  Her symptoms are most indicative of overactivity of the bladder with urge associated urinary incontinence.  She also has a less significant stress urinary incontinence component.  I would recommend Kegle exercises for the stress incontinence and a trial of oxybutynin 10 mg tablets to help address the urgency and frequency symptoms.  I will plan to see her back in about a month to reassess when she has tried the new medication.  We did review the proper use of the medication and possible side effects that could be experienced including dry mouth and constipation most commonly.\par \par The patient communicates her understanding and will follow-up for review of her symptoms after use of the medical treatment.  Thank you very much for the kind referral.\par \par Sincerely,\par \par \par \par \par Guerrero Dunn MD, FACS\par  of Urology\par Residency \par [facility]\par \par [facility]\par Director of Robotics and Minimally Invasive Surgery\[address]\[place] [address]\par P: [phone]\par F: [phone]\par [facility] Gastric retention- 2/2 opioid induced constipation   - Abdominal XR (04/12): Ileus versus a partial low-grade small bowel obstruction.   - Abd XR (04/13): The gaseous distention of small bowel loops has decreased. There is increase in gas throughout the colon. No free air or other new radiographic abnormality  -CTM stool count daily  -Improved since 04/11 and believes due to gas.   -CTM daily -CXR 3/30 showing subcutaneous emphysema without pneumomediastinum  -subcutaneous emphysema on RUE and b/l chest wall resolved